# Patient Record
Sex: FEMALE | Race: WHITE | NOT HISPANIC OR LATINO | Employment: PART TIME | URBAN - METROPOLITAN AREA
[De-identification: names, ages, dates, MRNs, and addresses within clinical notes are randomized per-mention and may not be internally consistent; named-entity substitution may affect disease eponyms.]

---

## 2017-08-25 ENCOUNTER — CONVERSION ENCOUNTER (OUTPATIENT)
Dept: GENERAL RADIOLOGY | Facility: HOSPITAL | Age: 67
End: 2017-08-25

## 2018-01-05 ENCOUNTER — OFFICE VISIT CONVERTED (OUTPATIENT)
Dept: CARDIOLOGY | Facility: CLINIC | Age: 68
End: 2018-01-05
Attending: SPECIALIST

## 2018-01-05 ENCOUNTER — OFFICE VISIT CONVERTED (OUTPATIENT)
Dept: DIABETES SERVICES | Facility: HOSPITAL | Age: 68
End: 2018-01-05
Attending: NURSE PRACTITIONER

## 2018-01-05 ENCOUNTER — CONVERSION ENCOUNTER (OUTPATIENT)
Dept: CARDIOLOGY | Facility: CLINIC | Age: 68
End: 2018-01-05

## 2018-01-29 ENCOUNTER — OFFICE VISIT CONVERTED (OUTPATIENT)
Dept: FAMILY MEDICINE CLINIC | Age: 68
End: 2018-01-29
Attending: NURSE PRACTITIONER

## 2018-02-19 ENCOUNTER — OFFICE VISIT CONVERTED (OUTPATIENT)
Dept: DIABETES SERVICES | Facility: HOSPITAL | Age: 68
End: 2018-02-19
Attending: NURSE PRACTITIONER

## 2018-02-27 ENCOUNTER — OFFICE VISIT CONVERTED (OUTPATIENT)
Dept: CARDIOLOGY | Facility: CLINIC | Age: 68
End: 2018-02-27
Attending: SPECIALIST

## 2018-03-13 ENCOUNTER — OFFICE VISIT CONVERTED (OUTPATIENT)
Dept: FAMILY MEDICINE CLINIC | Age: 68
End: 2018-03-13
Attending: NURSE PRACTITIONER

## 2018-05-16 ENCOUNTER — OFFICE VISIT CONVERTED (OUTPATIENT)
Dept: DIABETES SERVICES | Facility: HOSPITAL | Age: 68
End: 2018-05-16
Attending: NURSE PRACTITIONER

## 2018-05-24 ENCOUNTER — OFFICE VISIT CONVERTED (OUTPATIENT)
Dept: FAMILY MEDICINE CLINIC | Age: 68
End: 2018-05-24
Attending: NURSE PRACTITIONER

## 2018-06-25 ENCOUNTER — OFFICE VISIT CONVERTED (OUTPATIENT)
Dept: CARDIOLOGY | Facility: CLINIC | Age: 68
End: 2018-06-25
Attending: SPECIALIST

## 2018-06-25 ENCOUNTER — CONVERSION ENCOUNTER (OUTPATIENT)
Dept: CARDIOLOGY | Facility: CLINIC | Age: 68
End: 2018-06-25

## 2018-08-09 ENCOUNTER — OFFICE VISIT CONVERTED (OUTPATIENT)
Dept: DIABETES SERVICES | Facility: HOSPITAL | Age: 68
End: 2018-08-09
Attending: NURSE PRACTITIONER

## 2018-09-04 ENCOUNTER — OFFICE VISIT CONVERTED (OUTPATIENT)
Dept: DIABETES SERVICES | Facility: HOSPITAL | Age: 68
End: 2018-09-04
Attending: NURSE PRACTITIONER

## 2018-09-19 ENCOUNTER — CONVERSION ENCOUNTER (OUTPATIENT)
Dept: GENERAL RADIOLOGY | Facility: HOSPITAL | Age: 68
End: 2018-09-19

## 2018-09-24 ENCOUNTER — OFFICE VISIT CONVERTED (OUTPATIENT)
Dept: FAMILY MEDICINE CLINIC | Age: 68
End: 2018-09-24
Attending: NURSE PRACTITIONER

## 2018-10-16 ENCOUNTER — OFFICE VISIT CONVERTED (OUTPATIENT)
Dept: DIABETES SERVICES | Facility: HOSPITAL | Age: 68
End: 2018-10-16
Attending: NURSE PRACTITIONER

## 2018-11-16 ENCOUNTER — OFFICE VISIT CONVERTED (OUTPATIENT)
Dept: FAMILY MEDICINE CLINIC | Age: 68
End: 2018-11-16
Attending: NURSE PRACTITIONER

## 2018-12-04 ENCOUNTER — OFFICE VISIT CONVERTED (OUTPATIENT)
Dept: SURGERY | Facility: CLINIC | Age: 68
End: 2018-12-04
Attending: SURGERY

## 2018-12-10 ENCOUNTER — OFFICE VISIT CONVERTED (OUTPATIENT)
Dept: CARDIOLOGY | Facility: CLINIC | Age: 68
End: 2018-12-10
Attending: SPECIALIST

## 2018-12-10 ENCOUNTER — CONVERSION ENCOUNTER (OUTPATIENT)
Dept: CARDIOLOGY | Facility: CLINIC | Age: 68
End: 2018-12-10

## 2019-01-02 LAB
ANION GAP SERPL CALC-SCNC: 16 MMOL/L (ref 8–19)
BUN SERPL-MCNC: 17 MG/DL (ref 5–25)
BUN/CREAT SERPL: 26 {RATIO} (ref 6–20)
CALCIUM SERPL-MCNC: 9.6 MG/DL (ref 8.7–10.4)
CHLORIDE SERPL-SCNC: 100 MMOL/L (ref 99–111)
CONV CO2: 26 MMOL/L (ref 22–32)
CREAT UR-MCNC: 0.65 MG/DL (ref 0.5–0.9)
GFR SERPLBLD BASED ON 1.73 SQ M-ARVRAT: >60 ML/MIN/{1.73_M2}
GLUCOSE SERPL-MCNC: 173 MG/DL (ref 65–99)
OSMOLALITY SERPL CALC.SUM OF ELEC: 292 MOSM/KG (ref 273–304)
POTASSIUM SERPL-SCNC: 4 MMOL/L (ref 3.5–5.3)
SODIUM SERPL-SCNC: 138 MMOL/L (ref 135–147)

## 2019-01-04 ENCOUNTER — HOSPITAL ENCOUNTER (OUTPATIENT)
Dept: PERIOP | Facility: HOSPITAL | Age: 69
Setting detail: HOSPITAL OUTPATIENT SURGERY
Discharge: HOME | End: 2019-01-04
Attending: SURGERY

## 2019-01-04 LAB
GLUCOSE BLD-MCNC: 120 MG/DL (ref 65–99)
GLUCOSE BLD-MCNC: 184 MG/DL (ref 65–99)

## 2019-01-15 ENCOUNTER — OFFICE VISIT CONVERTED (OUTPATIENT)
Dept: UROLOGY | Facility: CLINIC | Age: 69
End: 2019-01-15
Attending: UROLOGY

## 2019-01-15 ENCOUNTER — CONVERSION ENCOUNTER (OUTPATIENT)
Dept: SURGERY | Facility: CLINIC | Age: 69
End: 2019-01-15

## 2019-01-16 ENCOUNTER — OFFICE VISIT CONVERTED (OUTPATIENT)
Dept: DIABETES SERVICES | Facility: HOSPITAL | Age: 69
End: 2019-01-16
Attending: NURSE PRACTITIONER

## 2019-01-16 ENCOUNTER — HOSPITAL ENCOUNTER (OUTPATIENT)
Dept: DIABETES SERVICES | Facility: HOSPITAL | Age: 69
Discharge: HOME | End: 2019-01-16
Attending: NURSE PRACTITIONER

## 2019-01-17 ENCOUNTER — OFFICE VISIT CONVERTED (OUTPATIENT)
Dept: SURGERY | Facility: CLINIC | Age: 69
End: 2019-01-17
Attending: SURGERY

## 2019-01-17 ENCOUNTER — HOSPITAL ENCOUNTER (OUTPATIENT)
Dept: OTHER | Facility: HOSPITAL | Age: 69
Discharge: HOME | End: 2019-01-17
Attending: NURSE PRACTITIONER

## 2019-01-17 LAB
CONV CREATININE URINE, RANDOM: 123.5 MG/DL (ref 10–300)
CONV MICROALBUM.,U,RANDOM: <12 MG/L (ref 0–20)
EST. AVERAGE GLUCOSE BLD GHB EST-MCNC: 163 MG/DL
HBA1C MFR BLD: 7.3 % (ref 3.5–5.7)
MICROALBUMIN/CREAT UR: 9.7 MG/G{CRE} (ref 0–35)

## 2019-03-04 ENCOUNTER — OFFICE VISIT CONVERTED (OUTPATIENT)
Dept: FAMILY MEDICINE CLINIC | Age: 69
End: 2019-03-04
Attending: NURSE PRACTITIONER

## 2019-03-04 ENCOUNTER — HOSPITAL ENCOUNTER (OUTPATIENT)
Dept: OTHER | Facility: HOSPITAL | Age: 69
Discharge: HOME | End: 2019-03-04
Attending: NURSE PRACTITIONER

## 2019-03-06 LAB — BACTERIA UR CULT: NORMAL

## 2019-03-20 ENCOUNTER — OFFICE VISIT CONVERTED (OUTPATIENT)
Dept: FAMILY MEDICINE CLINIC | Age: 69
End: 2019-03-20
Attending: NURSE PRACTITIONER

## 2019-03-20 ENCOUNTER — HOSPITAL ENCOUNTER (OUTPATIENT)
Dept: OTHER | Facility: HOSPITAL | Age: 69
Discharge: HOME | End: 2019-03-20
Attending: NURSE PRACTITIONER

## 2019-03-20 LAB
APPEARANCE UR: CLEAR
BACTERIA UR QL AUTO: ABNORMAL
BILIRUB UR QL: NEGATIVE
CASTS URNS QL MICRO: ABNORMAL /[LPF]
COLOR UR: YELLOW
CONV LEUKOCYTE ESTERASE: NEGATIVE
CONV UROBILINOGEN IN URINE BY AUTOMATED TEST STRIP: 0.2 {EHRLICHU}/DL (ref 0.1–1)
EPI CELLS #/AREA URNS HPF: ABNORMAL /[HPF]
GLUCOSE 24H UR-MCNC: NEGATIVE MG/DL
HGB UR QL STRIP: ABNORMAL
KETONES UR QL STRIP: NEGATIVE MG/DL
MUCOUS THREADS URNS QL MICRO: ABNORMAL
NITRITE UR-MCNC: NEGATIVE MG/ML
PH UR STRIP.AUTO: 6 [PH] (ref 5–8)
PROT UR-MCNC: NEGATIVE MG/DL
RBC # BLD AUTO: ABNORMAL /[HPF]
SP GR UR STRIP: 1.02 (ref 1–1.03)
SPECIMEN SOURCE: ABNORMAL
UNIDENT CRYS URNS QL MICRO: ABNORMAL /[HPF]
WBC #/AREA URNS HPF: ABNORMAL /[HPF]

## 2019-03-22 ENCOUNTER — HOSPITAL ENCOUNTER (OUTPATIENT)
Dept: OTHER | Facility: HOSPITAL | Age: 69
Discharge: HOME | End: 2019-03-22
Attending: NURSE PRACTITIONER

## 2019-03-22 LAB
ALBUMIN SERPL-MCNC: 4.2 G/DL (ref 3.5–5)
ALBUMIN/GLOB SERPL: 1.4 {RATIO} (ref 1.4–2.6)
ALP SERPL-CCNC: 71 U/L (ref 43–160)
ALT SERPL-CCNC: 15 U/L (ref 10–40)
ANION GAP SERPL CALC-SCNC: 14 MMOL/L (ref 8–19)
AST SERPL-CCNC: 20 U/L (ref 15–50)
BACTERIA UR CULT: NORMAL
BILIRUB SERPL-MCNC: 0.46 MG/DL (ref 0.2–1.3)
BUN SERPL-MCNC: 15 MG/DL (ref 5–25)
BUN/CREAT SERPL: 19 {RATIO} (ref 6–20)
CALCIUM SERPL-MCNC: 9.3 MG/DL (ref 8.7–10.4)
CHLORIDE SERPL-SCNC: 102 MMOL/L (ref 99–111)
CHOLEST SERPL-MCNC: 185 MG/DL (ref 107–200)
CHOLEST/HDLC SERPL: 1.9 {RATIO} (ref 3–6)
CONV CO2: 27 MMOL/L (ref 22–32)
CONV TOTAL PROTEIN: 7.2 G/DL (ref 6.3–8.2)
CREAT UR-MCNC: 0.81 MG/DL (ref 0.5–0.9)
EST. AVERAGE GLUCOSE BLD GHB EST-MCNC: 151 MG/DL
GFR SERPLBLD BASED ON 1.73 SQ M-ARVRAT: >60 ML/MIN/{1.73_M2}
GLOBULIN UR ELPH-MCNC: 3 G/DL (ref 2–3.5)
GLUCOSE SERPL-MCNC: 121 MG/DL (ref 65–99)
HBA1C MFR BLD: 6.9 % (ref 3.5–5.7)
HDLC SERPL-MCNC: 98 MG/DL (ref 40–60)
LDLC SERPL CALC-MCNC: 78 MG/DL (ref 70–100)
OSMOLALITY SERPL CALC.SUM OF ELEC: 290 MOSM/KG (ref 273–304)
POTASSIUM SERPL-SCNC: 4.3 MMOL/L (ref 3.5–5.3)
SODIUM SERPL-SCNC: 139 MMOL/L (ref 135–147)
T4 FREE SERPL-MCNC: 1.1 NG/DL (ref 0.9–1.8)
TRIGL SERPL-MCNC: 47 MG/DL (ref 40–150)
TSH SERPL-ACNC: 5.24 M[IU]/L (ref 0.27–4.2)
VLDLC SERPL-MCNC: 9 MG/DL (ref 5–37)

## 2019-04-16 ENCOUNTER — OFFICE VISIT CONVERTED (OUTPATIENT)
Dept: DIABETES SERVICES | Facility: HOSPITAL | Age: 69
End: 2019-04-16
Attending: NURSE PRACTITIONER

## 2019-05-16 ENCOUNTER — HOSPITAL ENCOUNTER (OUTPATIENT)
Dept: OTHER | Facility: HOSPITAL | Age: 69
Discharge: HOME | End: 2019-05-16
Attending: NURSE PRACTITIONER

## 2019-05-16 LAB — TSH SERPL-ACNC: 0.66 M[IU]/L (ref 0.27–4.2)

## 2019-06-10 ENCOUNTER — CONVERSION ENCOUNTER (OUTPATIENT)
Dept: CARDIOLOGY | Facility: CLINIC | Age: 69
End: 2019-06-10

## 2019-06-10 ENCOUNTER — OFFICE VISIT CONVERTED (OUTPATIENT)
Dept: CARDIOLOGY | Facility: CLINIC | Age: 69
End: 2019-06-10
Attending: SPECIALIST

## 2019-06-14 ENCOUNTER — HOSPITAL ENCOUNTER (OUTPATIENT)
Dept: OTHER | Facility: HOSPITAL | Age: 69
Discharge: HOME | End: 2019-06-14
Attending: NURSE PRACTITIONER

## 2019-06-14 ENCOUNTER — OFFICE VISIT CONVERTED (OUTPATIENT)
Dept: FAMILY MEDICINE CLINIC | Age: 69
End: 2019-06-14
Attending: NURSE PRACTITIONER

## 2019-06-14 LAB
ALBUMIN SERPL-MCNC: 3.9 G/DL (ref 3.5–5)
ALBUMIN/GLOB SERPL: 1.6 {RATIO} (ref 1.4–2.6)
ALP SERPL-CCNC: 71 U/L (ref 43–160)
ALT SERPL-CCNC: 14 U/L (ref 10–40)
ANION GAP SERPL CALC-SCNC: 15 MMOL/L (ref 8–19)
AST SERPL-CCNC: 18 U/L (ref 15–50)
BILIRUB SERPL-MCNC: 0.25 MG/DL (ref 0.2–1.3)
BUN SERPL-MCNC: 16 MG/DL (ref 5–25)
BUN/CREAT SERPL: 20 {RATIO} (ref 6–20)
CALCIUM SERPL-MCNC: 8.8 MG/DL (ref 8.7–10.4)
CHLORIDE SERPL-SCNC: 102 MMOL/L (ref 99–111)
CONV CO2: 27 MMOL/L (ref 22–32)
CONV TOTAL PROTEIN: 6.4 G/DL (ref 6.3–8.2)
CREAT UR-MCNC: 0.79 MG/DL (ref 0.5–0.9)
CRP SERPL-MCNC: 3.2 MG/L (ref 0–5)
ERYTHROCYTE [DISTWIDTH] IN BLOOD BY AUTOMATED COUNT: 12.4 % (ref 11.5–14.5)
GFR SERPLBLD BASED ON 1.73 SQ M-ARVRAT: >60 ML/MIN/{1.73_M2}
GLOBULIN UR ELPH-MCNC: 2.5 G/DL (ref 2–3.5)
GLUCOSE SERPL-MCNC: 189 MG/DL (ref 65–99)
HBA1C MFR BLD: 11.2 G/DL (ref 12–16)
HCT VFR BLD AUTO: 32.2 % (ref 37–47)
MCH RBC QN AUTO: 31.6 PG (ref 27–31)
MCHC RBC AUTO-ENTMCNC: 34.8 G/DL (ref 33–37)
MCV RBC AUTO: 91 FL (ref 81–99)
OSMOLALITY SERPL CALC.SUM OF ELEC: 296 MOSM/KG (ref 273–304)
PLATELET # BLD AUTO: 266 10*3/UL (ref 130–400)
PMV BLD AUTO: 10.3 FL (ref 7.4–10.4)
POTASSIUM SERPL-SCNC: 3.7 MMOL/L (ref 3.5–5.3)
RBC # BLD AUTO: 3.54 10*6/UL (ref 4.2–5.4)
SODIUM SERPL-SCNC: 140 MMOL/L (ref 135–147)
WBC # BLD AUTO: 4.1 10*3/UL (ref 4.8–10.8)

## 2019-06-17 LAB — CONV ANTI NUCLEAR ANTIBODY WITH REFLEX: NEGATIVE

## 2019-07-29 ENCOUNTER — OFFICE VISIT CONVERTED (OUTPATIENT)
Dept: DIABETES SERVICES | Facility: HOSPITAL | Age: 69
End: 2019-07-29
Attending: NURSE PRACTITIONER

## 2019-07-30 ENCOUNTER — CONVERSION ENCOUNTER (OUTPATIENT)
Dept: SURGERY | Facility: CLINIC | Age: 69
End: 2019-07-30

## 2019-07-30 ENCOUNTER — OFFICE VISIT CONVERTED (OUTPATIENT)
Dept: UROLOGY | Facility: CLINIC | Age: 69
End: 2019-07-30
Attending: UROLOGY

## 2019-08-16 ENCOUNTER — HOSPITAL ENCOUNTER (OUTPATIENT)
Dept: OTHER | Facility: HOSPITAL | Age: 69
Discharge: HOME | End: 2019-08-16
Attending: NURSE PRACTITIONER

## 2019-08-16 LAB
T4 FREE SERPL-MCNC: 1.4 NG/DL (ref 0.9–1.8)
TSH SERPL-ACNC: 0.11 M[IU]/L (ref 0.27–4.2)

## 2019-09-19 ENCOUNTER — OFFICE VISIT CONVERTED (OUTPATIENT)
Dept: FAMILY MEDICINE CLINIC | Age: 69
End: 2019-09-19
Attending: NURSE PRACTITIONER

## 2019-09-23 ENCOUNTER — HOSPITAL ENCOUNTER (OUTPATIENT)
Dept: GENERAL RADIOLOGY | Facility: HOSPITAL | Age: 69
Discharge: HOME | End: 2019-09-23
Attending: NURSE PRACTITIONER

## 2019-10-23 ENCOUNTER — OFFICE VISIT CONVERTED (OUTPATIENT)
Dept: FAMILY MEDICINE CLINIC | Age: 69
End: 2019-10-23
Attending: NURSE PRACTITIONER

## 2019-10-30 ENCOUNTER — OFFICE VISIT CONVERTED (OUTPATIENT)
Dept: DIABETES SERVICES | Facility: HOSPITAL | Age: 69
End: 2019-10-30
Attending: NURSE PRACTITIONER

## 2019-10-31 ENCOUNTER — HOSPITAL ENCOUNTER (OUTPATIENT)
Dept: OTHER | Facility: HOSPITAL | Age: 69
Discharge: HOME | End: 2019-10-31
Attending: NURSE PRACTITIONER

## 2019-10-31 LAB
ALBUMIN SERPL-MCNC: 4.1 G/DL (ref 3.5–5)
ALBUMIN/GLOB SERPL: 1.5 {RATIO} (ref 1.4–2.6)
ALP SERPL-CCNC: 74 U/L (ref 43–160)
ALT SERPL-CCNC: 13 U/L (ref 10–40)
ANION GAP SERPL CALC-SCNC: 18 MMOL/L (ref 8–19)
AST SERPL-CCNC: 18 U/L (ref 15–50)
BILIRUB SERPL-MCNC: 0.67 MG/DL (ref 0.2–1.3)
BUN SERPL-MCNC: 17 MG/DL (ref 5–25)
BUN/CREAT SERPL: 24 {RATIO} (ref 6–20)
CALCIUM SERPL-MCNC: 9.3 MG/DL (ref 8.7–10.4)
CHLORIDE SERPL-SCNC: 100 MMOL/L (ref 99–111)
CHOLEST SERPL-MCNC: 174 MG/DL (ref 107–200)
CHOLEST/HDLC SERPL: 2 {RATIO} (ref 3–6)
CONV CO2: 24 MMOL/L (ref 22–32)
CONV CREATININE URINE, RANDOM: 111.7 MG/DL (ref 10–300)
CONV MICROALBUM.,U,RANDOM: <12 MG/L (ref 0–20)
CONV TOTAL PROTEIN: 6.9 G/DL (ref 6.3–8.2)
CREAT UR-MCNC: 0.71 MG/DL (ref 0.5–0.9)
EST. AVERAGE GLUCOSE BLD GHB EST-MCNC: 163 MG/DL
GFR SERPLBLD BASED ON 1.73 SQ M-ARVRAT: >60 ML/MIN/{1.73_M2}
GLOBULIN UR ELPH-MCNC: 2.8 G/DL (ref 2–3.5)
GLUCOSE SERPL-MCNC: 258 MG/DL (ref 65–99)
HBA1C MFR BLD: 7.3 % (ref 3.5–5.7)
HDLC SERPL-MCNC: 86 MG/DL (ref 40–60)
LDLC SERPL CALC-MCNC: 79 MG/DL (ref 70–100)
MICROALBUMIN/CREAT UR: 10.7 MG/G{CRE} (ref 0–35)
OSMOLALITY SERPL CALC.SUM OF ELEC: 296 MOSM/KG (ref 273–304)
POTASSIUM SERPL-SCNC: 4.3 MMOL/L (ref 3.5–5.3)
SODIUM SERPL-SCNC: 138 MMOL/L (ref 135–147)
T4 FREE SERPL-MCNC: 1.6 NG/DL (ref 0.9–1.8)
TRIGL SERPL-MCNC: 45 MG/DL (ref 40–150)
TSH SERPL-ACNC: 0.21 M[IU]/L (ref 0.27–4.2)
VLDLC SERPL-MCNC: 9 MG/DL (ref 5–37)

## 2019-12-10 ENCOUNTER — OFFICE VISIT CONVERTED (OUTPATIENT)
Dept: CARDIOLOGY | Facility: CLINIC | Age: 69
End: 2019-12-10
Attending: SPECIALIST

## 2019-12-10 ENCOUNTER — CONVERSION ENCOUNTER (OUTPATIENT)
Dept: CARDIOLOGY | Facility: CLINIC | Age: 69
End: 2019-12-10

## 2019-12-30 ENCOUNTER — HOSPITAL ENCOUNTER (OUTPATIENT)
Dept: OTHER | Facility: HOSPITAL | Age: 69
Discharge: HOME | End: 2019-12-30
Attending: NURSE PRACTITIONER

## 2019-12-30 LAB — TSH SERPL-ACNC: 2.25 M[IU]/L (ref 0.27–4.2)

## 2020-01-21 ENCOUNTER — OFFICE VISIT CONVERTED (OUTPATIENT)
Dept: PODIATRY | Facility: CLINIC | Age: 70
End: 2020-01-21
Attending: PODIATRIST

## 2020-01-27 ENCOUNTER — OFFICE VISIT CONVERTED (OUTPATIENT)
Dept: DIABETES SERVICES | Facility: HOSPITAL | Age: 70
End: 2020-01-27
Attending: NURSE PRACTITIONER

## 2020-04-29 ENCOUNTER — OFFICE VISIT CONVERTED (OUTPATIENT)
Dept: DIABETES SERVICES | Facility: HOSPITAL | Age: 70
End: 2020-04-29
Attending: NURSE PRACTITIONER

## 2020-05-05 ENCOUNTER — HOSPITAL ENCOUNTER (OUTPATIENT)
Dept: OTHER | Facility: HOSPITAL | Age: 70
Discharge: HOME | End: 2020-05-05
Attending: NURSE PRACTITIONER

## 2020-05-05 LAB
CONV CREATININE URINE, RANDOM: 180.4 MG/DL (ref 10–300)
CONV MICROALBUM.,U,RANDOM: <12 MG/L (ref 0–20)
EST. AVERAGE GLUCOSE BLD GHB EST-MCNC: 177 MG/DL
HBA1C MFR BLD: 7.8 % (ref 3.5–5.7)
MICROALBUMIN/CREAT UR: 6.7 MG/G{CRE} (ref 0–35)

## 2020-05-13 ENCOUNTER — OFFICE VISIT CONVERTED (OUTPATIENT)
Dept: FAMILY MEDICINE CLINIC | Age: 70
End: 2020-05-13
Attending: NURSE PRACTITIONER

## 2020-05-14 ENCOUNTER — HOSPITAL ENCOUNTER (OUTPATIENT)
Dept: OTHER | Facility: HOSPITAL | Age: 70
Discharge: HOME | End: 2020-05-14
Attending: NURSE PRACTITIONER

## 2020-05-14 LAB
ALBUMIN SERPL-MCNC: 4.2 G/DL (ref 3.5–5)
ALBUMIN/GLOB SERPL: 1.4 {RATIO} (ref 1.4–2.6)
ALP SERPL-CCNC: 70 U/L (ref 43–160)
ALT SERPL-CCNC: 13 U/L (ref 10–40)
ANION GAP SERPL CALC-SCNC: 13 MMOL/L (ref 8–19)
APPEARANCE UR: CLEAR
AST SERPL-CCNC: 20 U/L (ref 15–50)
BACTERIA UR QL AUTO: ABNORMAL
BILIRUB SERPL-MCNC: 0.55 MG/DL (ref 0.2–1.3)
BILIRUB UR QL: NEGATIVE
BUN SERPL-MCNC: 15 MG/DL (ref 5–25)
BUN/CREAT SERPL: 19 {RATIO} (ref 6–20)
CALCIUM SERPL-MCNC: 9.3 MG/DL (ref 8.7–10.4)
CASTS URNS QL MICRO: ABNORMAL /[LPF]
CHLORIDE SERPL-SCNC: 101 MMOL/L (ref 99–111)
CHOLEST SERPL-MCNC: 240 MG/DL (ref 107–200)
CHOLEST/HDLC SERPL: 2.9 {RATIO} (ref 3–6)
COLOR UR: YELLOW
CONV CO2: 29 MMOL/L (ref 22–32)
CONV LEUKOCYTE ESTERASE: NEGATIVE
CONV TOTAL PROTEIN: 7.3 G/DL (ref 6.3–8.2)
CONV UROBILINOGEN IN URINE BY AUTOMATED TEST STRIP: 0.2 {EHRLICHU}/DL (ref 0.1–1)
CREAT UR-MCNC: 0.81 MG/DL (ref 0.5–0.9)
EPI CELLS #/AREA URNS HPF: ABNORMAL /[HPF]
GFR SERPLBLD BASED ON 1.73 SQ M-ARVRAT: >60 ML/MIN/{1.73_M2}
GLOBULIN UR ELPH-MCNC: 3.1 G/DL (ref 2–3.5)
GLUCOSE 24H UR-MCNC: NEGATIVE MG/DL
GLUCOSE SERPL-MCNC: 154 MG/DL (ref 65–99)
HDLC SERPL-MCNC: 82 MG/DL (ref 40–60)
HGB UR QL STRIP: NEGATIVE
KETONES UR QL STRIP: NEGATIVE MG/DL
LDLC SERPL CALC-MCNC: 145 MG/DL (ref 70–100)
MUCOUS THREADS URNS QL MICRO: ABNORMAL
NITRITE UR-MCNC: NEGATIVE MG/ML
OSMOLALITY SERPL CALC.SUM OF ELEC: 292 MOSM/KG (ref 273–304)
PH UR STRIP.AUTO: 7.5 [PH] (ref 5–8)
POTASSIUM SERPL-SCNC: 4.4 MMOL/L (ref 3.5–5.3)
PROT UR-MCNC: NEGATIVE MG/DL
RBC # BLD AUTO: ABNORMAL /[HPF]
SODIUM SERPL-SCNC: 139 MMOL/L (ref 135–147)
SP GR UR STRIP: 1.02 (ref 1–1.03)
SPECIMEN SOURCE: ABNORMAL
T4 FREE SERPL-MCNC: 1.3 NG/DL (ref 0.9–1.8)
TRIGL SERPL-MCNC: 64 MG/DL (ref 40–150)
TSH SERPL-ACNC: 4.57 M[IU]/L (ref 0.27–4.2)
UNIDENT CRYS URNS QL MICRO: ABNORMAL /[HPF]
VLDLC SERPL-MCNC: 13 MG/DL (ref 5–37)
WBC #/AREA URNS HPF: ABNORMAL /[HPF]

## 2020-05-16 LAB — BACTERIA UR CULT: NORMAL

## 2020-05-21 ENCOUNTER — OFFICE VISIT CONVERTED (OUTPATIENT)
Dept: FAMILY MEDICINE CLINIC | Age: 70
End: 2020-05-21
Attending: NURSE PRACTITIONER

## 2020-07-06 ENCOUNTER — HOSPITAL ENCOUNTER (OUTPATIENT)
Dept: OTHER | Facility: HOSPITAL | Age: 70
Discharge: HOME | End: 2020-07-06
Attending: NURSE PRACTITIONER

## 2020-07-06 LAB
ALBUMIN SERPL-MCNC: 4.3 G/DL (ref 3.5–5)
ALBUMIN/GLOB SERPL: 1.5 {RATIO} (ref 1.4–2.6)
ALP SERPL-CCNC: 69 U/L (ref 43–160)
ALT SERPL-CCNC: 14 U/L (ref 10–40)
ANION GAP SERPL CALC-SCNC: 17 MMOL/L (ref 8–19)
AST SERPL-CCNC: 19 U/L (ref 15–50)
BILIRUB SERPL-MCNC: 0.44 MG/DL (ref 0.2–1.3)
BUN SERPL-MCNC: 18 MG/DL (ref 5–25)
BUN/CREAT SERPL: 20 {RATIO} (ref 6–20)
CALCIUM SERPL-MCNC: 9.7 MG/DL (ref 8.7–10.4)
CHLORIDE SERPL-SCNC: 101 MMOL/L (ref 99–111)
CHOLEST SERPL-MCNC: 179 MG/DL (ref 107–200)
CHOLEST/HDLC SERPL: 2.2 {RATIO} (ref 3–6)
CONV CO2: 25 MMOL/L (ref 22–32)
CONV TOTAL PROTEIN: 7.2 G/DL (ref 6.3–8.2)
CREAT UR-MCNC: 0.89 MG/DL (ref 0.5–0.9)
GFR SERPLBLD BASED ON 1.73 SQ M-ARVRAT: >60 ML/MIN/{1.73_M2}
GLOBULIN UR ELPH-MCNC: 2.9 G/DL (ref 2–3.5)
GLUCOSE SERPL-MCNC: 144 MG/DL (ref 65–99)
HDLC SERPL-MCNC: 82 MG/DL (ref 40–60)
LDLC SERPL CALC-MCNC: 85 MG/DL (ref 70–100)
OSMOLALITY SERPL CALC.SUM OF ELEC: 292 MOSM/KG (ref 273–304)
POTASSIUM SERPL-SCNC: 4.2 MMOL/L (ref 3.5–5.3)
SODIUM SERPL-SCNC: 139 MMOL/L (ref 135–147)
TRIGL SERPL-MCNC: 60 MG/DL (ref 40–150)
TSH SERPL-ACNC: 2.19 M[IU]/L (ref 0.27–4.2)
VLDLC SERPL-MCNC: 12 MG/DL (ref 5–37)

## 2020-07-31 ENCOUNTER — HOSPITAL ENCOUNTER (OUTPATIENT)
Dept: DIABETES SERVICES | Facility: HOSPITAL | Age: 70
Discharge: HOME | End: 2020-07-31
Attending: NURSE PRACTITIONER

## 2020-07-31 ENCOUNTER — OFFICE VISIT CONVERTED (OUTPATIENT)
Dept: DIABETES SERVICES | Facility: HOSPITAL | Age: 70
End: 2020-07-31
Attending: NURSE PRACTITIONER

## 2020-09-03 ENCOUNTER — TELEPHONE CONVERTED (OUTPATIENT)
Dept: UROLOGY | Facility: CLINIC | Age: 70
End: 2020-09-03
Attending: UROLOGY

## 2020-10-20 ENCOUNTER — OFFICE VISIT CONVERTED (OUTPATIENT)
Dept: FAMILY MEDICINE CLINIC | Age: 70
End: 2020-10-20

## 2020-10-20 ENCOUNTER — HOSPITAL ENCOUNTER (OUTPATIENT)
Dept: OTHER | Facility: HOSPITAL | Age: 70
Discharge: HOME | End: 2020-10-20
Attending: NURSE PRACTITIONER

## 2020-10-23 LAB — SARS-COV-2 RNA SPEC QL NAA+PROBE: DETECTED

## 2020-11-11 ENCOUNTER — OFFICE VISIT CONVERTED (OUTPATIENT)
Dept: DIABETES SERVICES | Facility: HOSPITAL | Age: 70
End: 2020-11-11
Attending: NURSE PRACTITIONER

## 2020-11-19 ENCOUNTER — HOSPITAL ENCOUNTER (OUTPATIENT)
Dept: OTHER | Facility: HOSPITAL | Age: 70
Discharge: HOME | End: 2020-11-19
Attending: NURSE PRACTITIONER

## 2020-11-19 LAB
EST. AVERAGE GLUCOSE BLD GHB EST-MCNC: 189 MG/DL
HBA1C MFR BLD: 8.2 % (ref 3.5–5.7)

## 2020-11-24 ENCOUNTER — OFFICE VISIT CONVERTED (OUTPATIENT)
Dept: CARDIOLOGY | Facility: CLINIC | Age: 70
End: 2020-11-24
Attending: SPECIALIST

## 2020-12-14 ENCOUNTER — OFFICE VISIT CONVERTED (OUTPATIENT)
Dept: FAMILY MEDICINE CLINIC | Age: 70
End: 2020-12-14
Attending: NURSE PRACTITIONER

## 2020-12-22 ENCOUNTER — HOSPITAL ENCOUNTER (OUTPATIENT)
Dept: OTHER | Facility: HOSPITAL | Age: 70
Discharge: HOME | End: 2020-12-22
Attending: NURSE PRACTITIONER

## 2020-12-22 LAB
ALBUMIN SERPL-MCNC: 4 G/DL (ref 3.5–5)
ALBUMIN/GLOB SERPL: 1.4 {RATIO} (ref 1.4–2.6)
ALP SERPL-CCNC: 74 U/L (ref 43–160)
ALT SERPL-CCNC: 16 U/L (ref 10–40)
ANION GAP SERPL CALC-SCNC: 14 MMOL/L (ref 8–19)
AST SERPL-CCNC: 21 U/L (ref 15–50)
BILIRUB SERPL-MCNC: 0.51 MG/DL (ref 0.2–1.3)
BUN SERPL-MCNC: 14 MG/DL (ref 5–25)
BUN/CREAT SERPL: 18 {RATIO} (ref 6–20)
CALCIUM SERPL-MCNC: 9.1 MG/DL (ref 8.7–10.4)
CHLORIDE SERPL-SCNC: 102 MMOL/L (ref 99–111)
CHOLEST SERPL-MCNC: 180 MG/DL (ref 107–200)
CHOLEST/HDLC SERPL: 2 {RATIO} (ref 3–6)
CONV CO2: 27 MMOL/L (ref 22–32)
CONV TOTAL PROTEIN: 6.8 G/DL (ref 6.3–8.2)
CREAT UR-MCNC: 0.77 MG/DL (ref 0.5–0.9)
GFR SERPLBLD BASED ON 1.73 SQ M-ARVRAT: >60 ML/MIN/{1.73_M2}
GLOBULIN UR ELPH-MCNC: 2.8 G/DL (ref 2–3.5)
GLUCOSE SERPL-MCNC: 168 MG/DL (ref 65–99)
HDLC SERPL-MCNC: 90 MG/DL (ref 40–60)
LDLC SERPL CALC-MCNC: 80 MG/DL (ref 70–100)
OSMOLALITY SERPL CALC.SUM OF ELEC: 292 MOSM/KG (ref 273–304)
POTASSIUM SERPL-SCNC: 4.2 MMOL/L (ref 3.5–5.3)
SODIUM SERPL-SCNC: 139 MMOL/L (ref 135–147)
TRIGL SERPL-MCNC: 49 MG/DL (ref 40–150)
TSH SERPL-ACNC: 2.67 M[IU]/L (ref 0.27–4.2)
VLDLC SERPL-MCNC: 10 MG/DL (ref 5–37)

## 2021-01-26 ENCOUNTER — CONVERSION ENCOUNTER (OUTPATIENT)
Dept: PODIATRY | Facility: CLINIC | Age: 71
End: 2021-01-26

## 2021-01-26 ENCOUNTER — OFFICE VISIT CONVERTED (OUTPATIENT)
Dept: PODIATRY | Facility: CLINIC | Age: 71
End: 2021-01-26
Attending: PODIATRIST

## 2021-02-10 ENCOUNTER — HOSPITAL ENCOUNTER (OUTPATIENT)
Dept: OTHER | Facility: HOSPITAL | Age: 71
Discharge: HOME | End: 2021-02-10
Attending: NURSE PRACTITIONER

## 2021-02-26 ENCOUNTER — HOSPITAL ENCOUNTER (OUTPATIENT)
Dept: DIABETES SERVICES | Facility: HOSPITAL | Age: 71
Discharge: HOME | End: 2021-02-26
Attending: NURSE PRACTITIONER

## 2021-02-26 ENCOUNTER — OFFICE VISIT CONVERTED (OUTPATIENT)
Dept: DIABETES SERVICES | Facility: HOSPITAL | Age: 71
End: 2021-02-26
Attending: NURSE PRACTITIONER

## 2021-02-26 ENCOUNTER — HOSPITAL ENCOUNTER (OUTPATIENT)
Dept: OTHER | Facility: HOSPITAL | Age: 71
Discharge: HOME | End: 2021-02-26
Attending: NURSE PRACTITIONER

## 2021-02-26 LAB
EST. AVERAGE GLUCOSE BLD GHB EST-MCNC: 166 MG/DL
HBA1C MFR BLD: 7.4 % (ref 3.5–5.7)

## 2021-05-13 NOTE — PROGRESS NOTES
Progress Note      Patient Name: Yohana Casillas   Patient ID: [id number]   Sex: Female   YOB: 1950    Primary Care Provider: Shanthi MEJIA    Visit Date: September 3, 2020    Provider: Leticia Hawkins MD   [facility]: [facility]   Location Address: [address] [place] [address]  [id number]   Location Phone: [phone]          Chief Complaint  · Pt is here for urological concerns     Telephone Visit- presents for evaluation via telephone.   Verbal consent obtained before beginning visit.   The following staff were present during this visit: Radha Otero LPN  Total time for encounter: 11 minutes       History Of Present Illness     The patient is seen in follow-up for problems with cystitis. She is also a diabetic and has been having complaints of frequency, urgency, and dysuria. She was having problems with chronic infections until she started on Premarin vaginal cream and now has been doing well. She did have an episode where she was burning and going to the bathroom frequently. She started on antibiotic therapy and then it stopped. She is not having any symptoms today.    Update 1/15/19: Patient presents for annual follow-up of urinary symptoms and atrophic vaginitis.  Patient states that she has been doing well for quite some time but is recently status post cholecystectomy.  She reports 2 UTIs prior to cholecystectomy and once since.  Treated with empiric antibiotics.  She is unsure if cultures were obtained; not available for review.  Patient in addition to antibiotics took pyridium with relief of UTI symptoms.  Patient was also out of Premarin cream prior to UTIs.  Otherwise patient denies new complaints or changes to history since last visit.    Update 7/30/2019: Presents for routine follow-up.  Patient notes only one UTI since last visit which was treated without issue via antibiotics.  Has improved urinary urgency.  Denies urinary complaints today.  Patient  continues to apply vaginal estrogen cream twice weekly.  Requests refills.    Update 9/3/20:  Presents for annual follow up; 2 uti over the last year. Urinary urgency is currently tolerable. Takes AZO on occasion; bladder irritated with tomatoes.  Continues to use estrogen cream weekly.       Past Medical History  Atrophic Vaginitis; Diabetes; Foot pain, right; High blood pressure; Hyperlipemia, mixed; Hyperthyroidism         Past Surgical History  carpal tunnel; Cataract surgery; Colonoscopy; EGD; Gallbladder (Laparoscopic); Hysterectomy; nose; Thyroid surgery; Tubal ligation         Medication List  amlodipine 10 mg oral tablet; amlodipine 5 mg oral tablet; Aspirin Low Dose 81 mg oral tablet,delayed release (DR/EC); hydrochlorothiazide 25 mg oral tablet; levothyroxine 75 mcg oral tablet; Lexapro 10 mg oral tablet; lisinopril 40 mg oral tablet; Novolog U-100 Insulin aspart 100 unit/mL subcutaneous solution; Premarin 0.625 mg/gram vaginal cream; Protonix 40 mg oral tablet,delayed release (DR/EC); simvastatin 20 mg oral tablet         Allergy List  SULFA (SULFONAMIDES)       Allergies Reconciled  Family Medical History  Heart Disease; - No Family History of Colorectal Cancer; Lung cancer; Diabetes         Reproductive History   0 Para 0 0 0 0       Social History  Alcohol (Never); Tobacco (Never)         Review of Systems  · Constitutional  o Denies  o : chills, fever  · Gastrointestinal  o Denies  o : nausea, vomiting, diarrhea              Assessment  · Atrophic Vaginitis     627.3/N95.2  · Urgency of Urine     788.63  · Urinary Frequency     788.41/R35.0  · Urinary Tract Infection     599.0/N39.0    Problems Reconciled  Plan  · Orders  o Physician Telephone Evaluation, 11-20 minutes (31042) - 627.3/N95.2, 788.41/R35.0, 788.63, 599.0/N39.0 - 2020  · Medications  o Medications have been Reconciled  o Transition of Care or Provider Policy  · Instructions  o Electronically Identified Patient Education  Materials Provided Electronically     Atrophic vaginitis- premarin refilled    Urinary tract infection- Encouraged again behavioral modifications including fluid management, hydration, not delaying urgency when she needs to void.     Discussed with patient the importance of obtaining urine culture prior to treatment with antibiotics for urinary tract infection. - she will call if symptoms so that culture may be obtained prior to starting treatment    Follow-up in 1 year or earlier as needed.  All questions addressed.               Electronically Signed by: Leticia Hawkins MD -Author on September 3, 2020 03:23:59 PM

## 2021-05-13 NOTE — PROGRESS NOTES
"   Progress Note      Patient Name: Yohana Casillas   Patient ID: [id number]   Sex: Female   YOB: 1950    Primary Care Provider: Shanthi MEJIA    Visit Date: November 24, 2020    Provider: Dameon Leigh MD   Location: [facility]   Location Address: [address] [place] [address], [facility]   NAIMA Beaver  [id number]   Location Phone: [phone]          Chief Complaint     Hypertension.       History Of Present Illness  Yohana Casillas is a 70 year old /White female with hypertension and palpitations. No further palpitations. No syncopal or presyncopal episodes.   CURRENT MEDICATIONS: Novolog insulin pump; levothyroxine 75 mcg daily; amlodipine daily; lisinopril 40 mg daily; simvastatin 20 mg daily; hydrochlorothiazide daily; aspirin 81 mg daily; Lexapro 25 mg daily.   PAST MEDICAL HISTORY: Diabetes mellitus; Hyperlipidemia; Hypertension; Hypothyroidism.   PSYCHOSOCIAL HISTORY: Denies alcohol use.      ALLERGIES:  Levaquin; Septra; SULFA (SULFONAMIDES).       Review of Systems  · Cardiovascular  o Admits  o : swelling (feet, ankles, hands)  o Denies  o : palpitations (fast, fluttering, or skipping beats), shortness of breath while walking or lying flat, chest pain or angina pectoris   · Respiratory  o Denies  o : chronic or frequent cough      Vitals  Date Time BP Position Site L\R Cuff Size HR RR TEMP (F) WT  HT  BMI kg/m2 BSA m2 O2 Sat FR L/min FiO2 HC       11/24/2020 12:14 /68 Sitting    56 - R   135lbs 0oz 5'  2\" 24.69 1.64             Physical Examination  · Constitutional  o Appearance  o : Awake, alert, cooperative, pleasant.  · Respiratory  o Inspection of Chest  o : No chest wall deformities, moving equal.  o Auscultation of Lungs  o : Good air entry with vesicular breath sounds.  · Cardiovascular  o Heart  o :   § Auscultation of Heart  § : S1 and S2 regular. No S3. No S4.   o Peripheral Vascular System  o :   § Extremities  § : Peripheral pulses were well felt. No " edema. No cyanosis.  · Gastrointestinal  o Abdominal Examination  o : No masses or organomegaly noted.          Assessment     ASSESSMENT & PLAN:    1.  Essential hypertension, controlled.  Continue amlodipine and hydralazine.  2.  Hyperlipidemia.  Continue current dose of simvastatin, managed by her PMD.  3.  See me back in 6 months.             Electronically Signed by: Ame Otero-, Other -Author on December 1, 2020 09:29:09 AM  Electronically Co-signed by: Dameon Leigh MD -Reviewer on December 16, 2020 10:51:32 AM

## 2021-05-14 VITALS
HEART RATE: 65 BPM | WEIGHT: 135.25 LBS | DIASTOLIC BLOOD PRESSURE: 68 MMHG | HEIGHT: 62 IN | BODY MASS INDEX: 24.89 KG/M2 | OXYGEN SATURATION: 99 % | TEMPERATURE: 97.1 F | SYSTOLIC BLOOD PRESSURE: 124 MMHG

## 2021-05-14 VITALS
WEIGHT: 135 LBS | BODY MASS INDEX: 24.84 KG/M2 | SYSTOLIC BLOOD PRESSURE: 136 MMHG | DIASTOLIC BLOOD PRESSURE: 68 MMHG | HEIGHT: 62 IN | HEART RATE: 56 BPM

## 2021-05-14 NOTE — PROGRESS NOTES
Progress Note      Patient Name: Yohana Casillas   Patient ID: [id number]   Sex: Female   YOB: 1950    Primary Care Provider: Shanthi MEJIA   Referring Provider: Tanner Green DPM    Visit Date: January 26, 2021    Provider: Tanner Green DPM   [facility]   Location Address: [address] [place] [address]  [id number]   Location Phone: [phone]          Chief Complaint  · Diabetic Foot Evaluation      History Of Present Illness  Yohana Casillas presents to the office today as a new patient for a diabetic foot evaluation.   Patient reports that she is a diabetic currently controlling diabetes with insulin      New, Established, New Problem:  est  Location:  bilateral feet  Duration:  10 years  Onset:  gradual  Nature:  IDDM  Stable, worsening, improving:  stable  Aggravating factors:  Previous Treatment:  insulin  Pt states their most recent blood glucose reading was 110.      Patient denies any fevers, chills, nausea, vomiting, shortness of breathe, nor any other constitutional signs nor symptoms.      Patient reports the following medical changes since their last visit:    - gallbladder surgery  -  COVID-19       Past Medical History  Atrophic Vaginitis; Diabetes; Foot pain, right; High blood pressure; Hyperlipemia, mixed; Hyperthyroidism         Past Surgical History  carpal tunnel; Cataract surgery; Colonoscopy; EGD; Gallbladder (Laparoscopic); Hysterectomy; nose; Thyroid surgery; Tubal ligation         Medication List  amlodipine 10 mg oral tablet; amlodipine 5 mg oral tablet; Aspirin Low Dose 81 mg oral tablet,delayed release (DR/EC); hydrochlorothiazide 25 mg oral tablet; levothyroxine 75 mcg oral tablet; Lexapro 10 mg oral tablet; lisinopril 40 mg oral tablet; Novolog U-100 Insulin aspart 100 unit/mL subcutaneous solution; Premarin 0.625 mg/gram vaginal cream; Protonix 40 mg oral tablet,delayed release (DR/EC); simvastatin 20 mg oral tablet  "        Allergy List  SULFA (SULFONAMIDES)       Allergies Reconciled  Family Medical History  Heart Disease; - No Family History of Colorectal Cancer; Lung cancer; Diabetes         Reproductive History   0 Para 0 0 0 0       Social History  Alcohol (Never); Tobacco (Never)         Review of Systems  · Constitutional  o Denies  o : fatigue, night sweats  · Eyes  o Denies  o : double vision, blurred vision  · HENT  o Denies  o : vertigo, recent head injury  · Cardiovascular  o Denies  o : chest pain, irregular heart beats  · Respiratory  o Denies  o : shortness of breath, productive cough  · Gastrointestinal  o Denies  o : nausea, vomiting  · Genitourinary  o Denies  o : dysuria, urinary retention  · Integument  o Denies  o : hair growth change, new skin lesions  · Neurologic  o Denies  o : altered mental status, seizures  · Musculoskeletal  o * See HPI  · Endocrine  o Denies  o : cold intolerance, heat intolerance  · Heme-Lymph  o Denies  o : petechiae, lymph node enlargement or tenderness  · Allergic-Immunologic  o Denies  o : frequent illnesses      Vitals  Date Time BP Position Site L\R Cuff Size HR RR TEMP (F) WT  HT  BMI kg/m2 BSA m2 O2 Sat FR L/min FiO2 HC       2021 10:00 /68 Sitting    65 - R  97.1 135lbs 4oz 5'  2\" 24.74 1.64 99 %            Physical Examination  · Constitutional  o Appearance  o : awake, alert, well-developed, and well nourished   · Cardiovascular  o Peripheral Vascular System  o :   § Extremities  § : no edema noted  · Musculoskeletal  o Extremeties/Joint  o : Lower extremity muscle strength and range of motion is equal and symmetrical bilaterally. The knees are noted to be in normal alignment. Right medial deviation of the first metatarsal with associated lateral deviation of the hallux at the metatarsal phalangeal joint.   · Left DM Foot Exam  o Sensation  o : Sharp/dull sensation is within normal limits. [place] 5.07 monofilament intact to all assessed " areas.   o Visual Inspection  o : Skin is noted to have normal texture and turgor, with no excrescences noted. The toenails are noted to be without disease  o Vascular  o : palpable dorsalis pedis and posterir tibialis pulses present, normal capillary refill  · Right DM Foot Exam  o Sensation  o : Sharp/dull sensation is within normal limits. [place] 5.07 monofilament intact to all assessed areas.   o Visual Inspection  o : Skin is noted to have normal texture and turgor, with no excrescences noted. The toenails are noted to be without disease  o Vascular  o : palpable dorsalis pedis and posterir tibialis pulses present, normal capillary refill                Assessment  · IDDM (insulin dependent diabetes mellitus)       Type 2 diabetes mellitus without complications     250.00/E11.9  Long term (current) use of insulin     250.00/Z79.4      Plan  · Orders  o Diabetic Foot (Motor and Sensory) Exam Completed [facility] (, , 2028F) - - 01/26/2021  · Medications  o Medications have been Reconciled  o Transition of Care or Provider Policy  · Instructions  o Patient is to return in one year for their Podiatric Diabetic Evaluation.   o I have discussed the findings of this evaluation with the patient. The discussion included a complete verbal explanation of any changes in the examination results, diagnosis, and the current treatment plan. A schedule for future care needs was explained. If any questions should arise after returning home, I have encouraged the patient to feel free to contact Dr. Green. The patient states understanding and agreement with this plan.   o Pt to monitor for problems and to contact Dr. Green for follow-up should such signs occur. Patient states understanding and agreement with this plan.   o Diabetic foot exam performed and documented this date, compliant with M required standards. Detail of findings as noted in physical exam.Lower extremity Neuro exam for diabetic patient  performed and documented this date, compliant with PQRS required standards. Detail of findings as noted in physical exam.Advised patient importance of good routine lower extremity hygiene. Advised patient importance of evaluating for intact skin and pain free nail borders. Advised patient to use mirror to evaluate plantar/ soles of feet for better visualization. Advised patient monitor and phone office to be seen if any cracking to skin, open lesions, painful nail borders or if nails become elongated prior to next visit. Advised patient importance of daily cleansing of lower extremities, followed by good skin cream to maintain normal hydration of skin. Also advised patient importance of close daily monitoring of blood sugar. Advised to regulate diet and medications to maintain control of blood sugar in optimal range. Contact primary care provider if difficulties maintaining blood sugar levels.Advised Patient of presence of Diabetes Mellitus condition. Advised Patient risk of progression and worsening or improvement, then return of condition. Will monitor condition for any change in future. Treat with most appropriate treatment pending status of condition.Counseled and advised patient extensively on nature and ramifications of diabetes. Standard instructions given to patient for good diabetic foot care and maintenance. Advised importance of careful monitoring to avoid break down and complications secondary to diabetes. Advised patient importance of strict maintenance of blood sugar control. Advised patient of possible ominous results from neglect of condition,i.e.: amputation/ loss of digits, feet and legs, or even death.Patient states understands counseling, will monitor closely, continue good hygiene and routine diabetic foot care. Patient will contact office is questions or problems.   o Electronically Identified Patient Education Materials Provided Electronically  · Disposition  o Call or Return if symptoms  worsen or persist.            Electronically Signed by: Tanner Green DPM -Author on January 26, 2021 10:19:07 AM

## 2021-05-15 VITALS
OXYGEN SATURATION: 98 % | HEART RATE: 58 BPM | SYSTOLIC BLOOD PRESSURE: 121 MMHG | BODY MASS INDEX: 25.4 KG/M2 | WEIGHT: 138 LBS | DIASTOLIC BLOOD PRESSURE: 63 MMHG | HEIGHT: 62 IN

## 2021-05-15 VITALS
HEIGHT: 62 IN | BODY MASS INDEX: 24.29 KG/M2 | SYSTOLIC BLOOD PRESSURE: 124 MMHG | HEART RATE: 54 BPM | WEIGHT: 132 LBS | DIASTOLIC BLOOD PRESSURE: 54 MMHG

## 2021-05-15 VITALS — HEIGHT: 62 IN | BODY MASS INDEX: 24.29 KG/M2 | WEIGHT: 132 LBS

## 2021-05-15 VITALS — HEIGHT: 62 IN | RESPIRATION RATE: 14 BRPM | BODY MASS INDEX: 24.11 KG/M2 | WEIGHT: 131 LBS

## 2021-05-16 VITALS
HEART RATE: 58 BPM | SYSTOLIC BLOOD PRESSURE: 158 MMHG | BODY MASS INDEX: 24.48 KG/M2 | DIASTOLIC BLOOD PRESSURE: 68 MMHG | HEIGHT: 62 IN | WEIGHT: 133 LBS

## 2021-05-16 VITALS
BODY MASS INDEX: 24.11 KG/M2 | SYSTOLIC BLOOD PRESSURE: 134 MMHG | HEART RATE: 64 BPM | WEIGHT: 131 LBS | DIASTOLIC BLOOD PRESSURE: 70 MMHG | HEIGHT: 62 IN

## 2021-05-16 VITALS
HEART RATE: 56 BPM | DIASTOLIC BLOOD PRESSURE: 76 MMHG | BODY MASS INDEX: 24.48 KG/M2 | SYSTOLIC BLOOD PRESSURE: 150 MMHG | WEIGHT: 133 LBS | HEIGHT: 62 IN

## 2021-05-16 VITALS
DIASTOLIC BLOOD PRESSURE: 68 MMHG | WEIGHT: 134 LBS | SYSTOLIC BLOOD PRESSURE: 144 MMHG | BODY MASS INDEX: 24.66 KG/M2 | HEIGHT: 62 IN | HEART RATE: 54 BPM

## 2021-05-16 VITALS — HEIGHT: 62 IN | BODY MASS INDEX: 24.11 KG/M2 | WEIGHT: 131 LBS | RESPIRATION RATE: 16 BRPM

## 2021-05-16 VITALS — RESPIRATION RATE: 14 BRPM | HEIGHT: 62 IN | WEIGHT: 131 LBS | BODY MASS INDEX: 24.11 KG/M2

## 2021-05-16 VITALS — BODY MASS INDEX: 24.29 KG/M2 | WEIGHT: 132 LBS | HEIGHT: 62 IN | RESPIRATION RATE: 14 BRPM

## 2021-05-18 NOTE — PROGRESS NOTES
Yohana Casillas 1950     Office/Outpatient Visit    Visit Date: Thu, Sep 19, 2019 09:11 am    Provider: Nadia Whittington N.P. (Assistant: Jade Farrell MA)    Location: [facility]        Electronically signed by Nadia Whittington N.P. on  09/19/2019 12:22:39 PM                             SUBJECTIVE:        CC:     Ms. Casillas is a 68 year old White female.  presents today due to complaints of urinary frequency, dysuria X 2 days, also wants to discuss sinus issues (taking Synthroid 0.088)         HPI:         Ms. Casillas presents with dysuria.  This began within the last 3 days.  Associated symptoms include hesitancy and urinary frequency.  She denies associated fever, flank pain or urinary incontinence.  Ms. Casillas states that she had one prior episode of similar discomfort, which was diagnosed as a simple UTI.  Medical history is pertinent for diabetes.          Concerning seasonal allergies, Ms. Casillas complains of allergy symptoms, which started 1 to 2 weeks ago.  The allergy pattern seems to be seasonal.  Her symptom complex includes sneezing, runny nose, post-nasal drip and nasal congestion.  Known allergy triggers include pollens.  Medications previously used include loratadine-  wants to make sure ok to restart loratadine.      ROS:     CONSTITUTIONAL:  Positive for fatigue.   Negative for fever.      E/N/T:  Positive for nasal congestion and frequent rhinorrhea.      CARDIOVASCULAR:  Negative for chest pain, palpitations, tachycardia, orthopnea, and edema.      RESPIRATORY:  Negative for cough, dyspnea, and hemoptysis.      GASTROINTESTINAL:  Negative for abdominal pain, heartburn, constipation, diarrhea, and stool changes.      GENITOURINARY:  Positive for dysuria and frequent urination.   Negative for hematuria, urinary incontinence, vaginal discharge or vaginal itching.      MUSCULOSKELETAL:  Negative for arthralgias, back pain, and myalgias.      NEUROLOGICAL:  Negative for dizziness,  headaches, paresthesias, and weakness.      ALLERGIC/IMMUNOLOGIC:  Positive for seasonal allergies.          PMH/FMH/SH:     Last Reviewed on 3/20/2019 03:26 PM by Shanthi Caceres    Past Medical History:                 PAST MEDICAL HISTORY         Hyperlipidemia: Hypercholesterolemia;     Hypertension     Type 1 Diabetes: controlled;     retinopathy, now being monitored, per Dr. Turner Hospitalizations: uti and drug reaction , at [facility]         CURRENT MEDICAL PROVIDERS:    Cardiologist: Reese    Urologist: dr sara Key NP/CDE         PREVENTIVE HEALTH MAINTENANCE             BONE DENSITY: was last done  osteopenia     COLORECTAL CANCER SCREENING: colonoscopy with normal results     Hepatitis C Medicare Screening: was last done      MAMMOGRAM: was last done 2016 with the following abnormalaties noted-- required more images on left breast     PAP SMEAR: hysterectomy         Surgical History:         Hysterectomy: Partial;      thyroidplasty 11      Thyroidectomy: small portion remains;      Bilateral Tubal Ligation    Bilateral Carpal Tunnel;    right index finger, trigger release and right wrist cyst removed 11; Procedures: colonoscopy      Cataract Removal: bilateral; 9&10- 2016;     Cholecystectomy: laparoscopic; 19;     Procedures:    Colonoscopy ( 14 )    EGD ( 14 )         Family History:         Positive for Hypertension ( brother ).      Positive for Lung Cancer ( father; mother ).      Positive for Seizure Disorder ( brother ).  Father:  at age 63; Cause of death was lung cancer     Mother:  at age 69; Cause of death was adrenal cancer;  Lung Cancer         Social History:     Occupation: [employer] 3-4 days a week     Marital Status:      Children: 2 children         Tobacco/Alcohol/Supplements:     Last Reviewed on 2019 01:33 PM by Jade Farrell    Tobacco: She has never smoked.  Non-drinker          Substance Abuse History:     Last Reviewed on 9/01/2016 02:59 PM by Shantell Garrison    NEGATIVE         Mental Health History:     Last Reviewed on 9/01/2016 02:59 PM by Shantell Garrison        Communicable Diseases (eg STDs):     Last Reviewed on 9/01/2016 02:59 PM by Shantell Garrison            Current Problems:     Last Reviewed on 9/01/2016 02:59 PM by Shantell Garrison    GERD     Anxiety     Fatigue     Postmenopausal atrophic vaginitis     Atrophic vaginitis-Recurrent UTI's     Hip pain     Leukocytopenia, unspecified     Acquired hypothyroidism     IDDM     Essential hypertension, benign     Mixed hyperlipidemia     Unspecified PVD     Urinary tract infection     UTI     Dysuria         Immunizations:     Fluzone (3 + years dose) 10/17/2011     Fluzone Quadrivalent (3+ years) 11/10/2016     Influenza A (H1N1) pf, IM (3+ years) Monovalent 11/18/2009     Fluzone High-Dose pf (>=65 yr) 11/14/2017     Pneumococcal, 23-valent IM/SC (adult and pt >=2yr) 5/26/2009     Adacel (Tdap) 7/11/2012     Zostavax (Zoster live) 9/4/2013         Allergies:     Last Reviewed on 6/14/2019 01:32 PM by Jade Farrell    Aspirin: stomach pain (Adverse Reaction)    Sulfas:    Levaquin:        Current Medications:     Last Reviewed on 9/19/2019 09:13 AM by Jade Farrell    Synthroid 0.088mg Tablet Take 1 tablet(s) by mouth daily     Simvastatin 20mg Tablet Take 1 tablet(s) by mouth daily     Fluticasone Propionate 50mcg/1actuation Nasal Spray 1 or 2 sprays in each nostril qday     Aspirin (ASA) 81mg Chewable Tablet Chew 1 tablet(s) by mouth qam     Lisinopril 40mg Tablet Take 1 tablet(s) by mouth daily     Synthroid 0.075mg Tablet one tablet daily     Hydrochlorothiazide (HCTZ) 25mg Tablet 1 tab daily     Premarin 0.625mg/1gm Vaginal Cream     Novalog insulin to carb ratio     Amlodipine  10mg Tablet 1 tab daily         OBJECTIVE:        Vitals:         Historical:     06/14/2019  BP:   124/54 mm Hg ( (left arm, ,  sitting, );)     06/14/2019  Wt:   135.8lbs        Current: 9/19/2019 9:15:54 AM    Ht:  5 ft, 2.75 in;  Wt: 132.8 lbs;  BMI: 23.7    T: 97.9 F (oral);  BP: 139/61 mm Hg (left arm, sitting);  P: 47 bpm (left arm (BP Cuff), sitting);  sCr: 0.79 mg/dL;  GFR: 65.30        Exams:     PHYSICAL EXAM:     GENERAL:  well developed and nourished; appropriately groomed; in no apparent distress;     E/N/T: EARS: bilateral TMs are normal;  NOSE: nasal mucosa is erythematous;  OROPHARYNX: posterior pharynx, including tonsils, tongue, and uvula are normal;     RESPIRATORY: normal respiratory rate and pattern with no distress; normal breath sounds with no rales, rhonchi, wheezes or rubs;     CARDIOVASCULAR: normal rate; rhythm is regular;     LYMPHATIC: no enlargement of cervical or facial nodes;     MUSCULOSKELETAL:  Normal range of motion, strength and tone;     NEUROLOGIC: mental status: alert and oriented x 3; GROSSLY INTACT     PSYCHIATRIC:  appropriate affect and demeanor; normal speech pattern; grossly normal memory;         Lab/Test Results:             Glucose, Urine:  500 mg/dL (05/24/2018),  Neg (09/19/2019),     Bilirubin, urine:  Negative (09/19/2019),     Ketones, Urine Strip:  Negative (09/19/2019),     Specific Gravity, urine:  1.020 (09/19/2019),     Blood in Urine:  negative (09/19/2019),     pH, urine:  7.0 (09/19/2019),     Protein Urine QL:  negative (09/19/2019),     Urobilinogen, urine:  0.2 E.U./dL (09/19/2019),     Nitrite, Urine:  Negative (09/19/2019),     Leukoctyes, urine:  Negative (09/19/2019),     Appearance:  Clear (09/19/2019),     collection source:  Clean-catch (09/19/2019),     Color:  Yellow (09/19/2019),     Performed by::  AS (09/19/2019),             ASSESSMENT           788.1   R30.0  Dysuria              DDx:     477.0   J30.9  Seasonal allergies              DDx:         ORDERS:         Lab Orders:       [id number]  Urinalysis, automated, without microscopy  (In-House)                   PLAN:  "         Dysuria         RECOMMENDATIONS given include: increase oral fluid intake, wipe front-to-back after urinating, avoid \"holding\" urine, urinate after intercourse, and UA normal except for glucose present (known diabetic).  advise azo or pyridium prn for the next 2-3 days.  decrease intake of caffeine and carbonated beverages.  ok to repeat UA with micro in one week if symptoms are persisting.            Orders:       [id number]  Urinalysis, automated, without microscopy  (In-House)            Seasonal allergies        ok for plain loratadine 10 mg daily.  will purchase over the counter.  follow up for persisting concerns.              Patient Recommendations:        For  Dysuria:     Increase your intake of oral fluids. Wipe front-to-back with toilet paper after urinating to help prevent future infections. Avoid \"holding\" your urine; empty your bladder when you first feel the urge to urinate. Urinate immediately after intercourse to help avoid future bladder infections.              CHARGE CAPTURE           **Please note: ICD descriptions below are intended for billing purposes only and may not represent clinical diagnoses**        Primary Diagnosis:         788.1 Dysuria            R30.0    Dysuria              Orders:          [id number]   Office/outpatient visit; established patient, level 3  (In-House)             15086   Urinalysis, automated, without microscopy  (In-House)           477.0 Seasonal allergies            J30.9    Allergic rhinitis, unspecified           "

## 2021-05-18 NOTE — PROGRESS NOTES
Yohana CasillasValery 1950     Office/Outpatient Visit    Visit Date: Tue, Mar 13, 2018 11:33 am    Provider: Shanthi Caceres N.P. (Assistant: Kelly Caceres MA)    Location: [facility]        Electronically signed by Shanthi Caceres N.P. on  03/13/2018 12:43:09 PM                             SUBJECTIVE:        CC:     Mrs. Casillas is a 67 year old White female.  Patient is here for diabetic check up and would like to talk about Prevnar vaccination.;         HPI:         Patient presents with iDDM.  She reports home blood glucose readings have averaged fasting readings in the 110-140 mg/dL range.  Most recent lab results include Hemoglobin A1c:  7.0 (%) (11/25/2017), LDL:  70 (mg/dL) (11/25/2017), HDL:  99 (mg/dL) (11/25/2017), Triglycerides:  60 (mg/dL) (11/25/2017), Microalbuminuria:  34.5 (mg/g creat) (11/25/2017).  Sees Ame Key, appt in 5-2018.     ROS:     CONSTITUTIONAL:  Negative for chills, fatigue, fever, and weight change.      CARDIOVASCULAR:  Positive for pedal edema ( improved, now on HCTZ and Norvasc dose decreased ).   Negative for chest pain.      RESPIRATORY:  Negative for cough, dyspnea, and hemoptysis.      NEUROLOGICAL:  Negative for dizziness, headaches, paresthesias, and weakness.          PMH/FMH/SH:     Last Reviewed on 3/13/2018 12:03 PM by Shanthi Caceres    Past Medical History:                 PAST MEDICAL HISTORY         Hyperlipidemia: Hypercholesterolemia;     Hypertension     Type 1 Diabetes: controlled;     retinopathy, now being monitored, per Dr. Turner Hospitalizations: uti and drug reaction , at [facility]         CURRENT MEDICAL PROVIDERS:    Cardiologist: Reese    Urologist: dr sara Key NP/CDE         PREVENTIVE HEALTH MAINTENANCE             BONE DENSITY: was last done 2010 osteopenia     COLORECTAL CANCER SCREENING: colonoscopy with normal results     Hepatitis C Medicare Screening: was last done      MAMMOGRAM: was last  done 2016 with the following abnormalaties noted-- required more images on left breast     PAP SMEAR: hysterectomy         Surgical History:         Hysterectomy: Partial;      thyroidplasty 11      Thyroidectomy: small portion remains;      Bilateral Tubal Ligation    Bilateral Carpal Tunnel;    right index finger, trigger release and right wrist cyst removed 11; Procedures: colonoscopy      Cataract Removal: bilateral; 9&10- 2016;     Procedures:    Colonoscopy ( 14 )    EGD ( 14 )         Family History:         Positive for Hypertension ( brother ).      Positive for Lung Cancer ( father; mother ).      Positive for Seizure Disorder ( brother ).  Father:  at age 63; Cause of death was lung cancer     Mother:  at age 69; Cause of death was adrenal cancer;  Lung Cancer         Social History:     Occupation: [employer]     Marital Status:      Children: 2 children         Tobacco/Alcohol/Supplements:     Last Reviewed on 3/13/2018 11:37 AM by Kelly Caceres    Tobacco: She has never smoked.  Non-drinker             Immunizations:     Fluzone (3 + years dose) 10/17/2011     Fluzone Quadrivalent (3+ years) 11/10/2016     Influenza A (H1N1) pf, IM (3+ years) Monovalent 2009     Fluzone High-Dose pf (>=65 yr) 2017     Pneumococcal, 23-valent IM/SC (adult and pt >=2yr) 2009     Adacel (Tdap) 2012     Zostavax (Zoster) 2013         Allergies:     Last Reviewed on 3/13/2018 11:35 AM by Kelly Caceres    Aspirin: stomach pain (Adverse Reaction)    Sulfas:    Levaquin:        Current Medications:     Last Reviewed on 3/13/2018 11:33 AM by Kelly Caceres    Lexapro 10mg Tablet 1 tab daily     Simvastatin 20mg Tablet Take 1 tablet(s) by mouth daily     Synthroid 0.075mg Tablet Take 1 tablet(s) by mouth daily--DO NOT SUBSTITUTE!!!!!     Fluticasone Propionate 50mcg/1actuation Nasal Spray 1 or 2 sprays in each nostril qday     Lisinopril  40mg Tablet Take 1 tablet(s) by mouth daily     Aspirin (ASA) 81mg Chewable Tablet Chew 1 tablet(s) by mouth qam     Premarin 0.625mg/1gm Vaginal Cream     Protonix 40mg Tablets, Delayed Release 1 tab daily     Novalog insulin to carb ratio     Amlodipine  10mg Tablet 1 tab daily     Hydrochlorothiazide (HCTZ) 25mg Tablet 1 tab daily         OBJECTIVE:        Vitals:         Current: 3/13/2018 11:35:35 AM    Ht:  5 ft, 2.75 in;  Wt: 131.2 lbs;  BMI: 23.4    T: 97.6 F (oral);  BP: 118/72 mm Hg (left arm, sitting);  P: 57 bpm (left arm (BP Cuff), sitting);  sCr: 0.81 mg/dL;  GFR: 64.19        Exams:     PHYSICAL EXAM:     GENERAL: vital signs recorded - well developed, well nourished;  no apparent distress;     NECK: carotid exam reveals no bruits;     RESPIRATORY: normal respiratory rate and pattern with no distress; normal breath sounds with no rales, rhonchi, wheezes or rubs;     CARDIOVASCULAR: normal rate; rhythm is regular;  no systolic murmur; no edema;     PSYCHIATRIC:  appropriate affect and demeanor; normal speech pattern; grossly normal memory;         ASSESSMENT           250.01   E10.9  IDDM              DDx:     244.8   E01.8   E03.8  Acquired hypothyroidism              DDx:         ORDERS:         Lab Orders:       FUTURE  Future order to be done at patients convenience  (Send-Out)         [id number]  [address] LIPID,CMP, A1C: [id number], [id number], [id number]  (Send-Out)         FUTURE  Future order to be done at patients convenience  (Send-Out)         [id number]  [facility] TSH  (Send-Out)                   PLAN:          IDDM needs her prevnar, will come in to get when she will be off work         FOLLOW-UP TESTING #1: FOLLOW-UP LABORATORY:  Labs to be scheduled in the future include Diabetes Panel 1; CMP, Lipid, A1C.      FOLLOW-UP: fasting for lab, will fwd a copy to Ame Key           Orders:       FUTURE  Future order to be done at patients convenience  (Send-Out)         [id number]  [address] [place] [address] LIPID,CMP, A1C:  [id number], [id number], [id number]  (Send-Out)            Acquired hypothyroidism         FOLLOW-UP TESTING #1: FOLLOW-UP LABORATORY:  Labs to be scheduled in the future include TSH.            Orders:       FUTURE  Future order to be done at patients convenience  (Send-Out)         [id number]  TSH - [facility] TSH  (Send-Out)               Patient Recommendations:        For  IDDM:             The following laboratory testing has been ordered:         For  Acquired hypothyroidism:             The following laboratory testing has been ordered: TSH             CHARGE CAPTURE           **Please note: ICD descriptions below are intended for billing purposes only and may not represent clinical diagnoses**        Primary Diagnosis:         250.01 IDDM            E10.9    Type 1 diabetes mellitus without complications              Orders:          [id number]   Office/outpatient visit; established patient, level 3  (In-House)           244.8 Acquired hypothyroidism            E01.8    Other iodine-deficiency related thyroid disorders and allied conditions           E03.8    Other specified hypothyroidism

## 2021-05-18 NOTE — PROGRESS NOTES
Yohana Casillas 1950     Office/Outpatient Visit    Visit Date: Wed, Oct 23, 2019 11:38 am    Provider: Shanthi Caceres N.P. (Assistant: Janet Wang MA)    Location: [facility]        Electronically signed by Shanthi Caceres N.P. on  10/23/2019 12:40:38 PM                             SUBJECTIVE:        CC:     Ms. Casillas is a 69 year old White female.  This is a follow-up visit.          HPI:         Ms. Casillas presents with mixed hyperlipidemia.  Current treatment includes Zocor.  Compliance with treatment has been good; she takes her medication as directed.  She denies experiencing any hypercholesterolemia related symptoms.          In regard to the acquired hypothyroidism, she is currently taking Levothyroid, 88 mcg daily.  TSH was last checked >2 months ago.  The result was reported as low.  Currently, she is experiencing hair loss.  (prefers synthroid, thought  she was suppose to be on 75, but [facility] told her no, she was on 88 )     ROS:     CONSTITUTIONAL:  Negative for chills, fatigue, fever, and weight change.      E/N/T:  Positive for had inj on her vocal cords, better, follow up in .      CARDIOVASCULAR:  Positive for she sees cardiology for her BP rx.   Negative for chest pain or palpitations.      RESPIRATORY:  Negative for cough, dyspnea, and hemoptysis.      NEUROLOGICAL:  Negative for dizziness, headaches, paresthesias, and weakness.          PMH/FMH/SH:     Last Reviewed on 10/23/2019 12:02 PM by Shanthi Caceres    Past Medical History:                 PAST MEDICAL HISTORY         Hyperlipidemia: Hypercholesterolemia;     Hypertension     Type 1 Diabetes: controlled;     retinopathy, now being monitored, per Dr. Turner Hospitalizations: uti and drug reaction , at [facility]         CURRENT MEDICAL PROVIDERS:    Cardiologist: Reese    Urologist: dr sara Key NP/CDE         PREVENTIVE HEALTH MAINTENANCE             BONE DENSITY: was last done 2010  osteopenia     COLORECTAL CANCER SCREENING: colonoscopy with normal results     Hepatitis C Medicare Screening: was last done      MAMMOGRAM: was last done 2016 with the following abnormalaties noted-- required more images on left breast     PAP SMEAR: hysterectomy         Surgical History:         Hysterectomy: Partial;      thyroidplasty 11      Thyroidectomy: small portion remains;      Bilateral Tubal Ligation    Bilateral Carpal Tunnel;    right index finger, trigger release and right wrist cyst removed 11; Procedures: colonoscopy      Cataract Removal: bilateral; 9&10- 2016;     Cholecystectomy: laparoscopic; 19;     Procedures:    Colonoscopy ( 14 )    EGD ( 14 ) left vocal cord surgery 19         Family History:         Positive for Hypertension ( brother ).      Positive for Lung Cancer ( father; mother ).      Positive for Seizure Disorder ( brother ).  Father:  at age 63; Cause of death was lung cancer     Mother:  at age 69; Cause of death was adrenal cancer;  Lung Cancer         Social History:     Occupation: [employer] 3-4 days a week     Marital Status:      Children: 2 children         Tobacco/Alcohol/Supplements:     Last Reviewed on 10/23/2019 11:38 AM by Janet Wang    Tobacco: She has never smoked.  Non-drinker             Immunizations:     Fluzone (3 + years dose) 10/17/2011     Fluzone Quadrivalent (3+ years) 11/10/2016     Influenza A (H1N1) pf, IM (3+ years) Monovalent 2009     Fluzone High-Dose pf (>=65 yr) 2017     Pneumococcal, 23-valent IM/SC (adult and pt >=2yr) 2009     Adacel (Tdap) 2012     Zostavax (Zoster live) 2013         Allergies:     Last Reviewed on 10/23/2019 11:38 AM by Janet Wang    Aspirin: stomach pain (Adverse Reaction)    Sulfas:    Levaquin:        Current Medications:     Last Reviewed on 10/23/2019 11:38 AM by Janet Wang    Synthroid 0.088mg Tablet Take 1  tablet(s) by mouth daily was suppose to be on 75 mcg /ab     Simvastatin 20mg Tablet Take 1 tablet(s) by mouth daily     Fluticasone Propionate 50mcg/1actuation Nasal Spray 1 or 2 sprays in each nostril qday     Aspirin (ASA) 81mg Chewable Tablet Chew 1 tablet(s) by mouth qam     Lisinopril 40mg Tablet Take 1 tablet(s) by mouth daily     Hydrochlorothiazide (HCTZ) 25mg Tablet 1 tab daily     Premarin 0.625mg/1gm Vaginal Cream     Novalog insulin to carb ratio     Amlodipine  10mg Tablet 1 tab daily         OBJECTIVE:        Vitals:         Current: 10/23/2019 11:44:41 AM    Ht:  5 ft, 2.75 in;  Wt: 132.6 lbs;  BMI: 23.7    T: 97.4 F (oral);  BP: 129/54 mm Hg (left arm, sitting);  P: 60 bpm (left arm (BP Cuff), sitting);  sCr: 0.79 mg/dL;  GFR: 64.40        Exams:     PHYSICAL EXAM:     GENERAL: vital signs recorded - well developed, well nourished;  no apparent distress;     NECK: carotid exam reveals no bruits;     RESPIRATORY: normal respiratory rate and pattern with no distress; normal breath sounds with no rales, rhonchi, wheezes or rubs;     CARDIOVASCULAR: normal rate; rhythm is regular;  no systolic murmur; no edema;     PSYCHIATRIC:  appropriate affect and demeanor; normal speech pattern; grossly normal memory;         Procedures:     Vaccination against other viral diseases, Influenza     1. Influenza high dose 0.5 ml unit dose, ABN signed given IM in the right upper arm; administered by  Regarding contraindications to an Influenza vaccine: Denies moderate/severe illness with/without fever; serious reaction to eggs, egg proteins, gentamicin, gelatin, arginine, neomycin or polymixin; serious reaction after recieving previous influenza vaccines; and history of Guillain-Irwin Syndrome.              ASSESSMENT           V04.81   Z23  Vaccination against other viral diseases, Influenza              DDx:     272.2   E78.2  Mixed hyperlipidemia              DDx:     244.8   E01.8   E03.8  Acquired hypothyroidism               DDx:     250.01   E10.9  IDDM              DDx:         ORDERS:         Meds Prescribed:       Refill of: Simvastatin 20mg Tablet Take 1 tablet(s) by mouth daily  #90 (Ninety) tablet(s) Refills: 1         Lab Orders:       FUTURE  Future order to be done at patients convenience  (Send-Out)         [address]  [facility] [place] [address] CMP A1C LIPID AND MICRO ALBUM CR RATIO: [id number]  (Send-Out)         FUTURE  Future order to be done at patients convenience  (Send-Out)         92918  [facility] TSH  (Send-Out)           Procedures Ordered:       03180  Fluzone High Dose  (In-House)           Other Orders:         Administration of influenza virus vaccine (x1)                 PLAN:          Vaccination against other viral diseases, Influenza           Orders:       93376  Fluzone High Dose  (In-House)                     Administration of influenza virus vaccine (x1)          Mixed hyperlipidemia         RECOMMENDATIONS given include: low cholesterol/low fat diet.      FOLLOW-UP: fasting for labs, will come in Saturday           Prescriptions:       Refill of: Simvastatin 20mg Tablet Take 1 tablet(s) by mouth daily  #90 (Ninety) tablet(s) Refills: 1          Acquired hypothyroidism may need to be on  75, and make sure to send the rx DNS, will send after her labs, getting Saturday         FOLLOW-UP TESTING #1: FOLLOW-UP LABORATORY:  Labs to be scheduled in the future include TSH.            Orders:       FUTURE  Future order to be done at patients convenience  (Send-Out)         34957  [facility] TSH  (Send-Out)            IDDM will fwd to cade Perez with her is 10-30-19  avg FBS 80's /has an insulin pump         FOLLOW-UP TESTING #1: FOLLOW-UP LABORATORY:  Labs to be scheduled in the future include Diabetes Panel 2;CMP, A1C, Lipid, Microalbumin:Creatinine Ratio.            Orders:       FUTURE  Future order to be done at patients convenience  (Send-Out)         [address]176  [facility] [place] [address] CMP  A1C LIPID AND MICRO ALBUM CR RATIO: [id number]  (Send-Out)               Patient Recommendations:        For  Mixed hyperlipidemia:     Reduce the amount of cholesterol and saturated fat in your diet.          For  Acquired hypothyroidism:             The following laboratory testing has been ordered: TSH         For  IDDM:             The following laboratory testing has been ordered:             CHARGE CAPTURE           **Please note: ICD descriptions below are intended for billing purposes only and may not represent clinical diagnoses**        Primary Diagnosis:         V04.81 Vaccination against other viral diseases, Influenza            Z23    Encounter for immunization              Orders:          76611   Office/outpatient visit; established patient, level 4  (In-House)             26776   Fluzone High Dose  (In-House)                                           Administration of influenza virus vaccine (x1)         272.2 Mixed hyperlipidemia            E78.2    Mixed hyperlipidemia    244.8 Acquired hypothyroidism            E01.8    Other iodine-deficiency related thyroid disorders and allied conditions           E03.8    Other specified hypothyroidism    250.01 IDDM            E10.9    Type 1 diabetes mellitus without complications

## 2021-05-18 NOTE — PROGRESS NOTES
Yohana Casillas 1950     Office/Outpatient Visit    Visit Date: Wed, Mar 20, 2019 03:08 pm    Provider: Shanthi Caceres N.P. (Assistant: Marii Mason MA)    Location: [facility]        Electronically signed by Shanthi Caceres N.P. on  03/20/2019 04:18:18 PM                             SUBJECTIVE:        CC:     Mrs. Casillas is a 68 year old White female.  This is a follow-up visit.  med refills; wants urine checked to make sure UTI has cleared up;         HPI:         Patient to be evaluated for dysuria.  This began within the last 3 days.  She had symptoms three days ago of burning, none since then, wants her urine rechecked.          Dx with mixed hyperlipidemia; current treatment includes Zocor.  Most recent lab tests include TSH:  0.711 (mIU/L) (10/11/2018), Microalbumin, Urine, rand:  <12.0 (mg/L) (10/11/2018), Glucose, Serum:  46 (mg/dL) (11/16/2018), Hemoglobin A1c:  7.4 (%) (10/11/2018), Total Cholesterol:  197 (mg/dL) (10/11/2018), HDL:  86 (mg/dL) (10/11/2018), Triglycerides:  56 (mg/dL) (10/11/2018), LDL:  100 (mg/dL) (10/11/2018).          Concerning acquired hypothyroidism, she is currently taking Synthroid, 75 mcg daily.  TSH was last checked >4 months ago.  The result was reported as normal.      ROS:     CONSTITUTIONAL:  Negative for fever.      GASTROINTESTINAL:  Positive for acid reflux symptoms ( would  like a refill of protonix ).      GENITOURINARY:  Negative for hematuria and vaginal discharge.      MUSCULOSKELETAL:  Negative for back pain.      ENDOCRINE:  Positive for sees Ame Key next appt, sugars run around 120 in am.          PMH/FMH/:     Last Reviewed on 3/20/2019 03:26 PM by Shanthi Caceres    Past Medical History:                 PAST MEDICAL HISTORY         Hyperlipidemia: Hypercholesterolemia;     Hypertension     Type 1 Diabetes: controlled;     retinopathy, now being monitored, per Dr. Turner Hospitalizations: uti and drug reaction , at [facility]         CURRENT MEDICAL PROVIDERS:    Cardiologist: Reese    Urologist: dr sara Key NP/CDE         PREVENTIVE HEALTH MAINTENANCE             BONE DENSITY: was last done  osteopenia     COLORECTAL CANCER SCREENING: colonoscopy with normal results     Hepatitis C Medicare Screening: was last done      MAMMOGRAM: was last done 2016 with the following abnormalaties noted-- required more images on left breast     PAP SMEAR: hysterectomy         Surgical History:         Hysterectomy: Partial;      thyroidplasty 11      Thyroidectomy: small portion remains;      Bilateral Tubal Ligation    Bilateral Carpal Tunnel;    right index finger, trigger release and right wrist cyst removed 11; Procedures: colonoscopy      Cataract Removal: bilateral; 9&10- 2016;     Cholecystectomy: laparoscopic; 19;     Procedures:    Colonoscopy ( 14 )    EGD ( 14 )         Family History:         Positive for Hypertension ( brother ).      Positive for Lung Cancer ( father; mother ).      Positive for Seizure Disorder ( brother ).  Father:  at age 63; Cause of death was lung cancer     Mother:  at age 69; Cause of death was adrenal cancer;  Lung Cancer         Social History:     Occupation: [employer] de[employer] 3-4 days a week     Marital Status:      Children: 2 children         Tobacco/Alcohol/Supplements:     Last Reviewed on 3/20/2019 03:10 PM by Marii Mason    Tobacco: She has never smoked.  Non-drinker             Immunizations:     Fluzone (3 + years dose) 10/17/2011     Fluzone Quadrivalent (3+ years) 11/10/2016     Influenza A (H1N1) pf, IM (3+ years) Monovalent 2009     Fluzone High-Dose pf (>=65 yr) 2017     Pneumococcal, 23-valent IM/SC (adult and pt >=2yr) 2009     Adacel (Tdap) 2012     Zostavax (Zoster live) 2013         Allergies:     Last Reviewed on 3/20/2019 03:10 PM by Marii Mason    Aspirin:  stomach pain (Adverse Reaction)    Sulfas:    Levaquin:        Current Medications:     Last Reviewed on 3/20/2019 03:11 PM by Marii Mason    Synthroid 0.075mg Tablet Take 1 tablet(s) by mouth daily--DO NOT SUBSTITUTE!!!!!     Simvastatin 20mg Tablet Take 1 tablet(s) by mouth daily     Fluticasone Propionate 50mcg/1actuation Nasal Spray 1 or 2 sprays in each nostril qday     Lisinopril 40mg Tablet Take 1 tablet(s) by mouth daily     Aspirin (ASA) 81mg Chewable Tablet Chew 1 tablet(s) by mouth qam     Hydrochlorothiazide (HCTZ) 25mg Tablet 1 tab daily     Premarin 0.625mg/1gm Vaginal Cream     Novalog insulin to carb ratio     Amlodipine  10mg Tablet 1 tab daily         OBJECTIVE:        Vitals:         Current: 3/20/2019 3:13:30 PM    Ht:  5 ft, 2.75 in;  Wt: 130.2 lbs;  BMI: 23.2    T: 97.7 F (oral);  BP: 133/65 mm Hg (left arm, sitting);  P: 61 bpm (left arm (BP Cuff), sitting);  sCr: 0.77 mg/dL;  GFR: 66.43        Exams:     PHYSICAL EXAM:     GENERAL: vital signs recorded - well developed, well nourished;  no apparent distress;     RESPIRATORY: normal respiratory rate and pattern with no distress; normal breath sounds with no rales, rhonchi, wheezes or rubs;     CARDIOVASCULAR: normal rate; rhythm is regular;  no systolic murmur; no edema;     MUSCULOSKELETAL: no CVA tenderness;     PSYCHIATRIC:  appropriate affect and demeanor; normal speech pattern; grossly normal memory;         ASSESSMENT           788.1   R30.0  Dysuria              DDx:     272.2   E78.2  Mixed hyperlipidemia              DDx:     244.8   E01.8   E03.8  Acquired hypothyroidism              DDx:     530.81   K21.9  GERD              DDx:     250.01   E10.9  IDDM              DDx:         ORDERS:         Meds Prescribed:       Refill of: Synthroid (Levothyroxine Sodium) 0.075mg Tablet Take 1 tablet(s) by mouth daily--DO NOT SUBSTITUTE!!!!!  #90 (Ninety) tablet(s) Refills: 0       Refill of: Simvastatin 20mg Tablet Take 1 tablet(s)  by mouth daily  #90 (Ninety) tablet(s) Refills: 0       Refill of: Protonix (Pantoprazole) 40mg Tablets, Delayed Release Take 1 tablet(s) by mouth daily  #90 (Ninety) tablet(s) Refills: 1         Lab Orders:       [id number]  [facility] - [facility] Urinalysis, automated, with micro  (Send-Out)         FUTURE  Future order to be done at patients convenience  (Send-Out)         89698  [facility] - [facility] LIPID,CMP, A1C: 85048, 75866, 70639  (Send-Out)         FUTURE  Future order to be done at patients convenience  (Send-Out)         20164  TSH [facility] TSH  (Send-Out)         53210  [facility] Urine Culture  (Send-Out)                   PLAN:          Dysuria reviewed last urine dip and culture,  no urinalysis done and the culture was negative, but was treated with Macrobid for 10 days     LABORATORY:  Labs ordered to be performed today include Urine culture.      RECOMMENDATIONS given include: increase oral fluid intake     FOLLOW-UP: Advised to call if there is no improvement in 2 day(s).            Orders:       39793  [facility] - [facility] Urinalysis, automated, with micro  (Send-Out)         40260  [facility] Urine Culture  (Send-Out)             Patient Education Handouts:       Bladder Infection (Women)           Mixed hyperlipidemia           Prescriptions:       Refill of: Simvastatin 20mg Tablet Take 1 tablet(s) by mouth daily  #90 (Ninety) tablet(s) Refills: 0          Acquired hypothyroidism         FOLLOW-UP TESTING #1: FOLLOW-UP LABORATORY:  Labs to be scheduled in the future include TSH.            Prescriptions:       Refill of: Synthroid (Levothyroxine Sodium) 0.075mg Tablet Take 1 tablet(s) by mouth daily--DO NOT SUBSTITUTE!!!!!  #90 (Ninety) tablet(s) Refills: 0           Orders:       FUTURE  Future order to be done at patients convenience  (Send-Out)         92896  TSH - [facility] TSH  (Send-Out)            GERD           Prescriptions:       Refill of: Protonix (Pantoprazole) 40mg Tablets, Delayed Release Take 1 tablet(s) by mouth daily   #90 (Ninety) tablet(s) Refills: 1          IDDM will fwd a copy of labs to Ame Key         FOLLOW-UP TESTING #1: FOLLOW-UP LABORATORY:  Labs to be scheduled in the future include Diabetes Panel 1; CMP, Lipid, A1C.            Orders:       FUTURE  Future order to be done at patients convenience  (Send-Out)         [address]  [address] LIPID,CMP, A1C: 83211, 16079, [address]122  (Send-Out)               Patient Recommendations:        For  Dysuria:     Increase your intake of oral fluids.          For  Acquired hypothyroidism:             The following laboratory testing has been ordered: TSH         For  IDDM:             The following laboratory testing has been ordered:             CHARGE CAPTURE           **Please note: ICD descriptions below are intended for billing purposes only and may not represent clinical diagnoses**        Primary Diagnosis:         788.1 Dysuria            R30.0    Dysuria              Orders:          11747   Office/outpatient visit; established patient, level 4  (In-House)           272.2 Mixed hyperlipidemia            E78.2    Mixed hyperlipidemia    244.8 Acquired hypothyroidism            E01.8    Other iodine-deficiency related thyroid disorders and allied conditions           E03.8    Other specified hypothyroidism    530.81 GERD            K21.9    Gastro-esophageal reflux disease without esophagitis    250.01 IDDM            E10.9    Type 1 diabetes mellitus without complications

## 2021-05-18 NOTE — PROGRESS NOTES
Yohana Casillas Roxy  1950     Office/Outpatient Visit    Visit Date: Wed, May 13, 2020 10:34 am    Provider: Shanthi Caceres N.P. (Assistant: Nacho Ha, )    Location: [facility]        Electronically signed by Shanthi Caceres N.P. on  05/13/2020 11:04:59 AM                             Subjective:        CC: Ms. Casillas is a 69 year old White female.  This is a follow-up visit.  (AMLODIPINE IS 5 MG)DOXGigantt VIDEO [id number]        HPI:  Yohana consented to this telemedicine visit.    - Persons present during the telemedicine consultation include: Yohana - patient, CA Caceres APRN    - This visit is being conducted with audio and video                  Patient presents with other specified hypothyroidism.  She is currently taking Levothyroid, 75 mcg daily.  TSH was last checked 5 months ago.  The result was reported as normal.            With regard to the mixed hyperlipidemia, current treatment includes Zocor and a low cholesterol/low fat diet.  Most recent lab tests include TSH:  0.211 (mIU/L) (10/31/2019),  2.250 (mIU/L) (12/30/2019), Hemoglobin A1c:  7.3 (%) (10/31/2019), LDL:  79 (mg/dL) (10/31/2019), HDL:  86 (mg/dL) (10/31/2019), Triglycerides:  45 (mg/dL) (10/31/2019), Microalbuminuria:  10.7 (mg/g creat) (10/31/2019), Dilated Eye Exam by date:  04/09/2019 (03/22/2019), Foot Exam (Annual):  09/24/2018 (09/24/2018).  stopped zocor due to her aches and pains, and they have resolved since she stopped taking zocor           Essential (primary) hypertension details; her current cardiac medication regimen includes a diuretic ( Hydrochlorothiazide (HCTZ) ), an ACE inhibitor ( Zestril ), and a calcium channel blocker ( Norvasc ).  She did not bring her blood pressure diary, but says that typical readings show systolics in the 120s and diastolics in the 60's.  She is tolerating the medication well without side effects.  Compliance with treatment has been good; she takes her medication as  directed.      ROS:     CONSTITUTIONAL:  Negative for fever.      EYES:  Negative for blurred vision.      CARDIOVASCULAR:  Positive for pedal edema ( mild ).   Negative for chest pain.      RESPIRATORY:  Negative for recent cough and dyspnea.      GENITOURINARY:  Positive for frequent urination.   Negative for dysuria.      MUSCULOSKELETAL:  Negative for problem with her feet.      NEUROLOGICAL:  Negative for dizziness, headaches, paresthesias, and weakness.      ENDOCRINE:  Positive for 7.8 A1C/ tele health visit, pump settings were reset , sugars run fasting 130's.          Past Medical History / Family History / Social History:         Last Reviewed on 5/13/2020 10:44 AM by Shanthi Caceres    Past Medical History:                 PAST MEDICAL HISTORY         Hyperlipidemia: Hypercholesterolemia;     Hypertension     Type 1 Diabetes: controlled;     retinopathy, now being monitored, per Dr. Turner Hospitalizations: uti and drug reaction , at [facility]         CURRENT MEDICAL PROVIDERS:    Cardiologist: Reese    Urologist: dr sara Key NP/CDE         PREVENTIVE HEALTH MAINTENANCE             BONE DENSITY: was last done 2010 osteopenia     COLORECTAL CANCER SCREENING: colonoscopy with normal results     Hepatitis C Medicare Screening: was last done      MAMMOGRAM: was last done 9/2016 with the following abnormalaties noted-- required more images on left breast     PAP SMEAR: hysterectomy         Surgical History:         Hysterectomy: Partial;     thyroidplasty 12-22-11     Thyroidectomy: small portion remains;     Bilateral Tubal Ligation    Bilateral Carpal Tunnel;    right index finger, trigger release and right wrist cyst removed 12-21-11; Procedures: colonoscopy 2004     Cataract Removal: bilateral; 9&10- 2016;     Cholecystectomy: laparoscopic; 1-4-19;     Procedures:    Colonoscopy ( 12-18-14 )    EGD ( 12-18-14 ) left vocal cord surgery 9-26-19         Family History:          Positive for Hypertension ( brother ).      Positive for Lung Cancer ( father; mother ).      Positive for Seizure Disorder ( brother ).  Father:  at age 63; Cause of death was lung cancer     Mother:  at age 69; Cause of death was adrenal cancer;  Lung Cancer         Social History:     Occupation: [employer] 3-4 days a week     Marital Status:      Children: 2 children         Tobacco/Alcohol/Supplements:     Last Reviewed on 2020 10:35 AM by Nacho Ha    Tobacco: She has never smoked.  Non-drinker         Immunizations:     Fluzone (3 + years dose) 10/17/2011    Fluzone Quadrivalent (3+ years) 11/10/2016    Influenza A (H1N1) pf, IM (3+ years) Monovalent 2009    Fluzone High-Dose pf (>=65 yr) 2017    Fluzone High-Dose pf (>=65 yr) 10/23/2019    Pneumococcal, 23-valent IM/SC (adult and pt >=2yr) 2009    Adacel (Tdap) 2012    Zostavax (Zoster live) 2013        Allergies:     Last Reviewed on 10/23/2019 11:38 AM by Janet Wang    Aspirin: Stomach pain  (Adverse Reaction)    Sulfas:      Levaquin:          Current Medications:     Last Reviewed on 2020 10:36 AM by Nacho Ha    Lisinopril 40mg Tablet [Take 1 tablet(s) by mouth daily]    Simvastatin 20 mg oral tablet [Take 1 tablet(s) by mouth daily]    levothyroxine 75 mcg oral tablet [TAKE ONE TABLET BY MOUTH DAILY]    aspirin 81 mg oral tablet,chewable [Chew 1 tablet(s) by mouth qam]    amLODIPine 10 mg oral tablet [1 / 2 tab daily]    Novalog insulin to carb ratio     Fluticasone Propionate 50mcg/1actuation Nasal Spray [1 or 2 sprays in each nostril qday ]    Premarin 0.625 mg/gram Vaginal Cream    hydroCHLOROthiazide 25 mg oral tablet [1 tab daily]    Lexapro 10 mg oral tablet [take 1 tablet (10 mg) by oral route once daily]        Objective:        Exams:     PHYSICAL EXAM:     GENERAL: vital signs recorded - well developed, well nourished;  no apparent distress;     RESPIRATORY:  normal appearance and symmetric expansion of chest wall;     NEUROLOGIC: GROSSLY INTACT     PSYCHIATRIC:  appropriate affect and demeanor; normal speech pattern; grossly normal memory;         Assessment:         E03.8   Other specified hypothyroidism       E78.2   Mixed hyperlipidemia       I10   Essential (primary) hypertension       E10.9   Type 1 diabetes mellitus without complications       R35.0   Frequency of micturition       788.41   Frequent urination   (Mild)         ORDERS:         Lab Orders:       FUTURE  Future order to be done at patients convenience  (Send-Out)            92408  TSH - [facility] TSH  (Send-Out)            FUTURE  Future order to be done at patients convenience  (Send-Out)            14947  HTN - [facility] CMP AND LIPID: [id number]  (Send-Out)            FUTURE  Future order to be done at patients convenience  (Send-Out)            31446  [facility] access hospital - [facility] Urinalysis, automated, with micro  (Send-Out)                      Plan:         Other specified hypothyroidismreviewed last labs, TSH was up and then normal in     Telehealth: Verbal consent obtained for visit to occur via televideo conferencing     FOLLOW-UP TESTING #1: FOLLOW-UP LABORATORY:  Labs to be scheduled in the future include TSH.            Orders:       FUTURE  Future order to be done at patients convenience  (Send-Out)            50668  TSH - [facility] TSH  (Send-Out)              Mixed hyperlipidemiashe is off zocor, feels better, will recheck some labs         FOLLOW-UP TESTING #1: FOLLOW-UP LABORATORY:  Labs to be scheduled in the future include HTN/Lipid Panel: CMP, Lipid.      RECOMMENDATIONS given include: exercise and low cholesterol/low fat diet.      FOLLOW-UP: fasting for labs, order to , coming in the am fasting           Orders:       FUTURE  Future order to be done at patients convenience  (Send-Out)            23701  HTN - [facility] CMP AND LIPID: [id number]  (Send-Out)              Essential (primary)  jorge fwd labs to her nephrologist that manages her BP         RECOMMENDATIONS given include: perform routine monitoring of blood pressure with home blood pressure cuff.          Type 1 diabetes mellitus without complicationsget recent labs /she had a tele health visit with Ame Key 4-29-20, sees her again in a couple of months, will schedule with [facility] as soon as they are scheduling pt for eye exams again, continue to monitor her feet        Frequency of micturition        RECOMMENDATIONS given include: increase fluid intake.      FOLLOW-UP:.  :for pending her urinalysis results     FOLLOW-UP TESTING #1: FOLLOW-UP LABORATORY:  Labs to be scheduled in the future include urinalysis.            Orders:       FUTURE  Future order to be done at patients convenience  (Send-Out)            42653  [facility] Urinalysis, automated, with micro  (Send-Out)                  Patient Recommendations:        For  Other specified hypothyroidism:            The following laboratory testing has been ordered: TSH         For  Mixed hyperlipidemia:            The following laboratory testing has been ordered: Maintain a regular exercise program. Reduce the amount of cholesterol and saturated fat in your diet.          For  Essential (primary) hypertension:    Begin monitoring your blood pressure by brief nurse visits at our office, a home blood pressure monitor, or by checking on the machines in pharmacies or stores.  Keep a log of the readings.          For  Frequency of micturition:    Drink plenty of fluids.  Fever increases the loss of fluids and can lead to dehydration.                  APPOINTMENT INFORMATION:        Monday Tuesday Wednesday Thursday Friday Saturday Sunday            Time:___________________AM  PM   Date:_____________________         The following laboratory testing has been ordered: urinalysis             Charge Capture:         Primary Diagnosis:     E03.8  Other specified  hypothyroidism           Orders:      67812  Office/outpatient visit; established patient, level 4  (In-House)              E78.2  Mixed hyperlipidemia     I10  Essential (primary) hypertension     E10.9  Type 1 diabetes mellitus without complications     R35.0  Frequency of micturition     788.41  Frequent urination

## 2021-05-18 NOTE — PROGRESS NOTES
Yohana Casillas 1950     Office/Outpatient Visit    Visit Date: Fri, Jun 14, 2019 01:30 pm    Provider: Harper Hua N.P. (Assistant: Jade Farrell MA)    Location: [facility]        Electronically signed by Harper Hua N.P. on  06/14/2019 06:08:37 PM                             SUBJECTIVE:        CC:     Rashawn Casillas is a 68 year old White female.  presents today due to complaints of rash on bilateral legs X 2 months         HPI:         In regard to the rash, this has been a problem for the past 2 months.  It is located primarily over the left leg and the right leg.  She describes the rash as red and papular.  The rash is pruritic and burning.  Associated symptoms include peripheral edema at times, none today.  Past medical history is significant for Type 1 diabetes.  Rash comes and goes. Worse on left leg.      ROS:     CONSTITUTIONAL:  Negative for chills and fever.      CARDIOVASCULAR:  Negative for chest pain and palpitations.      RESPIRATORY:  Negative for dyspnea and frequent wheezing.      GASTROINTESTINAL:  Negative for abdominal pain and vomiting.      INTEGUMENTARY/BREAST:  Positive for rash.      NEUROLOGICAL:  Negative for dizziness and headaches.          [facility]/SH:     Last Reviewed on 3/20/2019 03:26 PM by Shanthi Caceres    Past Medical History:                 PAST MEDICAL HISTORY         Hyperlipidemia: Hypercholesterolemia;     Hypertension     Type 1 Diabetes: controlled;     retinopathy, now being monitored, per Dr. Turner Hospitalizations: uti and drug reaction , at [facility]         CURRENT MEDICAL PROVIDERS:    Cardiologist: Reese    Urologist: dr sara Key NP/CDE         PREVENTIVE HEALTH MAINTENANCE             BONE DENSITY: was last done 2010 osteopenia     COLORECTAL CANCER SCREENING: colonoscopy with normal results     Hepatitis C Medicare Screening: was last done      MAMMOGRAM: was last done 9/2016 with the following  abnormalaties noted-- required more images on left breast     PAP SMEAR: hysterectomy         Surgical History:         Hysterectomy: Partial;      thyroidplasty 11      Thyroidectomy: small portion remains;      Bilateral Tubal Ligation    Bilateral Carpal Tunnel;    right index finger, trigger release and right wrist cyst removed 11; Procedures: colonoscopy      Cataract Removal: bilateral; 9&10- 2016;     Cholecystectomy: laparoscopic; 19;     Procedures:    Colonoscopy ( 14 )    EGD ( 14 )         Family History:         Positive for Hypertension ( brother ).      Positive for Lung Cancer ( father; mother ).      Positive for Seizure Disorder ( brother ).  Father:  at age 63; Cause of death was lung cancer     Mother:  at age 69; Cause of death was adrenal cancer;  Lung Cancer         Social History:     Occupation: [employer] 3-4 days a week     Marital Status:      Children: 2 children         Tobacco/Alcohol/Supplements:     Last Reviewed on 2019 01:33 PM by Jade Farrell    Tobacco: She has never smoked.  Non-drinker         Substance Abuse History:     Last Reviewed on 2016 02:59 PM by Shantell Garrison    NEGATIVE         Mental Health History:     Last Reviewed on 2016 02:59 PM by Shanetll Garrison        Communicable Diseases (eg STDs):     Last Reviewed on 2016 02:59 PM by Shantell Garrison            Current Problems:     Last Reviewed on 2016 02:59 PM by Shantell Garrison    GERD     Anxiety     Fatigue     Postmenopausal atrophic vaginitis     Atrophic vaginitis-Recurrent UTI's     Hip pain     Leukocytopenia, unspecified     Acquired hypothyroidism     IDDM     Essential hypertension, benign     Mixed hyperlipidemia     Unspecified PVD     Rash     Screening for depression     Urinary tract infection     UTI     Dysuria         Immunizations:     Fluzone (3 + years dose) 10/17/2011     Fluzone Quadrivalent (3+  years) 11/10/2016     Influenza A (H1N1) pf, IM (3+ years) Monovalent 11/18/2009     Fluzone High-Dose pf (>=65 yr) 11/14/2017     Pneumococcal, 23-valent IM/SC (adult and pt >=2yr) 5/26/2009     Adacel (Tdap) 7/11/2012     Zostavax (Zoster live) 9/4/2013         Allergies:     Last Reviewed on 6/14/2019 01:32 PM by aJde Frarell    Aspirin: stomach pain (Adverse Reaction)    Sulfas:    Levaquin:        Current Medications:     Last Reviewed on 6/14/2019 01:32 PM by Jade Farrell    Simvastatin 20mg Tablet Take 1 tablet(s) by mouth daily     Synthroid 0.088mg Tablet Take 1 tablet(s) by mouth daily     Fluticasone Propionate 50mcg/1actuation Nasal Spray 1 or 2 sprays in each nostril qday     Aspirin (ASA) 81mg Chewable Tablet Chew 1 tablet(s) by mouth qam     Lisinopril 40mg Tablet Take 1 tablet(s) by mouth daily     Hydrochlorothiazide (HCTZ) 25mg Tablet 1 tab daily     Premarin 0.625mg/1gm Vaginal Cream     Novalog insulin to carb ratio     Amlodipine  10mg Tablet 1 tab daily         OBJECTIVE:        Vitals:         Current: 6/14/2019 1:34:04 PM    Ht:  5 ft, 2.75 in;  Wt: 135.8 lbs;  BMI: 24.2    T: 98 F (oral);  BP: 124/54 mm Hg (left arm, sitting);  P: 65 bpm (left arm (BP Cuff), sitting);  sCr: 0.81 mg/dL;  GFR: 64.29        Exams:     PHYSICAL EXAM:     GENERAL: well developed, well nourished;  no apparent distress;     RESPIRATORY: normal respiratory rate and pattern with no distress; normal breath sounds with no rales, rhonchi, wheezes or rubs;     CARDIOVASCULAR: normal rate; rhythm is regular;     BREAST/INTEGUMENT: a rash is noted on the legs;  the color is mainly red;  it is best characterized as macular;     MUSCULOSKELETAL: normal gait;     NEUROLOGIC: mental status: alert and oriented x 3; GROSSLY INTACT     PSYCHIATRIC: appropriate affect and demeanor;         ASSESSMENT           782.1   R21  Rash              DDx:     V79.0   Z13.89  Screening for depression              DDx:         ORDERS:          Lab Orders:       99832  BDCB2 - [facility] CBC w/o diff  (Send-Out)         09139  COMP - [facility] Comp. Metabolic Panel  (Send-Out)         12082  BELLO - [facility] CM w/Reflex  (Send-Out)         22188  NUSCR - [facility] C-reactive protein CRP  (Send-Out)         88049  PTPTT - [facility] PT AND PTT  (Send-Out)           Other Orders:         Depression screen negative  (In-House)           Negative EtOH screen  (In-House)                   PLAN:          Rash Vasculitis appearing rash. Ordering labs. Consider dermatology referral. Has seen Samantha Shaikh in the past.     LABORATORY:  Labs ordered to be performed today include CM with Reflex, CBC W/O DIFF, Comprehensive metabolic panel, CRP, Quantitative, and PT and PTT.      FOLLOW-UP: Chronic visit follow up           Orders:       86693  BDCB2 - [facility] CBC w/o diff  (Send-Out)         90525  COMP - [facility] Comp. Metabolic Panel  (Send-Out)         70701  BELLO - [facility] CM w/Reflex  (Send-Out)         33871  NUSCR - [facility] C-reactive protein CRP  (Send-Out)         10225  PTPTT - [facility] PT AND PTT  (Send-Out)            Screening for depression     MIPS PHQ-9 Depression Screening: Completed form scanned and in chart; Total Score 0; Negative Depression Screen Negative alcohol screen           Orders:         Depression screen negative  (In-House)           Negative EtOH screen  (In-House)               CHARGE CAPTURE           **Please note: ICD descriptions below are intended for billing purposes only and may not represent clinical diagnoses**        Primary Diagnosis:         782.1 Rash            R21    Rash and other nonspecific skin eruption              Orders:          05434   Office/outpatient visit; established patient, level 3  (In-House)           V79.0 Screening for depression            Z13.89    Encounter for screening for other disorder              Orders:             Depression screen negative  (In-House)                Negative EtOH screen   (In-House)

## 2021-05-18 NOTE — PROGRESS NOTES
Yohana Casillas 1950     Office/Outpatient Visit    Visit Date: Mon, Sep 24, 2018 01:47 pm    Provider: Shanthi Caceres N.P. (Assistant: Annalee Schreiber MA)    Location: [facility]        Electronically signed by Shanthi Caceres N.P. on  09/24/2018 02:39:11 PM                             SUBJECTIVE:        CC:     Mrs. Casillas is a 67 year old White female.  This is a follow-up visit.  ( PATIENT IS NO LONGER ON PROTONIX ) said she got a letter from her insurance that she needed to follow up         HPI:         Mrs. Casillas presents with iDDM.  Current meds include insulin/injectable ( NovoLog ).  Most recent lab results include Hemoglobin A1c:  7.1 (%) (06/13/2018), LDL:  95 (mg/dL) (06/13/2018), HDL:  85 (mg/dL) (06/13/2018), Triglycerides:  58 (mg/dL) (06/13/2018), Microalbuminuria:  34.5 (mg/g creat) (11/25/2017), Dilated Eye Exam by date:  04/05/2018 (03/13/2018).  She sees JACKIE Perez.  She has an insulin pump.          Concerning acquired hypothyroidism, tSH was last checked 3 months ago.      ROS:     CONSTITUTIONAL:  Negative for chills, fatigue, fever, and weight change.      E/N/T:  Positive for tinnitus BILATERAL.  pulsation sensation in ears/has seen cardiologist and told him /intermittent issue     CARDIOVASCULAR:  Negative for chest pain, palpitations, tachycardia, orthopnea, and edema.      RESPIRATORY:  Negative for cough, dyspnea, and hemoptysis.      NEUROLOGICAL:  Negative for dizziness, headaches, paresthesias, and weakness.          PMH/FMH/SH:     Last Reviewed on 9/24/2018 02:36 PM by Shanthi Caceres    Past Medical History:                 PAST MEDICAL HISTORY         Hyperlipidemia: Hypercholesterolemia;     Hypertension     Type 1 Diabetes: controlled;     retinopathy, now being monitored, per Dr. Turner Hospitalizations: uti and drug reaction , at [facility]         CURRENT MEDICAL PROVIDERS:    Cardiologist: Reese    Urologist: dr sara Mccloud  Shahid NP/CDE         PREVENTIVE HEALTH MAINTENANCE             BONE DENSITY: was last done  osteopenia     COLORECTAL CANCER SCREENING: colonoscopy with normal results     Hepatitis C Medicare Screening: was last done      MAMMOGRAM: was last done 2016 with the following abnormalaties noted-- required more images on left breast     PAP SMEAR: hysterectomy         Surgical History:         Hysterectomy: Partial;      thyroidplasty 11      Thyroidectomy: small portion remains;      Bilateral Tubal Ligation    Bilateral Carpal Tunnel;    right index finger, trigger release and right wrist cyst removed 11; Procedures: colonoscopy      Cataract Removal: bilateral; 9&10- 2016;     Procedures:    Colonoscopy ( 14 )    EGD ( 14 )         Family History:         Positive for Hypertension ( brother ).      Positive for Lung Cancer ( father; mother ).      Positive for Seizure Disorder ( brother ).  Father:  at age 63; Cause of death was lung cancer     Mother:  at age 69; Cause of death was adrenal cancer;  Lung Cancer         Social History:     Occupation: [employer]     Marital Status:      Children: 2 children         Tobacco/Alcohol/Supplements:     Last Reviewed on 2018 01:51 PM by Annalee Schreiber    Tobacco: She has never smoked.  Non-drinker             Immunizations:     Fluzone (3 + years dose) 10/17/2011     Fluzone Quadrivalent (3+ years) 11/10/2016     Influenza A (H1N1) pf, IM (3+ years) Monovalent 2009     Fluzone High-Dose pf (>=65 yr) 2017     Pneumococcal, 23-valent IM/SC (adult and pt >=2yr) 2009     Adacel (Tdap) 2012     Zostavax (Zoster) 2013         Allergies:     Last Reviewed on 2018 01:50 PM by Annalee Schreiber    Aspirin: stomach pain (Adverse Reaction)    Sulfas:    Levaquin:        Current Medications:     Last Reviewed on 2018 01:50 PM by Annalee Schreiber    Lexapro 10mg Tablet 1 tab daily      Synthroid 0.075mg Tablet Take 1 tablet(s) by mouth daily--DO NOT SUBSTITUTE!!!!!     Simvastatin 20mg Tablet Take 1 tablet(s) by mouth daily     Fluticasone Propionate 50mcg/1actuation Nasal Spray 1 or 2 sprays in each nostril qday     Lisinopril 40mg Tablet Take 1 tablet(s) by mouth daily     Aspirin (ASA) 81mg Chewable Tablet Chew 1 tablet(s) by mouth qam     Hydrochlorothiazide (HCTZ) 25mg Tablet 1 tab daily     Premarin 0.625mg/1gm Vaginal Cream     Novalog insulin to carb ratio     Amlodipine  5mg Tablet         OBJECTIVE:        Vitals:         Current: 9/24/2018 1:52:58 PM    Ht:  5 ft, 2.75 in;  Wt: 130.5 lbs;  BMI: 23.3    T: 97.6 F (oral);  BP: 130/55 mm Hg (left arm, sitting);  P: 59 bpm (left arm (BP Cuff), sitting);  sCr: 0.7 mg/dL;  GFR: 74.11        Exams:     PHYSICAL EXAM:     GENERAL: vital signs recorded - well developed, well nourished;  no apparent distress;     E/N/T: EARS:  normal external auditory canals and tympanic membranes;  grossly normal hearing;     NECK: carotid exam reveals no bruits;     RESPIRATORY: normal respiratory rate and pattern with no distress; normal breath sounds with no rales, rhonchi, wheezes or rubs;     CARDIOVASCULAR: normal rate; rhythm is regular;  no systolic murmur;    Peripheral Pulses: posterior tibial: equal bilaterally;  no edema;     BREAST/INTEGUMENT: skin of feet and toenails intact;     NEUROLOGIC: sensation intact to monofilament bilaterally;     PSYCHIATRIC:  appropriate affect and demeanor; normal speech pattern; grossly normal memory;         ASSESSMENT           250.01   E10.9  IDDM              DDx:     244.8   E01.8   E03.8  Acquired hypothyroidism              DDx:     388.31   H93.13  Tinnitus              DDx:         ORDERS:         Lab Orders:       FUTURE  Future order to be done at patients convenience  (Send-Out)         79473  DIAB - [facility] CMP A1C LIPID AND MICRO ALBUM CR RATIO: [id number]  (Send-Out)         FUTURE   Future order to be done at patients convenience  (Send-Out)         00898  TSH - [facility] TSH  (Send-Out)                   PLAN:          IDDM reviewed her last labs, to get fasting labs next month before her next appt with Ame; updated her diabetes flow sheet /will get her flu vaccine next month         FOLLOW-UP TESTING #1: FOLLOW-UP LABORATORY:  Labs to be scheduled in the future include Diabetes Panel 2;CMP, A1C, Lipid, Microalbumin:Creatinine Ratio.            Orders:       FUTURE  Future order to be done at patients convenience  (Send-Out)         97359  DIAB2 - [facility] CMP A1C LIPID AND MICRO ALBUM CR RATIO: [id number]  (Send-Out)             Patient Education Handouts:       Hepatitis A           Acquired hypothyroidism         FOLLOW-UP TESTING #1: FOLLOW-UP LABORATORY:  Labs to be scheduled in the future include TSH.      FOLLOW-UP: for labs next month           Orders:       FUTURE  Future order to be done at patients convenience  (Send-Out)         79793  TSH - [facility] TSH  (Send-Out)            Tinnitus if symptoms persist, will let me know and will send to ENT             Patient Recommendations:        For  IDDM:             The following laboratory testing has been ordered:         For  Acquired hypothyroidism:             The following laboratory testing has been ordered: TSH             CHARGE CAPTURE           **Please note: ICD descriptions below are intended for billing purposes only and may not represent clinical diagnoses**        Primary Diagnosis:         250.01 IDDM            E10.9    Type 1 diabetes mellitus without complications              Orders:          93586   Office/outpatient visit; established patient, level 4  (In-House)           244.8 Acquired hypothyroidism            E01.8    Other iodine-deficiency related thyroid disorders and allied conditions           E03.8    Other specified hypothyroidism    388.31 Tinnitus            H93.13    Tinnitus, bilateral

## 2021-05-18 NOTE — PROGRESS NOTES
Yohana Casillas Roxy  1950     Office/Outpatient Visit    Visit Date: Thu, May 21, 2020 03:41 pm    Provider: Shanthi Caceres N.P. (Assistant: Lissette Hidalgo MA)    Location: [facility]        Electronically signed by Shanthi Caceres N.P. on  05/21/2020 04:09:44 PM                             Subjective:        CC: [phone] - dox video Ms. Casillas is a 69 year old White female.  This is a follow-up visit.  labs;         HPI:           Today's encounter is being done with a telehealth visit. She has consented verbally with two witnesses for todays treatment. Todays visit is being conducted by audio and video. Individuals present during the telemedicine consultation include Yohana, patient, JACKIE Reid Dx with other iodine-deficiency related thyroid disorders and allied conditions; she is currently taking Levothyroid, 75 mcg daily.  TSH was last checked <1 months ago.  The result was reported as high.  did miss one dose of rx           Mixed hyperlipidemia details; current treatment includes a low cholesterol/low fat diet.  Most recent lab tests include Glucose, Serum:  154 (mg/dL) (05/14/2020), ALT (SGPT):  13 (U/L) (05/14/2020), AST (SGOT):  20 (U/L) (05/14/2020), Hemoglobin A1c:  7.8 (%) (05/05/2020), Total Cholesterol:  240 (mg/dL) (05/14/2020), HDL:  82 (mg/dL) (05/14/2020), Triglycerides:  64 (mg/dL) (05/14/2020), LDL:  145 (mg/dL) (05/14/2020).  Stopped zocor due to some pains, but that did not seem to make a difference.  Concurrent health problems include diabetes.      ROS:     CONSTITUTIONAL:  Negative for fever.      CARDIOVASCULAR:  Negative for chest pain, palpitations, tachycardia, orthopnea, and edema.      RESPIRATORY:  Negative for recent cough and dyspnea.      NEUROLOGICAL:  Negative for dizziness, headaches, paresthesias, and weakness.          Past Medical History / Family History / Social History:         Last Reviewed on 5/21/2020 03:59 PM by Shanthi Caceres  D    Past Medical History:                 PAST MEDICAL HISTORY         Hyperlipidemia: Hypercholesterolemia;     Hypertension     Type 1 Diabetes: controlled;     retinopathy, now being monitored, per Dr. Turner Hospitalizations: uti and drug reaction , at [facility]         CURRENT MEDICAL PROVIDERS:    Cardiologist: Reese    Urologist: dr sara Key NP/CDE         PREVENTIVE HEALTH MAINTENANCE             BONE DENSITY: was last done  osteopenia     COLORECTAL CANCER SCREENING: colonoscopy with normal results     Hepatitis C Medicare Screening: was last done      MAMMOGRAM: was last done 2016 with the following abnormalaties noted-- required more images on left breast     PAP SMEAR: hysterectomy         Surgical History:         Hysterectomy: Partial;     thyroidplasty 11     Thyroidectomy: small portion remains;     Bilateral Tubal Ligation    Bilateral Carpal Tunnel;    right index finger, trigger release and right wrist cyst removed 11; Procedures: colonoscopy      Cataract Removal: bilateral; 9&10- 2016;     Cholecystectomy: laparoscopic; 19;     Procedures:    Colonoscopy ( 14 )    EGD ( 14 ) left vocal cord surgery 19         Family History:         Positive for Hypertension ( brother ).      Positive for Lung Cancer ( father; mother ).      Positive for Seizure Disorder ( brother ).  Father:  at age 63; Cause of death was lung cancer     Mother:  at age 69; Cause of death was adrenal cancer;  Lung Cancer         Social History:     Occupation: [employer] 3-4 days a week     Marital Status:      Children: 2 children         Tobacco/Alcohol/Supplements:     Last Reviewed on 2020 10:35 AM by Nacho Ha    Tobacco: She has never smoked.  Non-drinker         Immunizations:     Fluzone (3 + years dose) 10/17/2011    Fluzone Quadrivalent (3+ years) 11/10/2016    Influenza A (H1N1) pf, IM (3+ years)  Monovalent 11/18/2009    Fluzone High-Dose pf (>=65 yr) 11/14/2017    Fluzone High-Dose pf (>=65 yr) 10/23/2019    Pneumococcal, 23-valent IM/SC (adult and pt >=2yr) 5/26/2009    Adacel (Tdap) 7/11/2012    Zostavax (Zoster live) 9/4/2013        Allergies:     Last Reviewed on 5/21/2020 03:42 PM by Lissette Hidalgo    Aspirin: Stomach pain  (Adverse Reaction)    Sulfas:      Levaquin:          Current Medications:     Last Reviewed on 5/21/2020 03:41 PM by Lissette Hidalgo    Simvastatin 20 mg oral tablet [Take 1 tablet(s) by mouth daily]    Lisinopril 40mg Tablet [Take 1 tablet(s) by mouth daily]    levothyroxine 75 mcg oral tablet [TAKE ONE TABLET BY MOUTH DAILY]    aspirin 81 mg oral tablet,chewable [Chew 1 tablet(s) by mouth qam]    amLODIPine 10 mg oral tablet [1 / 2 tab daily]    Novalog insulin to carb ratio     Fluticasone Propionate 50mcg/1actuation Nasal Spray [1 or 2 sprays in each nostril qday ]    Premarin 0.625 mg/gram Vaginal Cream    hydroCHLOROthiazide 25 mg oral tablet [1 tab daily]    Lexapro 10 mg oral tablet [take 1 tablet (10 mg) by oral route once daily]        Objective:        Vitals:         Current: 5/21/2020 4:09:02 PM    Ht:  5 ft, 2.75 inT: 96.6 F (oral);  BP: 120/60 mm Hg (left arm, sitting);  sCr: 0.81 mg/dL;  GFR: 67.11        Exams:     PHYSICAL EXAM:     GENERAL: vital signs recorded - well developed, well nourished;  no apparent distress;     RESPIRATORY: normal appearance and symmetric expansion of chest wall;     NEUROLOGIC: GROSSLY INTACT     PSYCHIATRIC:  appropriate affect and demeanor; normal speech pattern; grossly normal memory;         Assessment:         E01.8   Other iodine-deficiency related thyroid disorders and allied conditions       E78.2   Mixed hyperlipidemia           ORDERS:         Meds Prescribed:       [New Rx] Crestor 5 mg oral tablet [take 1 tablet (5 mg) by oral route once daily], #30 (thirty) tablets, Refills: 1 (one)         Lab Orders:       FUTURE   Future order to be done at patients convenience  (Send-Out)            30606  [facility] TSH  (Send-Out)            FUTURE  Future order to be done at patients convenience  (Send-Out)            13559  [facility] Comp. Metabolic Panel  (Send-Out)            00534  [facility] Lipid Panel  (Send-Out)                      Plan:         Other iodine-deficiency related thyroid disorders and allied conditionstake rx daily on empty stomach, reviewed labs, recheck lab in 5 weeks     Telehealth: Verbal consent obtained for visit to occur via televideo conferencing     FOLLOW-UP TESTING #1: FOLLOW-UP LABORATORY:  Labs to be scheduled in the future include TSH.      RECOMMENDATIONS given include: take rx daily on empty stomach.      FOLLOW-UP: Schedule a follow-up appointment in 5 weeks.            Orders:       FUTURE  Future order to be done at patients convenience  (Send-Out)            34666  [facility] TSH  (Send-Out)              Mixed hyperlipidemiadiscussed labs, risk vs benefit to rx, will switch to crestor daily, low dose, continue her efforts with  diet, and has been walking 2-3 miles daily, sees her kamryn NP next month / glucose today 136        FOLLOW-UP TESTING #1: FOLLOW-UP LABORATORY:  Labs to be scheduled in the future include CMP and lipid panel.      RECOMMENDATIONS given include: low cholesterol/low fat diet.      FOLLOW-UP: fasting for labs in 5 weeks, mailed lab order to pt           Prescriptions:       [New Rx] Crestor 5 mg oral tablet [take 1 tablet (5 mg) by oral route once daily], #30 (thirty) tablets, Refills: 1 (one)           Orders:       FUTURE  Future order to be done at patients convenience  (Send-Out)            01425  [facility] Comp. Metabolic Panel  (Send-Out)            11768  [facility] Lipid Panel  (Send-Out)                  Patient Recommendations:        For  Other iodine-deficiency related thyroid disorders and allied conditions:            The following laboratory testing has been  ordered: TSH Schedule a follow-up visit in 5 weeks.          For  Mixed hyperlipidemia:            The following laboratory testing has been ordered: metabolic panel, comprehensive lipid panel Reduce the amount of cholesterol and saturated fat in your diet.              Charge Capture:         Primary Diagnosis:     E01.8  Other iodine-deficiency related thyroid disorders and allied conditions           Orders:      02051  Office/outpatient visit; established patient, level 3  (In-House)              E78.2  Mixed hyperlipidemia

## 2021-05-18 NOTE — PROGRESS NOTES
Yohana Casillas 1950     Office/Outpatient Visit    Visit Date: Fri, Nov 16, 2018 02:36 pm    Provider: Harper Hua N.P. (Assistant: Jade Farrell MA)    Location: [facility]        Electronically signed by Harper Hua N.P. on  11/16/2018 03:13:20 PM                             SUBJECTIVE:        CC:     Mrs. Casillas is a 68 year old White female.  presents today due to complaints of pain under right breast X 3 days         HPI:         Patient to be evaluated for abdominal pain, other specified site.  This is located primarily in the right upper quadrant.  It does not radiate.  It began 3 days ago.  She characterizes it as aching and dull.  She estimates that the frequency of pain is constant.  There are no obvious aggravating factors.  The pain is relieved with belching.  Associated symptoms include constipation.  She denies diarrhea, nausea or vomiting.  Was previously on Protonix for GERD but stopped several months ago. History of H. pylori. Had upper GI after and told that was all clear. This was about 3 years ago. History of IDDM. HgbA1C 7.4 last month.     ROS:     CONSTITUTIONAL:  Negative for chills and fever.      CARDIOVASCULAR:  Negative for chest pain and palpitations.      RESPIRATORY:  Negative for dyspnea and frequent wheezing.      GASTROINTESTINAL:  Positive for abdominal pain ( RUQ ) and constipation.   Negative for diarrhea, nausea or vomiting.      GENITOURINARY:  Negative for dysuria and frequent urination.          PM/[facility]/SH:     Last Reviewed on 9/24/2018 02:36 PM by Shanthi Caceres    Past Medical History:                 PAST MEDICAL HISTORY         Hyperlipidemia: Hypercholesterolemia;     Hypertension     Type 1 Diabetes: controlled;     retinopathy, now being monitored, per Dr. Turner Hospitalizations: uti and drug reaction , at [facility]         CURRENT MEDICAL PROVIDERS:    Cardiologist: Reese    Urologist: dr sara Key NP/CDE          PREVENTIVE HEALTH MAINTENANCE             BONE DENSITY: was last done  osteopenia     COLORECTAL CANCER SCREENING: colonoscopy with normal results     Hepatitis C Medicare Screening: was last done      MAMMOGRAM: was last done 2016 with the following abnormalaties noted-- required more images on left breast     PAP SMEAR: hysterectomy         Surgical History:         Hysterectomy: Partial;      thyroidplasty 11      Thyroidectomy: small portion remains;      Bilateral Tubal Ligation    Bilateral Carpal Tunnel;    right index finger, trigger release and right wrist cyst removed 11; Procedures: colonoscopy      Cataract Removal: bilateral; 9&10- 2016;     Procedures:    Colonoscopy ( 14 )    EGD ( 14 )         Family History:         Positive for Hypertension ( brother ).      Positive for Lung Cancer ( father; mother ).      Positive for Seizure Disorder ( brother ).  Father:  at age 63; Cause of death was lung cancer     Mother:  at age 69; Cause of death was adrenal cancer;  Lung Cancer         Social History:     Occupation: [employer]     Marital Status:      Children: 2 children         Tobacco/Alcohol/Supplements:     Last Reviewed on 2018 01:51 PM by Annalee Schreiber    Tobacco: She has never smoked.  Non-drinker         Substance Abuse History:     Last Reviewed on 2016 02:59 PM by Shantell Garrison    NEGATIVE         Mental Health History:     Last Reviewed on 2016 02:59 PM by Shantell Garrison        Communicable Diseases (eg STDs):     Last Reviewed on 2016 02:59 PM by Shantell Garrison            Current Problems:     Last Reviewed on 2016 02:59 PM by Shantell Garrison    Anxiety     Fatigue     Postmenopausal atrophic vaginitis     Atrophic vaginitis-Recurrent UTI's     Hip pain     Leukocytopenia, unspecified     IDDM     Acquired hypothyroidism     Essential hypertension, benign     Mixed hyperlipidemia      Unspecified PVD     GERD     Tinnitus     Urinary tract infection     UTI     Dysuria         Immunizations:     Fluzone (3 + years dose) 10/17/2011     Fluzone Quadrivalent (3+ years) 11/10/2016     Influenza A (H1N1) pf, IM (3+ years) Monovalent 11/18/2009     Fluzone High-Dose pf (>=65 yr) 11/14/2017     Pneumococcal, 23-valent IM/SC (adult and pt >=2yr) 5/26/2009     Adacel (Tdap) 7/11/2012     Zostavax (Zoster live) 9/4/2013         Allergies:     Last Reviewed on 9/24/2018 01:50 PM by Annalee Schreiber    Aspirin: stomach pain (Adverse Reaction)    Sulfas:    Levaquin:        Current Medications:     Last Reviewed on 9/24/2018 01:50 PM by Annalee Schreiber    Lexapro 10mg Tablet 1 tab daily     Synthroid 0.075mg Tablet Take 1 tablet(s) by mouth daily--DO NOT SUBSTITUTE!!!!!     Simvastatin 20mg Tablet Take 1 tablet(s) by mouth daily     Fluticasone Propionate 50mcg/1actuation Nasal Spray 1 or 2 sprays in each nostril qday     Lisinopril 40mg Tablet Take 1 tablet(s) by mouth daily     Aspirin (ASA) 81mg Chewable Tablet Chew 1 tablet(s) by mouth qam     Hydrochlorothiazide (HCTZ) 25mg Tablet 1 tab daily     Premarin 0.625mg/1gm Vaginal Cream     Novalog insulin to carb ratio     Amlodipine  5mg Tablet         OBJECTIVE:        Vitals:         Current: 11/16/2018 2:41:13 PM    Ht:  5 ft, 2.75 in;  Wt: 132.8 lbs;  BMI: 23.7    T: 97.9 F (oral);  BP: 131/73 mm Hg (left arm, sitting);  P: 63 bpm (left arm (BP Cuff), sitting);  sCr: 0.77 mg/dL;  GFR: 67.00        Exams:     PHYSICAL EXAM:     GENERAL: well developed, well nourished;  no apparent distress;     RESPIRATORY: normal respiratory rate and pattern with no distress; normal breath sounds with no rales, rhonchi, wheezes or rubs;     CARDIOVASCULAR: normal rate; rhythm is regular;     GASTROINTESTINAL: mild RUQ tenderness;  normal bowel sounds; no organomegaly;     MUSCULOSKELETAL: normal gait;     NEUROLOGIC: mental status: alert and oriented x 3; GROSSLY INTACT      PSYCHIATRIC: appropriate affect and demeanor;         ASSESSMENT           789.01   R10.11  RUQ abdominal pain              DDx:         ORDERS:         Lab Orders:       [id number]  AMYS - [facility] Amylase, Serum  (Send-Out)         33161  BDCB2 - [facility] CBC w/o diff  (Send-Out)         80289  COMP - [facility] Comp. Metabolic Panel  (Send-Out)         69789  HPUBT - [facility] H.pylori Breath test  (Send-Out)         93447  LIP - [facility] Lipase, Serum  (Send-Out)                   PLAN:          RUQ abdominal pain Discussed differential including GERD, gallbladder disease, pancreatitis. Await labs. Consider RUQ US. Advised to seek ER care immediately for worsening symptoms such as severe abdominal pain, coffee ground emesis, black tarry stools. May take Protonix once daily as previously prescribed for the next 14 days.     LABORATORY:  Labs ordered to be performed today include amylase, CBC W/O DIFF, Comprehensive metabolic panel, H. pylori breath test, and Lipase.      RECOMMENDATIONS given include: Further recommendation to be given after test results are complete.      FOLLOW-UP: Schedule follow-up appointments on a p.r.n. basis. for after testing Chronic visit follow up           Orders:       [id number]  AMYS - [facility] Amylase, Serum  (Send-Out)         85359  BDCB2 - [facility] CBC w/o diff  (Send-Out)         77678  COMP - [facility] Comp. Metabolic Panel  (Send-Out)         99266  HPUBT - [facility] H.pylori Breath test  (Send-Out)         87037  LIP - [facility] Lipase, Serum  (Send-Out)               Patient Recommendations:        For  RUQ abdominal pain:     Schedule follow-up appointments as needed.              CHARGE CAPTURE           **Please note: ICD descriptions below are intended for billing purposes only and may not represent clinical diagnoses**        Primary Diagnosis:         789.01 RUQ abdominal pain            R10.11    Right upper quadrant pain              Orders:          [id number]   Office/outpatient visit; established patient, level 3  (In-House)                ADDENDUMS:      ____________________________________    Addendum: 11/29/2018 02:54 PM - Merary Carrizales         Visit Note Faxed to:        Rishi Cruz  (General Surgery); Number [phone]

## 2021-05-18 NOTE — PROGRESS NOTES
Yohnaa CasillasValery 1950     Office/Outpatient Visit    Visit Date: Mon, Jan 29, 2018 01:23 pm    Provider: Shanthi Caceres N.P. (Assistant: Gracie Jurado RN)    Location: [facility]        Electronically signed by Shanthi Caceres N.P. on  01/29/2018 03:46:22 PM                             SUBJECTIVE:        CC:     Mrs. Casillas is a 67 year old White female.  Bilateral ankle edema;         HPI:         Patient complains of pedal edema.  This has been present for >2 weeks.  The swelling has primarily involved the lower extremities.  The degree of edema has been getting worse recently.  She denies associated calf pain and extremity pain.  started back to work part time and is on her feet (recently had her norvasc dose increased)     ROS:     CONSTITUTIONAL:  Negative for chills, fatigue, fever, and weight change.      CARDIOVASCULAR:  Negative for chest pain.      RESPIRATORY:  Positive for dyspnea ( with moderate exertion ).      NEUROLOGICAL:  Negative for dizziness, headaches, paresthesias, and weakness.          [facility]/:     Last Reviewed on 1/29/2018 01:36 PM by Shanthi Caceres    Past Medical History:                 PAST MEDICAL HISTORY         Hyperlipidemia: Hypercholesterolemia;     Hypertension     Type 1 Diabetes: controlled;     retinopathy, now being monitored, per Dr. Turner Hospitalizations: uti and drug reaction , at [facility]         CURRENT MEDICAL PROVIDERS:    Cardiologist: Reese    Urologist: dr sara Key NP/CDE    Dr Humphrey endocrinologist         PREVENTIVE HEALTH MAINTENANCE             BONE DENSITY: was last done 2010 osteopenia     COLORECTAL CANCER SCREENING: colonoscopy with normal results     Hepatitis C Medicare Screening: was last done      MAMMOGRAM: was last done 9/2016 with the following abnormalaties noted-- required more images on left breast     PAP SMEAR: hysterectomy         Surgical History:         Hysterectomy: Partial;       thyroidplasty 11      Thyroidectomy: small portion remains;      Bilateral Tubal Ligation    Bilateral Carpal Tunnel;    right index finger, trigger release and right wrist cyst removed 11; Procedures: colonoscopy      Cataract Removal: bilateral; 9&10- 2016;     Procedures:    Colonoscopy ( 14 )    EGD ( 14 )         Family History:         Positive for Hypertension ( brother ).      Positive for Lung Cancer ( father; mother ).      Positive for Seizure Disorder ( brother ).  Father:  at age 63; Cause of death was lung cancer     Mother:  at age 69; Cause of death was adrenal cancer;  Lung Cancer         Social History:     Occupation: [employer]     Marital Status:      Children: 2 children         Tobacco/Alcohol/Supplements:     Last Reviewed on 2018 01:25 PM by Gracie Jurado    Tobacco: She has never smoked.  Non-drinker             Immunizations:     Fluzone (3 + years dose) 10/17/2011     Fluzone Quadrivalent (3+ years) 11/10/2016     Influenza A (H1N1) pf, IM (3+ years) Monovalent 2009     Fluzone High-Dose pf (>=65 yr) 2017     Pneumococcal, 23-valent IM/SC (adult and pt >=2yr) 2009     Adacel (Tdap) 2012     Zostavax (Zoster) 2013         Allergies:     Last Reviewed on 2018 01:25 PM by Gracie Jurado    Aspirin: stomach pain (Adverse Reaction)    Sulfas:    Levaquin:        Current Medications:     Last Reviewed on 2018 01:26 PM by Gracie Jurado    Lexapro 10mg Tablet 1 tab daily     Synthroid 0.075mg Tablet Take 1 tablet(s) by mouth daily--DO NOT SUBSTITUTE!!!!!     Fluticasone Propionate 50mcg/1actuation Nasal Spray 1 or 2 sprays in each nostril qday     Simvastatin 20mg Tablet Take 1 tablet(s) by mouth daily     Lisinopril 40mg Tablet Take 1 tablet(s) by mouth daily     Aspirin (ASA) 81mg Chewable Tablet Chew 1 tablet(s) by mouth qam     Premarin 0.625mg/1gm Vaginal Cream     Protonix 40mg Tablets,  Delayed Release 1 tab daily     Novalog insulin to carb ratio     Amlodipine  10mg Tablet 1 tab daily         OBJECTIVE:        Vitals:         Current: 1/29/2018 1:25:12 PM    Ht:  5 ft, 2.75 in;  Wt: 131.6 lbs;  BMI: 23.5    T: 97 F (oral);  BP: 132/62 mm Hg (left arm, sitting);  P: 61 bpm (left arm (BP Cuff), sitting);  sCr: 0.81 mg/dL;  GFR: 64.27        Exams:     PHYSICAL EXAM:     GENERAL: vital signs recorded - well developed, well nourished;  no apparent distress;     NECK: carotid exam reveals no bruits;     RESPIRATORY: normal respiratory rate and pattern with no distress; normal breath sounds with no rales, rhonchi, wheezes or rubs;     CARDIOVASCULAR: normal rate; rhythm is regular;  no systolic murmur; no edema;     PSYCHIATRIC:  appropriate affect and demeanor; normal speech pattern; grossly normal memory;         ASSESSMENT           782.3   R60.9  Pedal edema              DDx:         PLAN:          Pedal edema due to recent increase in her norvasc, will have her contact her cardiologist, wear her compression hose and continue to limit sodium intake         RECOMMENDATIONS given include: limitation of excessive use of the swollen extremity, elevation of the legs as much as possible, restriction of sodium intake, and use of support hose.      FOLLOW-UP: Advised to call if there is no improvement in 2 week(s).            Orders: compression hose order given to patient           Patient Education Handouts:       DASH Diet              Patient Recommendations:        For  Pedal edema:     Limit excessive use of the swollen extremity. Elevate the swollen extremity as much as possible to reduce swelling. Restrict the use of salt in your diet. Begin wearing support hose/stockings to improve the circulation in your legs and decrease swelling.              CHARGE CAPTURE           **Please note: ICD descriptions below are intended for billing purposes only and may not represent clinical diagnoses**         Primary Diagnosis:         782.3 Pedal edema            R60.9    Edema, unspecified              Orders:          [id number]   Office/outpatient visit; established patient, level 3  (In-House)

## 2021-05-18 NOTE — PROGRESS NOTES
oYhana CasillasValery 1950     Office/Outpatient Visit    Visit Date: Thu, May 24, 2018 04:17 pm    Provider: Nadia Whittington N.P. (Assistant: Yajaira Jovel MA)    Location: [facility]        Electronically signed by Nadia Wihttington N.P. on  05/24/2018 04:57:38 PM                             SUBJECTIVE:        CC:     Mrs. Casillas is a 67 year old White female.  patient is here for blood in urine;         HPI:         Patient complains of burning with urination.  This began within the last 2 days.  Associated symptoms include hematuria and urinary frequency.  She denies associated abdominal pain, fever, flank pain, nausea or vomiting.  Mrs. Casillas states that she had multiple prior episodes of similar discomfort, which were diagnosed as simple UTI.  Treatments tried thus far include azo.      ROS:     CONSTITUTIONAL:  Positive for fatigue.   Negative for fever.      E/N/T:  Negative for hearing problems, E/N/T pain, congestion, rhinorrhea, epistaxis, hoarseness, and dental problems.      CARDIOVASCULAR:  Negative for chest pain, palpitations, tachycardia, orthopnea, and edema.      RESPIRATORY:  Negative for cough, dyspnea, and hemoptysis.      GASTROINTESTINAL:  Negative for abdominal pain, heartburn, constipation, diarrhea, and stool changes.      GENITOURINARY:  Positive for dysuria and hematuria.   Negative for vaginal discharge.      MUSCULOSKELETAL:  Negative for arthralgias, back pain, and myalgias.      ENDOCRINE:  Negative for hair loss, heat/cold intolerance, polydipsia, and polyphagia.          PMH/FM/SH:     Last Reviewed on 3/13/2018 12:03 PM by Shanthi Caceres    Past Medical History:                 PAST MEDICAL HISTORY         Hyperlipidemia: Hypercholesterolemia;     Hypertension     Type 1 Diabetes: controlled;     retinopathy, now being monitored, per Dr. Turner Hospitalizations: uti and drug reaction , at [facility]         CURRENT MEDICAL PROVIDERS:    Cardiologist:  Reese    Urologist: dr sara Key NP/CDE         PREVENTIVE HEALTH MAINTENANCE             BONE DENSITY: was last done  osteopenia     COLORECTAL CANCER SCREENING: colonoscopy with normal results     Hepatitis C Medicare Screening: was last done      MAMMOGRAM: was last done 2016 with the following abnormalaties noted-- required more images on left breast     PAP SMEAR: hysterectomy         Surgical History:         Hysterectomy: Partial;      thyroidplasty 11      Thyroidectomy: small portion remains;      Bilateral Tubal Ligation    Bilateral Carpal Tunnel;    right index finger, trigger release and right wrist cyst removed 11; Procedures: colonoscopy      Cataract Removal: bilateral; 9&10- 2016;     Procedures:    Colonoscopy ( 14 )    EGD ( 14 )         Family History:         Positive for Hypertension ( brother ).      Positive for Lung Cancer ( father; mother ).      Positive for Seizure Disorder ( brother ).  Father:  at age 63; Cause of death was lung cancer     Mother:  at age 69; Cause of death was adrenal cancer;  Lung Cancer         Social History:     Occupation: [employer]     Marital Status:      Children: 2 children         Tobacco/Alcohol/Supplements:     Last Reviewed on 3/13/2018 11:37 AM by Kelly Caceres    Tobacco: She has never smoked.  Non-drinker         Substance Abuse History:     Last Reviewed on 2016 02:59 PM by Shantell Garrison    NEGATIVE         Mental Health History:     Last Reviewed on 2016 02:59 PM by Shantell Garrison        Communicable Diseases (eg STDs):     Last Reviewed on 2016 02:59 PM by Shantell Garrison            Current Problems:     Last Reviewed on 2016 02:59 PM by Shantell Garrison    Anxiety     Fatigue     Postmenopausal atrophic vaginitis     Atrophic vaginitis-Recurrent UTI's     Hip pain     Leukocytopenia, unspecified     IDDM     Acquired  hypothyroidism     Essential hypertension, benign     Mixed hyperlipidemia     Unspecified PVD     GERD     Urinary tract infection     UTI     Dysuria         Immunizations:     Fluzone (3 + years dose) 10/17/2011     Fluzone Quadrivalent (3+ years) 11/10/2016     Influenza A (H1N1) pf, IM (3+ years) Monovalent 11/18/2009     Fluzone High-Dose pf (>=65 yr) 11/14/2017     Pneumococcal, 23-valent IM/SC (adult and pt >=2yr) 5/26/2009     Adacel (Tdap) 7/11/2012     Zostavax (Zoster) 9/4/2013         Allergies:     Last Reviewed on 3/13/2018 11:35 AM by Kelly Caceres    Aspirin: stomach pain (Adverse Reaction)    Sulfas:    Levaquin:        Current Medications:     Last Reviewed on 3/13/2018 11:33 AM by Kelyl Caceres    Fluticasone Propionate 50mcg/1actuation Nasal Spray 1 or 2 sprays in each nostril qday     Lexapro 10mg Tablet 1 tab daily     Simvastatin 20mg Tablet Take 1 tablet(s) by mouth daily     Synthroid 0.075mg Tablet Take 1 tablet(s) by mouth daily--DO NOT SUBSTITUTE!!!!!     Lisinopril 40mg Tablet Take 1 tablet(s) by mouth daily     Aspirin (ASA) 81mg Chewable Tablet Chew 1 tablet(s) by mouth qam     Hydrochlorothiazide (HCTZ) 25mg Tablet 1 tab daily     Premarin 0.625mg/1gm Vaginal Cream     Protonix 40mg Tablets, Delayed Release 1 tab daily     Novalog insulin to carb ratio     Amlodipine  10mg Tablet 1 tab daily         OBJECTIVE:        Vitals:         Historical:     03/13/2018  BP:   118/72 mm Hg ( (left arm, , sitting, );)     03/13/2018  Wt:   131.2lbs        Current: 5/24/2018 4:19:14 PM    Ht:  5 ft, 2.75 in;  Wt: 132 lbs;  BMI: 23.6    T: 96.5 F (oral);  BP: 131/69 mm Hg (left arm, sitting);  P: 59 bpm (left arm (BP Cuff), sitting);  sCr: 0.81 mg/dL;  GFR: 64.35        Exams:     PHYSICAL EXAM:     GENERAL:  well developed and nourished; appropriately groomed; in no apparent distress;     RESPIRATORY: normal respiratory rate and pattern with no distress; normal breath sounds with no rales,  "rhonchi, wheezes or rubs;     CARDIOVASCULAR: normal rate; rhythm is regular;     MUSCULOSKELETAL:  Normal range of motion, strength and tone;     NEUROLOGIC: mental status: alert and oriented x 3; GROSSLY INTACT     PSYCHIATRIC:  appropriate affect and demeanor; normal speech pattern; grossly normal memory;         Lab/Test Results:             Glucose, Urine:  500 mg/dL (05/24/2018),     Bilirubin, urine:  Negative (05/24/2018),     Ketones, Urine Strip:  Negative (05/24/2018),     Specific Gravity, urine:  1.025 (05/24/2018),     Blood in Urine:  small (05/24/2018),     pH, urine:  5.5 (05/24/2018),     Protein Urine QL:  negative (05/24/2018),     Urobilinogen, urine:  0.2 E.U./dL (05/24/2018),     Nitrite, Urine:  Negative (05/24/2018),     Leukoctyes, urine:  Negative (05/24/2018),     Appearance:  Slightly Cloudy (05/24/2018),     collection source:  Clean-catch (05/24/2018),     Color:  Yellow (05/24/2018),     Performed by::  genet (05/24/2018),             ASSESSMENT           788.1   R30.0  Burning with urination              DDx:         ORDERS:         Meds Prescribed:       Macrobid (Nitrofurantoin) 100mg Capsules one BID x 5 days  #10 (Ten) capsule(s) Refills: 0         Lab Orders:       [id number]  Urinalysis, automated, without microscopy  (In-House)         [id number]  [facility] - [facility] Urine Culture  (Send-Out)                   PLAN:          Burning with urination         RECOMMENDATIONS given include: increase oral fluid intake, wipe front-to-back after urinating, avoid \"holding\" urine, urinate after intercourse, and feels like her typical UTI.  last episode aug 2016.            Prescriptions:       Macrobid (Nitrofurantoin) 100mg Capsules one BID x 5 days  #10 (Ten) capsule(s) Refills: 0           Orders:       [id number]  Urinalysis, automated, without microscopy  (In-House)         [id number]  [facility] - [facility] Urine Culture  (Send-Out)               Patient Recommendations:        For  Burning with urination:     Increase " "your intake of oral fluids. Wipe front-to-back with toilet paper after urinating to help prevent future infections. Avoid \"holding\" your urine; empty your bladder when you first feel the urge to urinate. Urinate immediately after intercourse to help avoid future bladder infections.              CHARGE CAPTURE           **Please note: ICD descriptions below are intended for billing purposes only and may not represent clinical diagnoses**        Primary Diagnosis:         788.1 Burning with urination            R30.0    Dysuria              Orders:          [id number]   Office/outpatient visit; established patient, level 3  (In-House)             [id number]   Urinalysis, automated, without microscopy  (In-House)                  "

## 2021-05-18 NOTE — PROGRESS NOTES
Yohana Casillas 1950     Office/Outpatient Visit    Visit Date: Mon, Mar 4, 2019 01:50 pm    Provider: Iris Walker N.P. (Assistant: Sarah Spurling, MA)    Location: [facility]        Electronically signed by Iris Walker N.P. on  03/07/2019 12:03:43 AM                             SUBJECTIVE:        CC:     Mrs. Casillas is a 68 year old White female.  UTI symptoms.;         HPI:         Patient to be evaluated for urinary pain.  This began yesterday.  Associated symptoms include hesitancy, urgency and urinary frequency.  She denies associated chills, fever, flank pain or hematuria.  Mrs. Casillas states that she had multiple prior episodes of similar discomfort.  Treatments tried thus far include pyridium.  Treatment effectivenss has been generally ineffective.      ROS:     CONSTITUTIONAL:  Negative for chills, fatigue, fever, and weight change.      CARDIOVASCULAR:  Negative for chest pain, orthopnea, paroxysmal nocturnal dyspnea and pedal edema.      RESPIRATORY:  Negative for dyspnea.      GASTROINTESTINAL:  Negative for abdominal pain, constipation, diarrhea, nausea and vomiting.      GENITOURINARY:  Positive for dysuria and frequent urination.      MUSCULOSKELETAL:  Negative for back pain.          PMH/FMH/SH:     Last Reviewed on 9/24/2018 02:36 PM by Shanthi aCceres    Past Medical History:                 PAST MEDICAL HISTORY         Hyperlipidemia: Hypercholesterolemia;     Hypertension     Type 1 Diabetes: controlled;     retinopathy, now being monitored, per Dr. Turner Hospitalizations: uti and drug reaction , at [facility]         CURRENT MEDICAL PROVIDERS:    Cardiologist: Reese    Urologist: dr sara Key NP/CDE         PREVENTIVE HEALTH MAINTENANCE             BONE DENSITY: was last done 2010 osteopenia     COLORECTAL CANCER SCREENING: colonoscopy with normal results     Hepatitis C Medicare Screening: was last done      MAMMOGRAM: was last done  2016 with the following abnormalaties noted-- required more images on left breast     PAP SMEAR: hysterectomy         Surgical History:         Hysterectomy: Partial;      thyroidplasty 11      Thyroidectomy: small portion remains;      Bilateral Tubal Ligation    Bilateral Carpal Tunnel;    right index finger, trigger release and right wrist cyst removed 11; Procedures: colonoscopy      Cataract Removal: bilateral; 9&10- 2016;     Cholecystectomy: laparoscopic; 19;     Procedures:    Colonoscopy ( 14 )    EGD ( 14 )         Family History:         Positive for Hypertension ( brother ).      Positive for Lung Cancer ( father; mother ).      Positive for Seizure Disorder ( brother ).  Father:  at age 63; Cause of death was lung cancer     Mother:  at age 69; Cause of death was adrenal cancer;  Lung Cancer         Social History:     Occupation: [employer]     Marital Status:      Children: 2 children         Tobacco/Alcohol/Supplements:     Last Reviewed on 3/04/2019 01:50 PM by Spurling, Sarah C    Tobacco: She has never smoked.  Non-drinker         Substance Abuse History:     Last Reviewed on 2016 02:59 PM by Shantell Garrison    NEGATIVE         Mental Health History:     Last Reviewed on 2016 02:59 PM by Shantell Garrison        Communicable Diseases (eg STDs):     Last Reviewed on 2016 02:59 PM by Shantell Garrison            Current Problems:     Last Reviewed on 2016 02:59 PM by Shantell Garrison    Anxiety     Fatigue     Postmenopausal atrophic vaginitis     Atrophic vaginitis-Recurrent UTI's     Hip pain     Leukocytopenia, unspecified     IDDM     Acquired hypothyroidism     Essential hypertension, benign     Mixed hyperlipidemia     Unspecified PVD     GERD     Urinary Tract Infection     UTI     Dysuria         Immunizations:     Fluzone (3 + years dose) 10/17/2011     Fluzone Quadrivalent (3+ years) 11/10/2016      Influenza A (H1N1) pf, IM (3+ years) Monovalent 11/18/2009     Fluzone High-Dose pf (>=65 yr) 11/14/2017     Pneumococcal, 23-valent IM/SC (adult and pt >=2yr) 5/26/2009     Adacel (Tdap) 7/11/2012     Zostavax (Zoster live) 9/4/2013         Allergies:     Last Reviewed on 3/04/2019 01:50 PM by Spurling, Sarah C    Aspirin: stomach pain (Adverse Reaction)    Sulfas:    Levaquin:        Current Medications:     Last Reviewed on 3/04/2019 01:59 PM by Spurling, Sarah C    Synthroid 0.075mg Tablet Take 1 tablet(s) by mouth daily--DO NOT SUBSTITUTE!!!!!     Simvastatin 20mg Tablet Take 1 tablet(s) by mouth daily     Fluticasone Propionate 50mcg/1actuation Nasal Spray 1 or 2 sprays in each nostril qday     Lisinopril 40mg Tablet Take 1 tablet(s) by mouth daily     Aspirin (ASA) 81mg Chewable Tablet Chew 1 tablet(s) by mouth qam     Hydrochlorothiazide (HCTZ) 25mg Tablet 1 tab daily     Premarin 0.625mg/1gm Vaginal Cream     Novalog insulin to carb ratio     Amlodipine  5mg Tablet         OBJECTIVE:        Vitals:         Current: 3/4/2019 2:00:48 PM    Ht:  5 ft, 2.75 in;  Wt: 132.8 lbs;  BMI: 23.7    T: 98.2 F (oral);  BP: 122/59 mm Hg (left arm, sitting);  P: 70 bpm (left arm (BP Cuff), sitting);  sCr: 0.77 mg/dL;  GFR: 67.00        Exams:     PHYSICAL EXAM:     GENERAL: vital signs recorded - well developed, well nourished;  no apparent distress;     NECK: range of motion is normal; thyroid is non-palpable;     RESPIRATORY: normal respiratory rate and pattern with no distress; normal breath sounds with no rales, rhonchi, wheezes or rubs;     CARDIOVASCULAR: normal rate; rhythm is regular;  no systolic murmur; no edema;     GASTROINTESTINAL: nontender; normal bowel sounds; no organomegaly;     MUSCULOSKELETAL: normal gait; normal range of motion of all major muscle groups; no limb or joint pain with range of motion; No CVA tenderness     NEUROLOGICAL:  cranial nerves, motor and sensory function, reflexes, gait and  coordination are all intact;     PSYCHIATRIC:  appropriate affect and demeanor; normal speech pattern; grossly normal memory;         Lab/Test Results:             Glucose, Urine:  500 mg/dL (03/04/2019),     Bilirubin, urine:  Negative (03/04/2019),     Ketones, Urine Strip:  Negative (03/04/2019),     Specific Gravity, urine:  1.025 (03/04/2019),     Blood in Urine:  negative (03/04/2019),     pH, urine:  5.5 (03/04/2019),     Protein Urine QL:  negative (03/04/2019),     Urobilinogen, urine:  0.2 E.U./dL (03/04/2019),     Nitrite, Urine:  Positive (03/04/2019),     Leukoctyes, urine:  Negative (03/04/2019),     Appearance:  Clear (03/04/2019),     collection source:  Clean-catch (03/04/2019),     Color:  Dark Yellow (03/04/2019),     Performed by::  VALERIE (03/04/2019),             ASSESSMENT:           599.0   N39.0  Urinary Tract Infection              DDx:         ORDERS:         Meds Prescribed:       Macrobid (Nitrofurantoin) 100mg Capsules 1 tab BID x 10 days  #20 (Twenty) capsule(s) Refills: 0         Lab Orders:       [id number]  Urinalysis, automated, without microscopy  (In-House)         [id number]  [facility] Urine Culture  (Send-Out)                   PLAN:          Urinary Tract Infection     LABORATORY:  Labs ordered to be performed today include Urine culture.            Prescriptions:       Macrobid (Nitrofurantoin) 100mg Capsules 1 tab BID x 10 days  #20 (Twenty) capsule(s) Refills: 0           Orders:       [id number]  Urinalysis, automated, without microscopy  (In-House)         [id number]  [facility]  ([facility]             Patient Education Handouts:       [facility] Medication Compliance              CHARGE CAPTURE:           Primary Diagnosis:     599.0 Urinary Tract Infection            N39.0    Urinary tract infection, site not specified              Orders:          [id number]   Office/outpatient visit; established patient, level 3  (In-House)             [id number]   Urinalysis, automated, without microscopy   (In-House)

## 2021-05-18 NOTE — PROGRESS NOTES
Yohana Casillas  1950     Office/Outpatient Visit    Visit Date: Tue, Oct 20, 2020 04:30 pm    Provider: Rebeca Armando N.P. (Assistant: Lexis Portillo, )    Location: [facility]        Electronically signed by Rebeca Armando N.P. on  10/20/2020 05:09:27 PM                             Subjective:        CC: Ms. Casillas is a 69 year old White female.  fatigue, nausea, body aches, cough;         HPI:           Acute upper respiratory infection, unspecified noted.  These have been present for the past 5 days.  The symptoms include body aches, cough, nasal discharge, frontal sinus pain and pressure, scant, purulent sputum production and nausea.  She denies fever.  She reports recent exposure to illness from family members.  She has already tried to relieve the symptoms with acetaminophen.  Medical history is unremarkable.      ROS:     CONSTITUTIONAL:  Positive for fatigue and body aches.   Negative for chills or fever.      EYES:  Negative for blurred vision.      E/N/T:  Positive for nasal congestion, frequent rhinorrhea, sinus pressure and sore throat.   Negative for ear pain or hoarseness.      CARDIOVASCULAR:  Negative for chest pain and pedal edema.      RESPIRATORY:  Positive for recent cough.   Negative for dyspnea.      GASTROINTESTINAL:  Negative for abdominal pain, constipation, diarrhea, nausea and vomiting.      GENITOURINARY:  Negative for dysuria and frequent urination.      MUSCULOSKELETAL:  Negative for myalgias.      NEUROLOGICAL:  Negative for headaches.      PSYCHIATRIC:  Negative for anxiety, depression, and sleep disturbances.          Past Medical History / Family History / Social History:         Last Reviewed on 10/20/2020 04:49 PM by Rebeca Armando    Past Medical History:                 PAST MEDICAL HISTORY         Hyperlipidemia: Hypercholesterolemia;     Hypertension     Type 1 Diabetes: controlled;     retinopathy, now being monitored, per Dr. Turner Hospitalizations:  uti and drug reaction , at [facility]         CURRENT MEDICAL PROVIDERS:    Cardiologist: Reese    Urologist: dr sara Key NP/CDE         PREVENTIVE HEALTH MAINTENANCE             BONE DENSITY: was last done  osteopenia     COLORECTAL CANCER SCREENING: colonoscopy with normal results     Hepatitis C Medicare Screening: was last done      MAMMOGRAM: was last done 2016 with the following abnormalaties noted-- required more images on left breast     PAP SMEAR: hysterectomy         Surgical History:         Hysterectomy: Partial;     thyroidplasty 11     Thyroidectomy: small portion remains;     Bilateral Tubal Ligation    Bilateral Carpal Tunnel;    right index finger, trigger release and right wrist cyst removed 11; Procedures: colonoscopy      Cataract Removal: bilateral; 9&10- 2016;     Cholecystectomy: laparoscopic; 19;     Procedures:    Colonoscopy ( 14 )    EGD ( 14 ) left vocal cord surgery 19         Family History:         Positive for Hypertension ( brother ).      Positive for Lung Cancer ( father; mother ).      Positive for Seizure Disorder ( brother ).  Father:  at age 63; Cause of death was lung cancer     Mother:  at age 69; Cause of death was adrenal cancer;  Lung Cancer         Social History:     Occupation: [employer] 3-4 days a week     Marital Status:      Children: 2 children         Tobacco/Alcohol/Supplements:     Last Reviewed on 10/20/2020 04:49 PM by Rebeca Armando    Tobacco: She has never smoked.  Non-drinker         Substance Abuse History:     Last Reviewed on 10/20/2020 04:49 PM by Rebeca Armando    NEGATIVE         Mental Health History:     Last Reviewed on 2016 02:59 PM by Shantell Garrison        Communicable Diseases (eg STDs):     Last Reviewed on 2016 02:59 PM by Shantell Garrison        Immunizations:     Fluzone (3 + years dose) 10/17/2011    Fluzone Quadrivalent  (3+ years) 11/10/2016    Influenza A (H1N1) pf, IM (3+ years) Monovalent 11/18/2009    Fluzone High-Dose pf (>=65 yr) 11/14/2017    Fluzone High-Dose pf (>=65 yr) 10/23/2019    Pneumococcal, 23-valent IM/SC (adult and pt >=2yr) 5/26/2009    Adacel (Tdap) 7/11/2012    Zostavax (Zoster live) 9/4/2013        Allergies:     Last Reviewed on 10/20/2020 04:49 PM by Rebeca Armando    Aspirin: Stomach pain  (Adverse Reaction)    Sulfas:      Levaquin:          Current Medications:     Last Reviewed on 10/20/2020 04:49 PM by Rebeca Armando    Lisinopril 40mg Tablet [Take 1 tablet(s) by mouth daily]    Simvastatin 20 mg oral tablet [Take 1 tablet(s) by mouth daily]    levothyroxine 75 mcg oral tablet [TAKE ONE TABLET BY MOUTH DAILY]    aspirin 81 mg oral tablet,chewable [Chew 1 tablet(s) by mouth qam]    amLODIPine 10 mg oral tablet [1 / 2 tab daily]    Novalog insulin to carb ratio     Fluticasone Propionate 50mcg/1actuation Nasal Spray [1 or 2 sprays in each nostril qday ]    Premarin 0.625 mg/gram Vaginal Cream    hydroCHLOROthiazide 25 mg oral tablet [1 tab daily]    escitalopram oxalate 10 mg oral tablet [TAKE ONE TABLET BY MOUTH DAILY]    Crestor 5 mg oral tablet [TAKE ONE TABLET BY MOUTH DAILY]        Objective:        Vitals:         Current: 10/20/2020 4:36:45 PM    Ht:  5 ft, 2.75 in;  Wt: 133.7 lbs;  BMI: 23.9T: 97.2 F (oral);  BP: 135/57 mm Hg (left arm, sitting);  P: 53 bpm (left arm (BP Cuff), sitting);  sCr: 0.89 mg/dL;  GFR: 57.37        Exams:     PHYSICAL EXAM:     GENERAL: vital signs recorded - well developed, well nourished;  appears minimally ill;     EYES: PERRL, EOMI     E/N/T: EARS: both TMs are have fluid behind them;  NOSE: nasal mucosa is erythematous;  OROPHARYNX: posterior pharynx shows erythema;     NECK:  supple, full ROM; no thyromegaly; no carotid bruits;     RESPIRATORY: normal respiratory rate and pattern with no distress; normal breath sounds with no rales, rhonchi, wheezes or rubs;      CARDIOVASCULAR: normal rate; rhythm is regular;  no systolic murmur; no edema;     GASTROINTESTINAL: nontender; normal bowel sounds; no organomegaly;     LYMPHATIC: no enlargement of cervical or facial nodes; no supraclavicular nodes;     BREAST/INTEGUMENT: no rashs or lesions noted;     MUSCULOSKELETAL:  gait normal;     NEUROLOGIC: mental status: alert and oriented x 3; GROSSLY INTACT     PSYCHIATRIC:  appropriate affect and demeanor; normal speech pattern; grossly normal memory;         Lab/Test Results:         Influenza A and B: Negative (10/20/2020),     Performed by:: louann (10/20/2020),             Assessment:         J06.9   Acute upper respiratory infection, unspecified       R53.83   Other fatigue       R11.0   Nausea           ORDERS:         Meds Prescribed:       [New Rx] ZyrTEC 10 mg oral tablet [take 1 tablet (10 mg) by oral route once daily], #30 (thirty) tablets, Refills: 0 (zero)       [New Rx] guaiFENesin 600 mg oral Tablet,Extended Release 12 hr [take 1 tablet (600 mg) by oral route every 12 hours], #20 (twenty) tablets, Refills: 0 (zero)       [New Rx] promethazine 12.5 mg oral tablet [take 1 tablet (12.5 mg) by oral route 3 times per day], #12 (twelve) tablets, Refills: 0 (zero)         Radiology/Test Orders:       [id number]  COVID 19 Testing [facility]  (Send-Out)              Lab Orders:       [id number]  Infectious agent antigen detection by immunoassay; Influenza  (In-House)            [id number]  Infectious agent antigen detection by immunoassay; Influenza  (In-House)                      Plan:         Acute upper respiratory infection, unspecifiedIf continued symptoms over the next 5-7 days would consider abx         RECOMMENDATIONS given include: Push Fluids, Rest, Follow up if no improvement or worsening symptoms like high fevers, vomiting, weakness, or increasing shortness of air.     and patient diagnosed with viral upper respiratory infection at this time. Patient educated on supportive care including  rest, fluids, tylenol and motrin use as needed. Patient to follow up with any new or worsening symptoms. Educated the patient on the course of viral infection and that there is currently no need for antibiotics. Discussed OTC medications to include antihistamines, decongestants and cough medication..            Prescriptions:       [New Rx] ZyrTEC 10 mg oral tablet [take 1 tablet (10 mg) by oral route once daily], #30 (thirty) tablets, Refills: 0 (zero)       [New Rx] guaiFENesin 600 mg oral Tablet,Extended Release 12 hr [take 1 tablet (600 mg) by oral route every 12 hours], #20 (twenty) tablets, Refills: 0 (zero)         Other fatigue          Orders:       [id number]  COVID 19 Testing [facility]  (Send-Out)            [id number]  Infectious agent antigen detection by immunoassay; Influenza  (In-House)            [id number]  Infectious agent antigen detection by immunoassay; Influenza  (In-House)              Nausea          Prescriptions:       [New Rx] promethazine 12.5 mg oral tablet [take 1 tablet (12.5 mg) by oral route 3 times per day], #12 (twelve) tablets, Refills: 0 (zero)             Charge Capture:         Primary Diagnosis:     J06.9  Acute upper respiratory infection, unspecified           Orders:      [id number]  Office/outpatient visit; established patient, level 3  (In-House)              R53.83  Other fatigue           Orders:      [id number]  Infectious agent antigen detection by immunoassay; Influenza  (In-House)            [id number]  Infectious agent antigen detection by immunoassay; Influenza  (In-House)              R11.0  Nausea         ADDENDUMS:      ____________________________________    Addendum: 10/23/2020 10:53 AM - Rebeca Armando        ROS + nausea

## 2021-05-18 NOTE — PROGRESS NOTES
Yohana Casillas  1950     Office/Outpatient Visit    Visit Date: Mon, Dec 14, 2020 10:07 am    Provider: Shanthi Caceres N.P. (Assistant: Lissette Hidalgo MA)    Location: [facility]        Electronically signed by Shanthi Caceres N.P. on  12/14/2020 10:46:31 AM                             Subjective:        CC: Ms. Casillas is a 70 year old White female.  This is a follow-up visit.  CHECK UP; needs refills of  rx for thyroid and Lexapro/ Crestor        HPI:           Patient presents with other specified hypothyroidism.  She is currently taking Levothyroid, 75 mcg daily.  TSH was last checked 5 months ago.  The result was reported as normal.            Additionally, she presents with history of mixed hyperlipidemia.  current treatment includes Zocor.  Compliance with treatment has been good; she takes her medication as directed.  She denies experiencing any hypercholesterolemia related symptoms.  Most recent lab tests include Hemoglobin A1c:  8.2 (%) (11/19/2020), HDL:  82 (mg/dL) (07/06/2020), LDL:  85 (mg/dL) (07/06/2020), Total Cholesterol:  179 (mg/dL) (07/06/2020), Triglycerides:  60 (mg/dL) (07/06/2020), TSH:  2.190 (mIU/L) (07/06/2020), Microalbuminuria:  6.7 (mg/g creat) (05/05/2020), Dilated Eye Exam by date:  09/28/2020 (07/06/2020).            Essential (primary) hypertension details; her current cardiac medication regimen includes a diuretic ( Hydrochlorothiazide (HCTZ) ), an ACE inhibitor ( Zestril ), and a calcium channel blocker ( Norvasc ).  She is tolerating the medication well without side effects.  Her cardiologist fills her bp rx.     ROS:     CONSTITUTIONAL:  Negative for fever.      CARDIOVASCULAR:  Negative for chest pain, palpitations, tachycardia, orthopnea, and edema.      RESPIRATORY:  Negative for recent cough and dyspnea.      NEUROLOGICAL:  Negative for dizziness, headaches, paresthesias, and weakness.      ENDOCRINE:  Positive for T1DM avg glucose less tahn  120/see  last A1C last month 8.1.      PSYCHIATRIC:  Positive for on lexapro.  works well         Past Medical History / Family History / Social History:         Last Reviewed on 2020 10:32 AM by Shanthi Caceres    Past Medical History:                 PAST MEDICAL HISTORY         Hyperlipidemia: Hypercholesterolemia;     Hypertension     Type 1 Diabetes: controlled;     retinopathy, now being monitored, per Dr. Turner Hospitalizations: uti and drug reaction , at [facility]         CURRENT MEDICAL PROVIDERS:    Cardiologist: Reese    Urologist: dr sara Key NP/CDE         PREVENTIVE HEALTH MAINTENANCE             BONE DENSITY: was last done  osteopenia     COLORECTAL CANCER SCREENING: colonoscopy with normal results     Hepatitis C Medicare Screening: was last done      MAMMOGRAM: was last done 2016 with the following abnormalaties noted-- required more images on left breast     PAP SMEAR: hysterectomy         Surgical History:         Hysterectomy: Partial;     thyroidplasty 11     Thyroidectomy: small portion remains;     Bilateral Tubal Ligation    Bilateral Carpal Tunnel;    right index finger, trigger release and right wrist cyst removed 11; Procedures: colonoscopy      Cataract Removal: bilateral; 9&10- 2016;     Cholecystectomy: laparoscopic; 19;     Procedures:    Colonoscopy ( 14 )    EGD ( 14 ) left vocal cord surgery 19         Family History:         Positive for Hypertension ( brother ).      Positive for Lung Cancer ( father; mother ).      Positive for Seizure Disorder ( brother ).  Father:  at age 63; Cause of death was lung cancer     Mother:  at age 69; Cause of death was adrenal cancer;  Lung Cancer         Social History:     Occupation: [employer] de[employer] 3-4 days a week     Marital Status:      Children: 2 children         Tobacco/Alcohol/Supplements:     Last Reviewed on 2020 10:43 AM  by Shanthi Caceres    Tobacco: She has never smoked.  Non-drinker         Immunizations:     influenza, high-dose, quadrivalent (FLUZONE HIGH-DOSE QUAD 2020-21) 11/19/2020    Fluzone (3 + years dose) 10/17/2011    Fluzone Quadrivalent (3+ years) 11/10/2016    Influenza A (H1N1) pf, IM (3+ years) Monovalent 11/18/2009    Fluzone High-Dose pf (>=65 yr) 11/14/2017    Fluzone High-Dose pf (>=65 yr) 10/23/2019    Pneumococcal, 23-valent IM/SC (adult and pt >=2yr) 5/26/2009    Adacel (Tdap) 7/11/2012    Zostavax (Zoster live) 9/4/2013        Allergies:     Last Reviewed on 12/14/2020 10:11 AM by Lissette Hidalgo    Aspirin: Stomach pain  (Adverse Reaction)    Sulfas:      Levaquin:          Current Medications:     Last Reviewed on 12/14/2020 10:42 AM by Shanthi Caceres    levothyroxine 75 mcg oral tablet [TAKE ONE TABLET BY MOUTH DAILY]    Lisinopril 40mg Tablet [Take 1 tablet(s) by mouth daily]    aspirin 81 mg oral tablet,chewable [Chew 1 tablet(s) by mouth qam]    amLODIPine 10 mg oral tablet [1 / 2 tab daily]    Novalog insulin to carb ratio     Fluticasone Propionate 50mcg/1actuation Nasal Spray [1 or 2 sprays in each nostril qday ]    Premarin 0.625 mg/gram Vaginal Cream    hydroCHLOROthiazide 25 mg oral tablet [1 tab daily]    escitalopram oxalate 10 mg oral tablet [TAKE ONE TABLET BY MOUTH DAILY]    Crestor 5 mg oral tablet [TAKE ONE TABLET BY MOUTH DAILY]    ZyrTEC 10 mg oral tablet [take 1 tablet (10 mg) by oral route once daily]    guaiFENesin 600 mg oral Tablet,Extended Release 12 hr [take 1 tablet (600 mg) by oral route every 12 hours]    promethazine 12.5 mg oral tablet [take 1 tablet (12.5 mg) by oral route 3 times per day]        Objective:        Vitals:         Current: 12/14/2020 10:13:37 AM    Ht:  5 ft, 2.75 in;  Wt: 135.2 lbs;  BMI: 24.1T: 96.4 F (temporal);  BP: 144/54 mm Hg (left arm, sitting);  P: 45 bpm (finger clip, sitting);  sCr: 0.89 mg/dL;  GFR: 56.88        Exams:     PHYSICAL EXAM:      GENERAL: vital signs recorded - well developed, well nourished;  no apparent distress;     NECK: carotid exam reveals no bruits;     RESPIRATORY: normal respiratory rate and pattern with no distress; normal breath sounds with no rales, rhonchi, wheezes or rubs;     CARDIOVASCULAR: normal rate; rhythm is regular;  no systolic murmur;    Peripheral Pulses: posterior tibial: equal bilaterally;  no edema;     BREAST/INTEGUMENT: skin of feet and toenails intact;     NEUROLOGIC: sensation intact to monofilament bilaterally;     PSYCHIATRIC:  appropriate affect and demeanor; normal speech pattern; grossly normal memory;         Assessment:         E03.8   Other specified hypothyroidism       E78.2   Mixed hyperlipidemia       I10   Essential (primary) hypertension       F41.9   Anxiety disorder, unspecified       E10.9   Type 1 diabetes mellitus without complications           ORDERS:         Meds Prescribed:       [Refilled] escitalopram oxalate 10 mg oral tablet [TAKE ONE TABLET BY MOUTH DAILY], #90 (ninety) tablets, Refills: 0 (zero)       [Refilled] levothyroxine 75 mcg oral tablet [TAKE ONE TABLET BY MOUTH DAILY], #90 (ninety) tablets, Refills: 0 (zero)       [Refilled] Crestor 5 mg oral tablet [TAKE ONE TABLET BY MOUTH DAILY], #90 (ninety) tablets, Refills: 0 (zero)         Lab Orders:       [id number]  TSH - [facility] TSH  (Send-Out)            FUTURE  Future order to be done at patients convenience  (Send-Out)            [id number]  [facility] CMP AND LIPID: [id number]  (Send-Out)                      Plan:         Other specified hypothyroidism    LABORATORY:  Labs ordered to be performed today include TSH.            Prescriptions:       [Refilled] levothyroxine 75 mcg oral tablet [TAKE ONE TABLET BY MOUTH DAILY], #90 (ninety) tablets, Refills: 0 (zero)           Orders:       [id number]  TSH - [facility] TSH  (Send-Out)              Mixed hyperlipidemia        FOLLOW-UP TESTING #1: FOLLOW-UP LABORATORY:  Labs to be scheduled in the  future include HTN/Lipid Panel: CMP, Lipid.      RECOMMENDATIONS given include: exercise and low cholesterol/low fat diet.      FOLLOW-UP: pending labs           Prescriptions:       [Refilled] Crestor 5 mg oral tablet [TAKE ONE TABLET BY MOUTH DAILY], #90 (ninety) tablets, Refills: 0 (zero)           Orders:       FUTURE  Future order to be done at patients convenience  (Send-Out)            [id number]  [facility] CMP AND LIPID: [id number], [id number]  (Send-Out)              Essential (primary) hypertensionjust saw cardiologist /continue current rx's        Anxiety disorder, unspecified          Prescriptions:       [Refilled] escitalopram oxalate 10 mg oral tablet [TAKE ONE TABLET BY MOUTH DAILY], #90 (ninety) tablets, Refills: 0 (zero)         Type 1 diabetes mellitus without complicationsupdated diabetes flow sheet; keep appt with  in 1-2021 and podiatrist in 1-2021            Patient Recommendations:        For  Mixed hyperlipidemia:            The following laboratory testing has been ordered: Maintain a regular exercise program. Reduce the amount of cholesterol and saturated fat in your diet.              Charge Capture:         Primary Diagnosis:     E03.8  Other specified hypothyroidism           Orders:      [id number]  Office/outpatient visit; established patient, level 4  (In-House)              E78.2  Mixed hyperlipidemia     I10  Essential (primary) hypertension     F41.9  Anxiety disorder, unspecified     E10.9  Type 1 diabetes mellitus without complications         ADDENDUMS:      ____________________________________    Addendum: 12/17/2020 10:17 AM - Shanthi Caceres reviewed old records/ records from diabetes specialist and podiatry.

## 2021-05-26 ENCOUNTER — OFFICE VISIT CONVERTED (OUTPATIENT)
Dept: FAMILY MEDICINE CLINIC | Age: 71
End: 2021-05-26
Attending: NURSE PRACTITIONER

## 2021-05-26 ENCOUNTER — HOSPITAL ENCOUNTER (OUTPATIENT)
Dept: OTHER | Facility: HOSPITAL | Age: 71
Discharge: HOME | End: 2021-05-26
Attending: NURSE PRACTITIONER

## 2021-05-26 LAB
ALBUMIN SERPL-MCNC: 4.2 G/DL (ref 3.5–5)
ALBUMIN/GLOB SERPL: 1.4 {RATIO} (ref 1.4–2.6)
ALP SERPL-CCNC: 76 U/L (ref 43–160)
ALT SERPL-CCNC: 15 U/L (ref 10–40)
ANION GAP SERPL CALC-SCNC: 14 MMOL/L (ref 8–19)
AST SERPL-CCNC: 21 U/L (ref 15–50)
BASOPHILS # BLD AUTO: 0.04 10*3/UL (ref 0–0.2)
BASOPHILS NFR BLD AUTO: 1.2 % (ref 0–3)
BILIRUB SERPL-MCNC: 0.24 MG/DL (ref 0.2–1.3)
BUN SERPL-MCNC: 15 MG/DL (ref 5–25)
BUN/CREAT SERPL: 22 {RATIO} (ref 6–20)
CALCIUM SERPL-MCNC: 9.1 MG/DL (ref 8.7–10.4)
CHLORIDE SERPL-SCNC: 104 MMOL/L (ref 99–111)
CONV ABS IMM GRAN: 0 10*3/UL (ref 0–0.2)
CONV CO2: 26 MMOL/L (ref 22–32)
CONV IMMATURE GRAN: 0 % (ref 0–1.8)
CONV TOTAL PROTEIN: 7.3 G/DL (ref 6.3–8.2)
CREAT UR-MCNC: 0.67 MG/DL (ref 0.5–0.9)
DEPRECATED RDW RBC AUTO: 42 FL (ref 36.4–46.3)
EOSINOPHIL # BLD AUTO: 0.16 10*3/UL (ref 0–0.7)
EOSINOPHIL # BLD AUTO: 4.6 % (ref 0–7)
ERYTHROCYTE [DISTWIDTH] IN BLOOD BY AUTOMATED COUNT: 12.5 % (ref 11.7–14.4)
EST. AVERAGE GLUCOSE BLD GHB EST-MCNC: 160 MG/DL
GFR SERPLBLD BASED ON 1.73 SQ M-ARVRAT: >60 ML/MIN/{1.73_M2}
GLOBULIN UR ELPH-MCNC: 3.1 G/DL (ref 2–3.5)
GLUCOSE SERPL-MCNC: 73 MG/DL (ref 65–99)
HBA1C MFR BLD: 7.2 % (ref 3.5–5.7)
HCT VFR BLD AUTO: 38.8 % (ref 37–47)
HGB BLD-MCNC: 12.9 G/DL (ref 12–16)
LYMPHOCYTES # BLD AUTO: 1.25 10*3/UL (ref 1–5)
LYMPHOCYTES NFR BLD AUTO: 36.2 % (ref 20–45)
MCH RBC QN AUTO: 30.5 PG (ref 27–31)
MCHC RBC AUTO-ENTMCNC: 33.2 G/DL (ref 33–37)
MCV RBC AUTO: 91.7 FL (ref 81–99)
MONOCYTES # BLD AUTO: 0.38 10*3/UL (ref 0.2–1.2)
MONOCYTES NFR BLD AUTO: 11 % (ref 3–10)
NEUTROPHILS # BLD AUTO: 1.62 10*3/UL (ref 2–8)
NEUTROPHILS NFR BLD AUTO: 47 % (ref 30–85)
NRBC CBCN: 0 % (ref 0–0.7)
OSMOLALITY SERPL CALC.SUM OF ELEC: 289 MOSM/KG (ref 273–304)
PLATELET # BLD AUTO: 258 10*3/UL (ref 130–400)
PMV BLD AUTO: 9.8 FL (ref 9.4–12.3)
POTASSIUM SERPL-SCNC: 4.2 MMOL/L (ref 3.5–5.3)
RBC # BLD AUTO: 4.23 10*6/UL (ref 4.2–5.4)
SODIUM SERPL-SCNC: 140 MMOL/L (ref 135–147)
WBC # BLD AUTO: 3.45 10*3/UL (ref 4.8–10.8)

## 2021-05-27 LAB — CONV UREA BREATH TEST: NORMAL

## 2021-05-28 ENCOUNTER — OFFICE VISIT CONVERTED (OUTPATIENT)
Dept: DIABETES SERVICES | Facility: HOSPITAL | Age: 71
End: 2021-05-28
Attending: NURSE PRACTITIONER

## 2021-05-28 ENCOUNTER — HOSPITAL ENCOUNTER (OUTPATIENT)
Dept: DIABETES SERVICES | Facility: HOSPITAL | Age: 71
Discharge: HOME | End: 2021-05-28
Attending: NURSE PRACTITIONER

## 2021-05-28 VITALS
OXYGEN SATURATION: 99 % | HEART RATE: 52 BPM | SYSTOLIC BLOOD PRESSURE: 117 MMHG | DIASTOLIC BLOOD PRESSURE: 71 MMHG | BODY MASS INDEX: 25.27 KG/M2 | WEIGHT: 137.31 LBS | RESPIRATION RATE: 16 BRPM | HEIGHT: 62 IN

## 2021-05-28 VITALS
BODY MASS INDEX: 25.62 KG/M2 | WEIGHT: 139.25 LBS | HEIGHT: 62 IN | TEMPERATURE: 97.5 F | OXYGEN SATURATION: 96 % | SYSTOLIC BLOOD PRESSURE: 135 MMHG | RESPIRATION RATE: 16 BRPM | DIASTOLIC BLOOD PRESSURE: 62 MMHG | HEART RATE: 51 BPM

## 2021-05-28 NOTE — PROGRESS NOTES
Patient: TULIO WATSON     Acct: [id number]     Report: #[id number]  UNIT #: [id number]     : 1950    Encounter Date:2019  PRIMARY CARE: EDUIN DEL CASTILLO  ***Signed***  --------------------------------------------------------------------------------------------------------------------  Encounter Date      2019            Reason for Visit      This patient is seen in the office today for follow-up evaluation for insulin     pump management.  She is a 68-year-old female patient with a history of type 1     diabetes.  This patient is currently managed on a tandem insulin pump with a     continuous glucose sensor.  Her devices uploaded and the records were reviewed     with the patient.            14-day report indicates an average blood glucose of 183 mg/dL with a high of 340    in the level 45.  53% of blood glucose levels are above a target of 180 mg/dL     while 44% are within a target of  and 3% are below target.  The continuous    glucose sensor indicates an average glucose of 174 mg/dL with a high of 400 and     a low 40.  Patient is currently using approximately 30 units of insulin each     day.  She is testing her blood sugar 3 times a day.  The sensor report indicates    postprandial hyperglycemia after the breakfast and lunchtime meals and then some    mild hyperglycemia in the evenings after 9 PM.  The patient reports that she is     having some problems with hypoglycemia during the overnight hours as well as     after her suppertime meal.            An A1c collected in January was 7.3%            History            History: She is being treated for some diabetic retinopathy of the left eye     otherwise she denies any health issues or changes since last being seen.            Allergies/Medications      Allergies:        Coded Allergies:             SULFA (SULFONAMIDE ANTIBIOTICS) (Verified  Allergy, Unknown, RASH, 19)      Medications    Last Reconciled on 19 9:38 am  by JASPREET BATEMAN      Arias-Fluticasone (Fluticasone 50 mcg) 16 Gm Spray.susp      1 PUFFS NARE EACH QDAY, #1 BOTTLE 0 Refills         Reported         1/2/19       Hydrochlorothiazide (Hydrochlorothiazide*) 25 Mg Tablet      25 MG PO QDAY, #30 TAB 0 Refills         Reported         1/2/19       Lactobacillus Rhamnosus  (Probiotic Digestive Care) 1 Each Capsule      1 EACH PO, CAP         Reported         1/5/18       Multivitamin (Multivitamins) 1 Each Capsule      1 EACH PO QDAY, CAP         Reported         1/5/18       Insulin Human Aspart (NovoLOG VIAL) 100 Unit/1 Ml Vial      UNITS SUBQ QDAY, #1 VIAL 0 Refills         Reported         1/5/18       Pantoprazole (Protonix*) 20 Mg Tablet.dr      40 MG PO QDAY PRN for acid reflux, TAB         Reported         1/5/18       amLODIPine (amLODIPine) 10 Mg Tablet      10 MG PO QDAY, #30 TAB 0 Refills         Reported         1/5/18       Artificial Tears (Systane 0.3-0.4% Ophth) 1 Drop Drops      1 DROPS EYE EACH BID, ML         Reported         12/17/14       Lisinopril* (Lisinopril*) 40 Mg Tablet      40 MG PO QDAY, #30 TAB 0 Refills         Reported         12/17/14       Levothyroxine Sodium (Levoxyl*) 0.075 Mg Tablet      1 TAB PO QAM         Reported         12/16/11       Simvastatin (Zocor) 20 Mg Tablet      1 TAB PO QDAY         Reported         12/16/11            Quality Measures      Current yr A1c result 7-9%:  Yes      Neg ua mAlb this yr in chart:  Yes            Impression      Type 1 diabetes uncontrolled            Plan            To minimize both postprandial hyperglycemia as well as overnight hypoglycemia     the following changes were made to her pump settings:            The 12 AM to 4 AM basal rate of 0.5 was reduced to 0.35      The 4 AM carbohydrate ratio of 1-20 was reduced to 1-19      The 11 AM carbohydrate ratio of 1-25 was reduced to 22      The 4 PM and 5 PM carbohydrate ratios of 15 were increased to 17            The patient will  monitor glucose levels carefully to see if the changes made     today will fix the problem as previously discussed.  She will be scheduled for     routine follow-up appointment in 3 months.            Total time spent with patient was 10 minutes of which half of that time was     spent counseling the patient regarding hypoglycemia prevention            PREVENTION      Hx Influenza Vaccination:  Yes      Date Influenza Vaccine Given:  Nov 1, 2018      Influenza Vaccine Declined:  No      2 or More Falls Past Year?:  No      Fall Past Year with Injury?:  No      Hx Pneumococcal Vaccination:  Yes      Encouraged to follow-up with:  PCP regarding preventative exams.                 Disclaimer: Converted document may not contain table formatting or lab diagrams. Please see [employer] for the authenticated document.

## 2021-05-28 NOTE — PROGRESS NOTES
Patient: TULIO WATSON     Acct: [id number]     Report: #[id number]  UNIT #: [id number]     : 1950    Encounter Date:2019  PRIMARY CARE: EDUIN DEL CASTILLO  ***Signed***  --------------------------------------------------------------------------------------------------------------------  Encounter Date      2019            Reason for Visit      This patient returns to the office today for a follow-up appointment for insulin    pump management.  She is a 68-year-old female patient with history of type 1     diabetes.  She is currently managed on a tandem insulin pump.  Her pump was     uploaded for 14-day period of time and the record was reviewed with patient.            Pump record indicates an average glucose of 201 mg/dL with a high of 345 and a     low of 68.  She is testing her blood sugar 4 times each day.  She is using     approximately 30 units of insulin each day.  61% of glucose levels are above the    target of 180 mg/dL, 37% are within the target of , and 2% are below     target            She also wears a personal continuous glucose sensor with her insulin pump.  She     uses a Dexcom sensor.  The sensor glucose averages 176 mg/dL with a high of 348     and a low of 40.  Patient states she is not currently wearing her sensor because    the transmitter failed and she is expecting a replacement device before the end     of the month.  Because she has been unable to wear her sensor over the last week    there is no pattern report available at this time.  The patient does report she     is experiencing hypoglycemia around 2 AM with levels between 40 and 60 mg/dL.  S    he states that she also is having high glucose levels after her breakfast meal.            History            History: Patient underwent cholecystectomy on .            Allergies/Medications      Allergies:        Coded Allergies:             SULFA (SULFONAMIDE ANTIBIOTICS) (Verified  Allergy, Unknown,  RASH, 1/2/19)      Medications    Last Reconciled on 1/20/19 10:16 pm by JASPREET Bianchi-Fluticasone (Fluticasone 50 mcg) 16 Gm Spray.susp      1 PUFFS NARE EACH QDAY, #1 BOTTLE 0 Refills         Reported         1/2/19       Hydrochlorothiazide (Hydrochlorothiazide*) 25 Mg Tablet      25 MG PO QDAY, #30 TAB 0 Refills         Reported         1/2/19       Lactobacillus Rhamnosus  (Probiotic Digestive Care) 1 Each Capsule      1 EACH PO, CAP         Reported         1/5/18       Multivitamin (Multivitamins) 1 Each Capsule      1 EACH PO QDAY, CAP         Reported         1/5/18       Insulin Human Aspart (NovoLOG VIAL) 100 Unit/1 Ml Vial      UNITS SUBQ QDAY, #1 VIAL 0 Refills         Reported         1/5/18       Pantoprazole (Protonix*) 20 Mg Tablet.dr      40 MG PO QDAY PRN for acid reflux, TAB         Reported         1/5/18       amLODIPine (amLODIPine) 10 Mg Tablet      10 MG PO QDAY, #30 TAB 0 Refills         Reported         1/5/18       Artificial Tears (Systane) 1 Drop Drops      1 DROPS EYE EACH BID, ML         Reported         12/17/14       Lisinopril* (Lisinopril*) 40 Mg Tablet      40 MG PO QDAY, #30 TAB 0 Refills         Reported         12/17/14       Levothyroxine Sodium (Levoxyl*) 0.075 Mg Tablet      1 TAB PO QAM         Reported         12/16/11       Simvastatin (Zocor) 20 Mg Tablet      1 TAB PO QDAY         Reported         12/16/11            EXAM      EXAM: Skin examination shows well-healed laparoscopic abdominal incisions            Quality Measures      Current yr A1c result 7-9%:  Yes      Neg ua mAlb this yr in chart:  Yes            Impression      Type 1 diabetes uncontrolled            Plan            At this time because of the patient's problematic overnight hypoglycemia at     around 2 AM the 12 AM to 4 AM basal rate of 0.65 was reduced to 0.5 units/h.      Because the patient complains of postprandial hyperglycemia after the breakfast     meal the 4 AM to 7 AM  carbohydrate ratio of 1-25 was reduced to 1-20.  The     patient will continue to monitor her glucose 4 times each day and resume her     continuous glucose sensor as soon as the transmitter is available.  She will     call our office if the changes today do not correct the problems previously     described.  She will be seen for routine follow-up appointment in 3 months.            Total time spent with patient was 15 minutes of which half of that time was     spent counseling the patient regarding transmitter troubleshooting and     hypoglycemia prevention            Patient Education      Yes            PREVENTION      Hx Influenza Vaccination:  Yes (2018)      Date Influenza Vaccine Given:  Nov 1, 2018      Influenza Vaccine Declined:  No      2 or More Falls Past Year?:  No      Fall Past Year with Injury?:  No      Hx Pneumococcal Vaccination:  No      Encouraged to follow-up with:  PCP regarding preventative exams.                 Disclaimer: Converted document may not contain table formatting or lab diagrams. Please see [employer] System for the authenticated document.

## 2021-05-28 NOTE — PROGRESS NOTES
Patient: YOHANA WATSON     Acct: [id number]     Report: #[id number]  UNIT #: [id number]     : 1950    Encounter Date:2020  PRIMARY CARE: EDUIN DEL CASTILLO  ***Signed***  --------------------------------------------------------------------------------------------------------------------  History of Present Illness            Chief Complaint: Type 1 diabetes uncontrolled            Yohana Watson is presenting for evaluation via Video and Audio conferencing.     Verbal consent obtained via Video and Audio before beginning the visit.            Provider spent 14 minutes with the patient during telehealth visit.            The following staff were present during the visit: JACKIE Perez and Christiano Lund RN                         Overview of Symptoms      This patient is being evaluated today for insulin pump management.  69-year-old     female patient with a history of type 1 diabetes uncontrolled.  She is currently    managed on a tandem insulin pump.  She is also using a Dexcom continuous glucose    sensor.  Because this appointment is a video telehealth visit we are unable to     upload her insulin pump however we were able to access her Dexcom sensor report.     The patient reports that she has been having problems with fluctuating glucose     levels.  She states she has been having episodes of hypoglycemia around 8 AM in     the morning.  She states that she is having to eat to prevent lows during the o    vernight hours.              Her continuous glucose sensor report indicates an average glucose of 154 mg/dL     with a standard deviation of 61 mg/dL.  55% of glucose levels are within target     range while 38% are above target.  8% of glucose levels are below target with 1%    being severe lows less than 54 mg/dL.  The patient does have overnight     hypoglycemia and has random periods of hypoglycemia during the daytime hours     between 6 AM and 6 PM.  Her average glucose levels during  this timeframe are     sometimes elevated but there is a wide variation in glucose levels.  The patient    attributes this to her work environment.  She has continued to work during the     pandemic.            Allergies and Medications      Allergies:        Coded Allergies:             SULFA (SULFONAMIDE ANTIBIOTICS) (Verified  Allergy, Unknown, RASH, 1/2/19)      Medications    Last Reconciled on 5/3/20 11:40 pm by JASPREET BATEMAN      Escitalopram Oxalate (Escitalopram Oxalate*) 10 Mg Tablet      10 MG PO HS, TAB         Reported         4/29/20       amLODIPine (amLODIPine) 5 Mg Tablet      5 MG PO QDAY, #30 TAB         Reported         4/29/20       Arias-Fluticasone (Fluticasone 50 mcg) 16 Gm Spray.susp      1 PUFFS NARE EACH QDAY, #1 BOTTLE 0 Refills         Reported         1/2/19       Hctz (hydroCHLOROthiazide) 25 Mg Tablet      25 MG PO QDAY, #30 TAB 0 Refills         Reported         1/2/19       Lactobacillus Rhamnosus  (Probiotic Digestive Care) 1 Each Capsule      1 EACH PO, CAP         Reported         1/5/18       Multivitamin (Multivitamins) 1 Each Capsule      1 EACH PO QDAY, CAP         Reported         1/5/18       Insulin Human Aspart (novoLOG VIAL) 100 Unit/1 Ml Vial      UNITS SUBQ QDAY, #1 VIAL 0 Refills         Reported         1/5/18       Pantoprazole (Protonix) 20 Mg Tablet.dr      40 MG PO QDAY PRN for acid reflux, TAB         Reported         1/5/18       Artificial Tears (Systane 0.3-0.4% Ophth) 1 Drop Drops      1 DROPS EYE EACH BID, ML         Reported         12/17/14       Lisinopril* (Lisinopril*) 40 Mg Tablet      40 MG PO QDAY, #30 TAB 0 Refills         Reported         12/17/14       Levothyroxine Sodium (Levoxyl*) 0.075 Mg Tablet      1 TAB PO QAM         Reported         12/16/11            Past Medical,Surg,Family Hx      Past Medical History:  Diabetes Type 1, High Cholesterol, Hypertension, Thyroid     Disease      Past Surgical History:  Cholecystectomy, Hysterectomy       Other Surgical History      partial thyroidectomy; carpal tunnel release bilateral; thyroplasty      Family History:  Type II Dm (brother)      Other History      Hypothyroidism; vocal cord paralysis; anxiety            Social History      Smoking status:  Never smoker            Review of Systems      Psychiatric:  Admits Anxiety (with driving; improved now)            Exam      Constitutional/Appearance:  Well Nourished, No Acute Distress      Head/Face:  Atraumatic      Eyes:  Extracocular move intact, No Scleral Icterus      Respiratory:  Breathing comfortably, No Cough      Skin: General Appearance:  No Visable Rashes on face, No Lesions on face      Neurologic Orientation:  Grossly orientated to Person, Place, No Facial Drop      Psychiatric:  Normal Mood, Normal Affect            Plan and Patient Instructions      Plan      To decrease the risk of hypoglycemia the following changes were made to the     patient's pump settings:            The 12 am basal rate of 0.7 was decreased to 0.5 units/h      The 4 PM basal rate increase of from 0.8-0.85 was deleted            The patient was instructed on the use of the temporary basal rate feature of her    pump with directions on how to activate this setting in her pump.  She is     encouraged to use this when working to prevent hypoglycemia given the wide     fluctuation in glucose levels from day-to-day due to activity.            No other changes are made at this time.  She will continue to monitor her     glucose levels carefully using her Dexcom continuous glucose sensor.  She will     be scheduled for follow-up appointment in this office in 3 months.  We will     collect an A1c and urine microalbumin prior to her follow-up appointment.      Instructions      * Chronic conditions reviewed and taken into consideration for today's treatment      plan.      * Patient instructed to seek medical attention urgently for new or worsening       symptoms.      *  Patient was educated/instructed on their diagnosis, treatment and medications       prior to discharge from the Video and Audio visit today.            Electronically signed by JASPREET BATEMAN  05/03/2020 23:40       Disclaimer: Converted document may not contain table formatting or lab diagrams. Please see [employer] [facility] for the authenticated document.

## 2021-05-28 NOTE — PROGRESS NOTES
Patient: TULIO WATSON     Acct: [id number]     Report: #[id number]  UNIT #: [id number]     : 1950    Encounter Date:2018  PRIMARY CARE: EDUIN DEL CASTILLO  ***Signed***  --------------------------------------------------------------------------------------------------------------------  Encounter Date      Sep 4, 2018            Reason for Visit      This patient returns to the office today for follow-up evaluation for insulin     pump management.  She is a 67-year-old female patient with history of type I     diabetes.  She is currently managed on a tandem insulin pump with a Dexcom     continuous glucose sensor.  Her devices were uploaded and the records were     reviewed with the patient.            Pump records indicate an average blood glucose of 196 mg/dL with a high of 472     and a low of 45.  She is entering glucose levels 7-8 times each day.  She is     using approximately 30 units of insulin each day.  The patient is a really good     job entering all of her carbohydrate intake throughout the day.            The personal Dexcom continuous glucose sensor report indicates an average blood     glucose of 161 mg/dL with a standard deviation of 71 mg/dL 55% of glucose levels    are within target range with 38% being above target and 7% being below target.      The patient is having some severe hypoglycemia at times.  The glucose pattern     appears to be well controlled during the overnight hours was some mild hypo-    glycemia at around 2 AM and then the patient has a dramatic increase in glucose     levels at around 5 AM with levels often exceeding 250 mg/dL the glucose will     then begin to fall at around 8 AM in the morning and the patient experiences     significant hypoglycemia between the hours of 8 AM and 11 AM.  Remainder the day    the patient has a better glucose control with some indications of postprandial     excursions.  The patient does states that she eats breakfast at around  5 or 6 AM    consistently on most days is so the hypoglycemia may be attributed to ins    ufficient carbohydrate coverage.  The hypoglycemia occurring between 8 AM and 11    AM may be contributing to the patient's increased activity when she goes to     work.            History            History: She denies any health issues or changes since last being seen.            Allergies/Medications      Allergies:        Coded Allergies:             SULFA (SULFONAMIDE ANTIBIOTICS) (Verified  Allergy, Unknown, RASH,     12/18/14)      Medications    Last Reconciled on 9/5/18 10:34 by JASPREET BATEMAN      Lactobacillus Rhamnosus  (Probiotic Digestive Care) 1 Each Capsule      1 EACH PO, CAP         Reported         1/5/18       Multivitamin (Multivitamins) 1 Each Capsule      1 EACH PO QDAY, CAP         Reported         1/5/18       Escitalopram Oxalate (Lexapro) 5 Mg Tablet      10 MG PO QDAY, TAB         Reported         1/5/18       Insulin Human Aspart (NovoLOG VIAL) 100 Unit/1 Ml Vial      35 UNITS SUBQ QDAY, #1 VIAL 0 Refills         Reported         1/5/18       Estrogens Conjugated (Premarin Vag Cream) 30 Gm Cream.appl      1 APL VAG Q 3rd Day, #1 TUBE         Reported         1/5/18       Pantoprazole (Protonix*) 20 Mg Tablet.dr      40 MG PO QDAY PRN for acid reflux, TAB         Reported         1/5/18       amLODIPine (amLODIPine) 10 Mg Tablet      10 MG PO QDAY, #30 TAB 0 Refills         Reported         1/5/18       Ranitidine HCl (Zantac*) 150 Mg Tablet      150 MG PO BID, #60 TAB 5 Refills         Prov: Lewis Burger         12/18/14       Artificial Tears (Systane) 1 Drop Drops      1 DROPS EYE EACH BID, ML         Reported         12/17/14       Lisinopril* (Lisinopril*) 40 Mg Tablet      40 MG PO QDAY, #30 TAB 0 Refills         Reported         12/17/14       Levothyroxine Sodium (Levoxyl*) 0.075 Mg Tablet      1 TAB PO QAM         Reported         12/16/11       Simvastatin (Zocor) 20 Mg Tablet      1  TAB PO QDAY         Reported         12/16/11            Quality Measures      Current yr A1c result 7-9%:  Yes            Impression      Type I diabetes with hypoglycemia            Plan            Because of the patient's early morning postprandial hyperglycemia followed by     early morning hypoglycemia the following changes were made to her pump settings:            The 4 AM carbohydrate ratio of 1-28 was reduced to 1-25 to correct for     postprandial hyperglycemia      The 9 AM carbohydrate ratio was reduced from 1-28-1-25 to also correct for     postprandial hyperglycemia but that time was patient back to 7 AM and at the     basal rate of 0.4-5 was reduced to 0.375 to prevent the early morning     hypoglycemia      The insulin 10 AM basal rate of 0.4-5 was changed to began at 11 AM            The patient is to monitor glucose levels carefully to see if the changes made     minimize the postprandial hyperglycemia as well as reduce the risk of the early     morning hypoglycemia.  The patient is also encouraged to use a temporary basal     rate if she finds herself more physically active at work in the early morning     hours.  No other changes were made at this time.  The patient be scheduled for     follow-up appointment in approximately 6 weeks for reevaluation.            Total time spent with patient was 10 minutes of which half of that time was     spent counseling the patient regarding reinforcement of hypoglycemia management            Patient Education      Yes            PREVENTION      Hx Influenza Vaccination:  Yes (2014)      Influenza Vaccine Declined:  No      2 or More Falls Past Year?:  No      Fall Past Year with Injury?:  No      Hx Pneumococcal Vaccination:  Yes      Encouraged to follow-up with:  PCP regarding preventative exams.                 Disclaimer: Converted document may not contain table formatting or lab diagrams. Please see [employer] [facility] for the authenticated  document.

## 2021-05-28 NOTE — PROGRESS NOTES
Patient: TULIO WATSON     Acct: [id number]     Report: #[id number]  UNIT #: [id number]     : 1950    Encounter Date:2020  PRIMARY CARE: EDUIN DEL CASTILLO  ***Signed***  --------------------------------------------------------------------------------------------------------------------  Encounter Date      2020            Reason for Visit      This patient was seen in the office today for follow-up evaluation for diabetes     medication management.  She is a 69-year-old female patient with a history of     uncontrolled type 1 diabetes.  She is currently managed on a tandem insulin pump    and a Dexcom continuous glucose sensor however the patient states that she has     been off of her sensor for approximately 1 week as she is waiting for a new     device to arrive.  Her insulin pump was uploaded and the report was reviewed for    14-day period of time.  The report indicates an average blood glucose of 215     mg/dL with a high of 450 and a low of 64 there is some sensor data available     prior to the patient going off sensor.  It indicates an average glucose of 178     mg/dL with a high of 400 and a low of 40.  It should be noted the CGM will not     report levels greater than 400 or less than 40 on the reports.  The CGM     indicates 54% of glucose levels are in target while 42% are above target and 4%     below target.  The patient is entering 6-7 glucose levels into her pump each     day.  And she is using approximately 28 units of insulin each day.  Glucose     levels are under better control in the early morning hours but rise throughout     the day especially in the mid day and evening.  There is no pattern of     hypoglycemia.  Episodes are occurring at random times.  The patient attributes     this to increased physical activity or overestimating carbohydrates.  Once the     patient receives the updated sensor we will be able to enable the basal IQ     feature of her tandem pump  which will help to prevent hypoglycemia.            Lab Results      The most recent A1c was collected on October 31, 2019 and was 7.3%.  We will     order updated labs prior to the patient's next appointment            Item Value  Date Time             Hemoglobin A1c 7.3 % H 10/31/19 0732             Urine Random Microalbumin <12.0 mg/L 10/31/19 0732            History            History: The patient states that she has been seeing a vocal  because of     her vocal cord paralysis and she is returning back to you available to see if     any other procedures can be done to help with her voice otherwise she denies any    health changes            Allergies/Medications      Allergies:        Coded Allergies:             SULFA (SULFONAMIDE ANTIBIOTICS) (Verified  Allergy, Unknown, RASH, 1/2/19)      Medications    Last Reconciled on 8/13/19 8:54 pm by JASPREET Bianchi-Fluticasone (Fluticasone 50 mcg) 16 Gm Spray.susp      1 PUFFS NARE EACH QDAY, #1 BOTTLE 0 Refills         Reported         1/2/19       Hctz (hydroCHLOROthiazide) 25 Mg Tablet      25 MG PO QDAY, #30 TAB 0 Refills         Reported         1/2/19       Lactobacillus Rhamnosus  (Probiotic Digestive Care) 1 Each Capsule      1 EACH PO, CAP         Reported         1/5/18       Multivitamin (Multivitamins) 1 Each Capsule      1 EACH PO QDAY, CAP         Reported         1/5/18       Insulin Human Aspart (novoLOG VIAL) 100 Unit/1 Ml Vial      UNITS SUBQ QDAY, #1 VIAL 0 Refills         Reported         1/5/18       Pantoprazole (Protonix) 20 Mg Tablet.dr      40 MG PO QDAY PRN for acid reflux, TAB         Reported         1/5/18       amLODIPine (amLODIPine) 10 Mg Tablet      10 MG PO QDAY, #30 TAB 0 Refills         Reported         1/5/18       Artificial Tears (Systane 0.3-0.4% Ophth) 1 Drop Drops      1 DROPS EYE EACH BID, ML         Reported         12/17/14       Lisinopril* (Lisinopril*) 40 Mg Tablet      40 MG PO QDAY, #30 TAB 0 Refills          Reported         12/17/14       Levothyroxine Sodium (Levoxyl*) 0.075 Mg Tablet      1 TAB PO QAM         Reported         12/16/11       Simvastatin (Zocor) 20 Mg Tablet      1 TAB PO QDAY         Reported         12/16/11            Quality Measures      Current yr A1c result 7-9%:  Yes      Neg ua mAlb this yr in chart:  Yes            Impression      Type 1 diabetes uncontrolled            Plan      Because of the afternoon and evening hyperglycemia following changes were made     to her pump settings:            The 11 AM basal rate of 0.35 was increased to 0.45      The 3 PM basal rate was 0.7 was increased to 0.8      The 4 PM basal rate of 0.75 was increased to 0.85      The 5 PM basal rate of 0.55 was increased to 0.65            The patient will contact our office immediately if the changes made today     because any significant hypoglycemia.  Likewise when she receives the updated     continuous glucose sensor she will contact our office so that we can connect the    device to her insulin pump and enable the basal IQ feature.  She will otherwise     be scheduled for a follow-up appointment in 3 months.            Total time spent with patient was 15 minutes of which half of that time was     spent counseling the patient regarding review of use of continuous glucose     sensor therapy            PREVENTION      Hx Influenza Vaccination:  Yes      Date Influenza Vaccine Given:  Nov 1, 2018      Influenza Vaccine Declined:  No      Hx Pneumococcal Vaccination:  Yes      Encouraged to follow-up with:  PCP regarding preventative exams.            Electronically signed by JASPREET BATEMAN  03/08/2020 16:08       Disclaimer: Converted document may not contain table formatting or lab diagrams. Please see [employer] [facility] for the authenticated document.

## 2021-05-28 NOTE — PROGRESS NOTES
Patient: TULIO WATSON     Acct: [id number]     Report: #[id number]  UNIT #: [id number]     : 1950    Encounter Date:2018  PRIMARY CARE: EDUIN DEL CASTILLO  ***Signed***  --------------------------------------------------------------------------------------------------------------------  Encounter Date      Aug 9, 2018            Reason for Visit      This patient presents today for insulin pump start.  She is a 67 year old     female with history of type 1 DM.  She has been previously managed on the     Medtronic pump but had received a new pump.  She is being started on the Tandem     insulin pump with a Dexcom continuous glucose sensor.            History            History: She denies any health issues or changes since last being seen.            Allergies/Medications      Allergies:        Coded Allergies:             SULFA (SULFONAMIDE ANTIBIOTICS) (Verified  Allergy, Unknown, RASH, 14    )      Medications    Last Reconciled on 18 16:07 by JASPREET BATEMAN      Lactobacillus Rhamnosus  (Probiotic Digestive Care) 1 Each Capsule      1 EACH PO, CAP         Reported         18       Multivitamin (Multivitamins) 1 Each Capsule      1 EACH PO QDAY, CAP         Reported         18       Escitalopram Oxalate (Lexapro) 5 Mg Tablet      10 MG PO QDAY, TAB         Reported         18       Insulin Human Aspart (NovoLOG VIAL) 100 Unit/1 Ml Vial      35 UNITS SUBQ QDAY, #1 VIAL 0 Refills         Reported         18       Estrogens Conjugated (Premarin Vag Cream) 30 Gm Cream.appl      1 APL VAG Q 3rd Day, #1 TUBE         Reported         18       Pantoprazole (Protonix*) 20 Mg Tablet.dr      40 MG PO QDAY Y for acid reflux, TAB         Reported         18       amLODIPine (amLODIPine) 10 Mg Tablet      10 MG PO QDAY, #30 TAB 0 Refills         Reported         18       Ranitidine HCl (Zantac*) 150 Mg Tablet      150 MG PO BID, #60 TAB 5 Refills         Prov:  Lewis Burger         12/18/14       Artificial Tears (Systane) 1 Drop Drops      1 DROPS EYE EACH BID, ML         Reported         12/17/14       Lisinopril* (Lisinopril*) 40 Mg Tablet      40 MG PO QDAY, #30 TAB 0 Refills         Reported         12/17/14       Levothyroxine Sodium (Levoxyl*) 0.075 Mg Tablet      1 TAB PO QAM         Reported         12/16/11       Simvastatin (Zocor*) 20 Mg Tablet      1 TAB PO QDAY         Reported         12/16/11            Impression      type 1 DM            Plan      At this time the new pump and sensor were started without difficulty.  Settings     from her previous device were used to establish the settings in the new one.      She will return for f/u in 2 weeks.  She is to call if she has any difficulties     with the pump or sensor.            PREVENTION      Hx Influenza Vaccination:  Yes (2014)      Influenza Vaccine Declined:  No      Hx Pneumococcal Vaccination:  Yes      Encouraged to follow-up with:  PCP regarding preventative exams.                 Disclaimer: Converted document may not contain table formatting or lab diagrams. Please see [employer] [facility] for the authenticated document.

## 2021-05-28 NOTE — PROGRESS NOTES
Patient: TULIO WATSON     Acct: [id number]     Report: #[id number]  UNIT #: [id number]     : 1950    Encounter Date:2018  PRIMARY CARE: EDUIN DEL CASTILLO  ***Signed***  --------------------------------------------------------------------------------------------------------------------  Encounter Date      2018            Reason for Visit      This patient returns to the office today for follow-up appointment for insulin     pump management.  She is a 67-year-old female patient with a history of type I     diabetes.  She is currently managed on a Medtronic insulin pump.  Her insulin     pump was uploaded for 2 week period of time and the records were reviewed with     the patient.            Pump records indicate an average blood glucose of 177 mg/dL plus or -76 mg/dL.      She is testing her blood sugar 7 times each day.  She is using approximately     2017 units of insulin a day.  He should is having problematic hyperglycemia in     the evening hours.  She is having episodes of hypoglycemia at times during the     day.  She feels like this is most likely due to increase physical activity on     some days.  Patient reports that she recently started back to work part-time     and is very busy with her new job.            The patient states she is also having difficulty getting her pump supplies from     her current supplier.  This is most likely due to her switching to Medicare.            History: She denies any health issues or changes since last being seen.            Impression: Type I diabetes uncontrolled            Total time spent with patient was 10 minutes of which half of that time was     spent counseling the patient regarding review of diet and physical activity and     its impact on glucose control.            Allergies/Medications      Allergies:        Coded Allergies:             SULFA (SULFONAMIDE ANTIBIOTICS) (Verified  Allergy, Unknown, RASH, 14    )       Medications    Last Reconciled on 2/20/18 10:50 by JASPREET BATEMAN      Lactobacillus Rhamnosus  (Probiotic Digestive Care) 1 Each Capsule      1 EACH PO, CAP         Reported         1/5/18       Multivitamin (Multivitamins) 1 Each Capsule      1 EACH PO QDAY, CAP         Reported         1/5/18       Escitalopram Oxalate (Lexapro) 5 Mg Tablet      10 MG PO QDAY, TAB         Reported         1/5/18       Insulin Human Aspart (NovoLOG VIAL) 100 Unit/1 Ml Vial      35 UNITS SUBQ QDAY, #1 VIAL 0 Refills         Reported         1/5/18       Estrogens Conjugated (Premarin Vag Cream) 30 Gm Cream.appl      1 APL VAG Q 3rd Day, #1 TUBE         Reported         1/5/18       Pantoprazole (Protonix*) 20 Mg Tablet.dr      40 MG PO QDAY Y for acid reflux, TAB         Reported         1/5/18       amLODIPine (amLODIPine) 10 Mg Tablet      10 MG PO QDAY, #30 TAB 0 Refills         Reported         1/5/18       Ranitidine HCl (Zantac*) 150 Mg Tablet      150 MG PO BID, #60 TAB 5 Refills         Prov: Lewis Burger         12/18/14       Artificial Tears (Systane) 1 Drop Drops      1 DROPS EYE EACH BID, ML         Reported         12/17/14       Lisinopril* (Lisinopril*) 40 Mg Tablet      40 MG PO QDAY, #30 TAB 0 Refills         Reported         12/17/14       Levothyroxine Sodium (Levoxyl*) 0.075 Mg Tablet      1 TAB PO QAM         Reported         12/16/11       Simvastatin (Zocor*) 20 Mg Tablet      1 TAB PO QDAY         Reported         12/16/11            Quality Measures      Current yr A1c less than 7%:  Yes      Current yr A1c result 7-9%:  No      Current yr A1c more than 9:  No      Pos ua mAlb this yr in chart:  No      Neg ua mAlb this yr in chart:  Yes            Plan      At this time because the patient is having persistent hyperglycemia in the     evening hours after her suppertime meal and changes were made to her pump     settings.  There is also adjustment made due to hypoglycemia during the midday     as  well.            12 AM to 4 AM basal rate of 0.65 was not changed      4 AM and 2 9 AM basal rate of 0.7 was not changed      9 AM to 3 PM basal rate of 0.4 was increased to 0.45      3 PM to 12 AM basal rate was changed to 3 PM to 5 PM with a rate of 0.5 which     was not changed      5 PM basal rate of 0.6 was added      4 PM to 12 AM carbohydrate ratio of 21 was changed to 20            The patient is to continue monitoring her glucose levels carefully to see if     the changes made today correct the evening hyperglycemia.  She is to call our     office if the changes cause any worsening frequency or severity of     hypoglycemia.  She'll be otherwise scheduled for routine follow-up appointment     in 3 months.            Hx Influenza Vaccination:  Yes (2014)      Hx Pneumococcal Vaccination:  Yes      Encouraged to follow-up with:  PCP regarding preventative exams.                 Disclaimer: Converted document may not contain table formatting or lab diagrams. Please see [facility] for the authenticated document.

## 2021-05-28 NOTE — PROGRESS NOTES
Patient: TULIO WATSON     Acct: [id number]     Report: #[id number]  UNIT #: [id number]     : 1950    Encounter Date:10/30/2019  PRIMARY CARE: EDUIN DEL CASTILLO  ***Signed***  --------------------------------------------------------------------------------------------------------------------  Encounter Date      Oct 30, 2019            Reason for Visit      This patient is seen in the office today for follow-up evaluation for insulin     pump management.  She is a 69-year-old female patient with a history of type 1     diabetes.  She is currently managed on a tandem insulin pump with continuous     glucose sensor.  Her device was uploaded and reviewed for 14-day period of time.            The pump record indicates an average blood glucose of 207 mg/dL with a high of     349 and a low of 52.  The sensor indicates an average of 180 mg/dL with a high     of 400 and a low of 40 however it should be noted the sensor will not report l    evels above 400 or below 40.  The report indicates that 44% of glucose levels     are within a target between 70 and 180 mg/dL while 48% are above target and 8%     are below target.  She is using approximately 30 units of insulin each day and     entering 2-3 glucose levels into the pump each day.  The blood glucose trend     indicates hypoglycemia occurring in the early morning hours at around 4:56 AM     with hyperglycemia occurring in the evenings at around 7:54 PM.  The continuous     glucose sensor likewise demonstrates this pattern however the hypoglycemia is     followed by hyperglycemia which is most likely due to overtreatment of the     hypoglycemia.  The patient also has some hyperglycemia during the noontime hours    but she has wide fluctuation in glucose levels during this time of day.            Lab Results      We will obtain blood work on this patient as there is no recent A1c available            History            History: She denies any health issues or  changes since last being seen.            Allergies/Medications      Allergies:        Coded Allergies:             SULFA (SULFONAMIDE ANTIBIOTICS) (Verified  Allergy, Unknown, RASH, 1/2/19)      Medications    Last Reconciled on 8/13/19 8:54 pm by JASPREET Bianchi-Fluticasone (Fluticasone 50 mcg) 16 Gm Spray.susp      1 PUFFS NARE EACH QDAY, #1 BOTTLE 0 Refills         Reported         1/2/19       Hctz (hydroCHLOROthiazide) 25 Mg Tablet      25 MG PO QDAY, #30 TAB 0 Refills         Reported         1/2/19       Lactobacillus Rhamnosus  (Probiotic Digestive Care) 1 Each Capsule      1 EACH PO, CAP         Reported         1/5/18       Multivitamin (Multivitamins) 1 Each Capsule      1 EACH PO QDAY, CAP         Reported         1/5/18       Insulin Human Aspart (novoLOG VIAL) 100 Unit/1 Ml Vial      UNITS SUBQ QDAY, #1 VIAL 0 Refills         Reported         1/5/18       Pantoprazole (Protonix*) 20 Mg Tablet.dr      40 MG PO QDAY PRN for acid reflux, TAB         Reported         1/5/18       amLODIPine (amLODIPine) 10 Mg Tablet      10 MG PO QDAY, #30 TAB 0 Refills         Reported         1/5/18       Artificial Tears (Systane 0.3-0.4% Ophth) 1 Drop Drops      1 DROPS EYE EACH BID, ML         Reported         12/17/14       Lisinopril* (Lisinopril*) 40 Mg Tablet      40 MG PO QDAY, #30 TAB 0 Refills         Reported         12/17/14       Levothyroxine Sodium (Levoxyl*) 0.075 Mg Tablet      1 TAB PO QAM         Reported         12/16/11       Simvastatin (Zocor) 20 Mg Tablet      1 TAB PO QDAY         Reported         12/16/11            Quality Measures      Current yr A1c result 7-9%:  Yes      Neg ua mAlb this yr in chart:  Yes            Impression      Type 1 diabetes uncontrolled            Plan      Because of the patient's early morning hypoglycemia the following changes were m    marcello to her pump settings:            The 4 AM basal rate of 0.575 was decreased to 0.50 and pushed back to begin at  3    AM      The 7 AM basal rate of 0.3 units/h was decreased to 0.2 units an hour      The 4 AM carbohydrate ratio of 19 was pushed back to begin at 3 AM due to the     patient's early morning schedule for work            In regards to the hyperglycemia the patient was encouraged to improve her     accuracy of carbohydrate counting so as to prevent hypoglycemia.  She was also     encouraged to use her temporary basal rate to prevent hypoglycemia when she has     increased physical activity that could lead to hypoglycemia.  No other changes     were made today.  The patient will be scheduled for follow-up appointment in 3     months.  If she continues to have problems in the interim she will call our     office to schedule an earlier appointment.            Total time spent with patient was 15 minutes of which half of that time was     spent counseling the patient regarding strategies to prevent both hyperglycemia     and hypoglycemia            PREVENTION      Hx Influenza Vaccination:  Yes      Date Influenza Vaccine Given:  Nov 1, 2018      Influenza Vaccine Declined:  No      2 or More Falls Past Year?:  No      Fall Past Year with Injury?:  No      Hx Pneumococcal Vaccination:  Yes      Encouraged to follow-up with:  PCP regarding preventative exams.            Electronically signed by JASPREET BATEMAN  01/25/2020 14:45       Disclaimer: Converted document may not contain table formatting or lab diagrams. Please see [facility] for the authenticated document.

## 2021-05-28 NOTE — PROGRESS NOTES
Patient: TULIO WATSON     Acct: [id number]     Report: #[id number]  UNIT #: [id number]     : 1950    Encounter Date:2020  PRIMARY CARE: EDUIN DEL CASTILLO  ***Signed***  --------------------------------------------------------------------------------------------------------------------  History of Present Illness            Chief Complaint: Type 1 diabetes            Tulio Watson is presenting for evaluation via Video and Audio conferencing.     Verbal consent obtained via Video and Audio before beginning the visit.            The following staff were present during the visit: JACKIE Perez                         Overview of Symptoms      This patient is evaluated via telehealth today for follow-up insulin pump     management.  She is a 70-year-old female patient with a history of type 1     diabetes.  The patient is currently managed with a tandem insulin pump and a     Dexcom continuous glucose sensor.  The pump could not be uploaded as this is a     telehealth visit however we were able to access her Dexcom by way of the     Internet.  Her Dexcom report indicates an average glucose of 153 mg/dL with a     standard deviation of 52 mg/dL.  59% of glucose levels are in a target range     between 70 and 180 while 36% are above target and 5% below target.  She is     having some episodes of hypoglycemia during the overnight hours between 1 AM and    3 AM.  She is also have any postprandial excursion in the evening after her     suppertime meal.            The patient states she was diagnosed with COVID-19 on .  She states     that she had headache chills and stomach issues as well as loss of taste and     smell.  During that timeframe her glucose levels ran a little higher but have     since then stabilized as she is recovered.            Allergies and Medications      Allergies:        Coded Allergies:             SULFA (SULFONAMIDE ANTIBIOTICS) (Verified  Allergy, Unknown, RASH,  1/2/19)      Medications    Last Reconciled on 11/11/20 2:41 pm by JASPREET BATEMAN      Escitalopram Oxalate (Escitalopram Oxalate*) 10 Mg Tablet      10 MG PO HS, TAB         Reported         4/29/20       amLODIPine (amLODIPine) 5 Mg Tablet      5 MG PO QDAY, #30 TAB         Reported         4/29/20       Hctz (hydroCHLOROthiazide) 25 Mg Tablet      25 MG PO QDAY, #30 TAB 0 Refills         Reported         1/2/19       Multivitamin (Multivitamins) 1 Each Capsule      1 EACH PO QDAY, CAP         Reported         1/5/18       Insulin Human Aspart (novoLOG VIAL) 100 Unit/1 Ml Vial      UNITS SUBQ QDAY, #1 VIAL 0 Refills         Reported         1/5/18       Pantoprazole (Protonix) 20 Mg Tablet.dr      40 MG PO QDAY PRN for acid reflux, TAB         Reported         1/5/18       Artificial Tears (Systane 0.3-0.4% Ophth) 1 Drop Drops      1 DROPS EYE EACH BID, ML         Reported         12/17/14       Lisinopril* (Lisinopril*) 40 Mg Tablet      40 MG PO QDAY, #30 TAB 0 Refills         Reported         12/17/14       Levothyroxine Sodium (Levoxyl*) 0.075 Mg Tablet      1 TAB PO QAM         Reported         12/16/11            Past Medical,Surg,Family Hx      Past Medical History:  Diabetes Type 1, High Cholesterol, Hypertension, Thyroid     Disease      Past Surgical History:  Cholecystectomy, Hysterectomy      Family History:  Type II Dm            Social History      Smoking status:  Never smoker            Review of Systems      General:  Admits: Fatigue (Some fatigue since having COVID-19 but is slowly     strengthening);          Denies: Appetite Change, Fever, Night Sweats, Weight Gain, Weight Loss      ENT:  Denies Headache, Denies Hearing Loss, Denies Hoarseness, Denies Sore     Throat      Eye:  Denies Blurred Vision, Denies Corrective Lenses, Denies Diplopia, Denies     Vision Changes      Cardiovascular:  Denies Chest Pain, Denies Palpitations      Respiratory:  Denies: Cough, Coughing Blood, Productive  Cough, Shortness of Air,    Wheezing      Gastrointestinal:  Denies Bloody Stools, Denies Constipation, Denies Diarrhea,     Denies Nausea/Vomiting, Denies Problem Swallowing, Denies Unable to Control     Bowels      Genitourinary:  Denies Blood in Urine, Denies Incontinence, Denies Painful     Urination      Musculoskeletal:  Denies Back Pain, Denies Muscle Pain, Denies Painful Joints      Integumentary:  Denies Itching, Denies Lesions, Denies Rash      Neurologic:  Denies Dizziness, Denies Numbness\Tingling, Denies Seizures      Psychiatric:  Denies Anxiety, Denies Depression      Endocrine:  Denies Cold Intolerance, Denies Heat Intolerance      Hematologic/Lymphatic:  Denies Bruising, Denies Bleeding, Denies Enlarged Lymph     Nodes            Most Recent Lab Findings      Most current A1c was collected in May of this year and was 7.8% indicating     uncontrolled type 1 diabetes            Item Value  Date Time             Hemoglobin A1c 7.8 % H 5/5/20 0810            Exam      Constitutional/Appearance:  Well Nourished, No Acute Distress      Head/Face:  Atraumatic      Eyes:  Extracocular move intact, No Scleral Icterus      Respiratory:  Breathing comfortably, No Cough      Skin: General Appearance:  No Visable Rashes on face, No Lesions on face      Neurologic Orientation:  Grossly orientated to Person, Place, No Facial Drop      Psychiatric:  Normal Mood, Normal Affect            Plan and Patient Instructions      Ambulatory Assessment/Plan:        TYPE 1 DIABETES MELLITUS WITH HYPERGLYCEMIA - E10.65            Notes      New Diagnostics      * Hemoglobin A1c, Routine         Dx: TYPE 1 DIABETES MELLITUS WITH HYPERGLYCEMIA - E10.65      Plan      To prevent the nocturnal hypoglycemia as well as the postprandial hyperglycemia     in the evening the following changes were made to her pump settings:            12 am basal rate of 0.6 was changed to 0.4      3 pm carb ratio of 1:25 we will collect a hemoglobin  A1c on this patient.            No other changes were made at this time.  The patient will monitor glucose     levels using her Dexcom continuous glucose sensor.  She will be scheduled for     follow-up appointment in 3 months.  If she has any problematic glucose levels     after the changes made today, she will contact our office immediately for     instructions.      Instructions      * Chronic conditions reviewed and taken into consideration for today's treatment      plan.      * Patient instructed to seek medical attention urgently for new or worsening       symptoms.      * Patient was educated/instructed on their diagnosis, treatment and medications       prior to discharge from the Video and Audio visit today.            Electronically signed by JASPREET BATEMAN  11/16/2020 13:42       Disclaimer: Converted document may not contain table formatting or lab diagrams. Please see [facility] for the authenticated document.

## 2021-05-28 NOTE — PROGRESS NOTES
Patient: TULIO WATSON     Acct: [id number]     Report: #[id number]  UNIT #: [id number]     : 1950    Encounter Date:2018  PRIMARY CARE: EDUIN DEL CASTILLO  ***Signed***  --------------------------------------------------------------------------------------------------------------------  Encounter Date      May 16, 2018            Reason for Visit      This patient returns to the office today for follow-up evaluation for insulin     pump management.  She is a 67-year-old female patient with a history of type I     diabetes uncontrolled.  She is currently managed on a Medtronic insulin pump.      Her insulin pump was uploaded for 14 day period of time and the records were     reviewed with the patient.            Pump records indicate an average glucose of 181 mg/dL plus or -92 mg/dL.  She     is testing her blood sugar 7-8 times each day.  She is using approximately 30     units of insulin each day.  Glucose levels are widely fluctuating.  There are     episodes of hypoglycemia in the morning at around 10 AM as well as around 2 PM     followed by significant hyperglycemia in the evening between 6 PM and 10 PM.      The patient attributes the hyperglycemia to feeling as if her carbohydrate     ratio is insufficient.  The hypoglycemia is sometimes occurring because of     increase physical activity at work.  The patient states that she tries to use a     temporary basal rate that sometimes she forgets and sometimes it just does not     work for her.            History            History: She denies any health issues or changes since last being seen.            Allergies/Medications      Allergies:        Coded Allergies:             SULFA (SULFONAMIDE ANTIBIOTICS) (Verified  Allergy, Unknown, RASH, 14    )      Medications    Last Reconciled on 18 16:07 by JASPREET BATEMAN      Lactobacillus Rhamnosus  (Probiotic Digestive Care) 1 Each Capsule      1 EACH PO, CAP         Reported          1/5/18       Multivitamin (Multivitamins) 1 Each Capsule      1 EACH PO QDAY, CAP         Reported         1/5/18       Escitalopram Oxalate (Lexapro) 5 Mg Tablet      10 MG PO QDAY, TAB         Reported         1/5/18       Insulin Human Aspart (NovoLOG VIAL) 100 Unit/1 Ml Vial      35 UNITS SUBQ QDAY, #1 VIAL 0 Refills         Reported         1/5/18       Estrogens Conjugated (Premarin Vag Cream) 30 Gm Cream.appl      1 APL VAG Q 3rd Day, #1 TUBE         Reported         1/5/18       Pantoprazole (Protonix*) 20 Mg Tablet.dr      40 MG PO QDAY Y for acid reflux, TAB         Reported         1/5/18       amLODIPine (amLODIPine) 10 Mg Tablet      10 MG PO QDAY, #30 TAB 0 Refills         Reported         1/5/18       Ranitidine HCl (Zantac*) 150 Mg Tablet      150 MG PO BID, #60 TAB 5 Refills         Prov: Lewis Burger         12/18/14       Artificial Tears (Systane) 1 Drop Drops      1 DROPS EYE EACH BID, ML         Reported         12/17/14       Lisinopril* (Lisinopril*) 40 Mg Tablet      40 MG PO QDAY, #30 TAB 0 Refills         Reported         12/17/14       Levothyroxine Sodium (Levoxyl*) 0.075 Mg Tablet      1 TAB PO QAM         Reported         12/16/11       Simvastatin (Zocor*) 20 Mg Tablet      1 TAB PO QDAY         Reported         12/16/11            Impression      Type I diabetes uncontrolled            Plan            Plan: Because of the patient's problematic hypoglycemia in the morning hours     and early afternoon the 9 AM to 3 PM basal rate of 0.45 was decreased to 0.35;     because of the late evening hyperglycemia the 5 PM to 12 AM basal rate of 0.6     was increased to 0.7 and the 4 PM carbohydrate ratio of 1-20 was changed to     ratio of 1-18            I feel this patient would benefit from an upgrade her insulin pump.  I think     she would benefit from the Medtronic 670 G pump with guardian sensor that     operates with auto mode.  Her pump is outside warranty in June therefore we      will process the paperwork for the pump upgrade.  The meantime the patient is     to monitor glucose carefully to see if the changes made today resolve the     problematic hypoglycemia and hyperglycemia.            Total time spent with patient was 10 minutes of which half of that time was     spent counseling the patient regarding insulin pump options            Patient Education      Yes            PREVENTION      Hx Influenza Vaccination:  Yes (2014)      Influenza Vaccine Declined:  No      2 or More Falls Past Year?:  No      Fall Past Year with Injury?:  No      Hx Pneumococcal Vaccination:  Yes      Encouraged to follow-up with:  PCP regarding preventative exams.                 Disclaimer: Converted document may not contain table formatting or lab diagrams. Please see [facility] [facility] for the authenticated document.

## 2021-05-28 NOTE — PROGRESS NOTES
Patient: TULIO WATSON     Acct: [id number]     Report: #[id number]  UNIT #: [id number]     : 1950    Encounter Date:2021  PRIMARY CARE: EDUIN DEL CASTILLO  ***Alexandru***  --------------------------------------------------------------------------------------------------------------------  Date of Encounter      2021            Chief Complaint      DIABETES MELLITUS TYPE I            Referring Providers/Copies To      Referring Provider:  EDUIN DEL CASTILLO      Copies To:   EDUIN DEL CASTILLO            Allergies      Coded Allergies:             SULFA (SULFONAMIDE ANTIBIOTICS) (Verified  Allergy, Unknown, RASH, 19)            Medications      Last Reconciled on 3/4/21 8:53 pm by JASPREET BATEMAN      Escitalopram Oxalate (Escitalopram Oxalate*) 10 Mg Tablet      10 MG PO HS, TAB         Reported         20       amLODIPine (amLODIPine) 5 Mg Tablet      5 MG PO QDAY, #30 TAB         Reported         20       Hctz (hydroCHLOROthiazide) 25 Mg Tablet      25 MG PO QDAY, #30 TAB 0 Refills         Reported         19       Multivitamin (Multivitamins) 1 Each Capsule      1 EACH PO QDAY, CAP         Reported         18       Insulin Human Aspart (novoLOG VIAL) 100 Unit/1 Ml Vial      UNITS SUBQ QDAY, #1 VIAL 0 Refills         Reported         18       Pantoprazole (Protonix) 20 Mg Tablet.dr      40 MG PO QDAY PRN for acid reflux, TAB         Reported         18       Artificial Tears (Systane 0.3-0.4% Ophth) 1 Drop Drops      1 DROPS EYE EACH BID, ML         Reported         14       Lisinopril* (Lisinopril*) 40 Mg Tablet      40 MG PO QDAY, #30 TAB 0 Refills         Reported         14       Levothyroxine Sodium (Levoxyl*) 0.075 Mg Tablet      1 TAB PO QAM         Reported         11            Vital Signs      Height 5 ft 2 in / 157.48 cm      Weight 139 lbs 4 oz / 63.359863 kg      BSA 1.64 m2      BMI 25.5 kg/m2      Temperature 97.5 F / 36.39 C - Temporal       Pulse 51      Respirations 16      Blood Pressure 135/62 Sitting, Right Arm      Pulse Oximetry 96%, room air      Blood Glucose Value:  150 (Dexcom)            Eye Exam      When was your last eye exam?:        2021      Where was it done?:        [facility]            Pain Score      Experiencing any pain?:  No            Preventative      Hx Influenza Vaccination:  Yes (2018)      Date Influenza Vaccine Given:  Nov 1, 2020      Influenza Vaccine Declined:  No      Have You Had 2 or More Falls i:  No      Have You Had a Fall with an In:  No      Hx Pneumococcal Vaccination:  Yes      Encouraged to follow-up with:  PCP regarding preventative exams.      Chart initiated by:      Vianey Aparicio MA            HPI - Diabetes      This patient is seen in the office today for follow-up evaluation for insulin     pump management.  She is a 70-year-old female patient with a history of type 1     diabetes complicated by diabetic retinopathy.  She is currently managed on a     tandem insulin pump with a Dexcom continuous glucose sensor.  Her devices were     uploaded and the records were reviewed with the patient.            The insulin pump report indicates an average glucose of 151 mg/dL with a high of    400 and a low of 40.  63% of glucose levels are within target range between 70     and 180 while 30% are above target and 7% are below target.  She is using     control IQ technology with her pump at 96% of the time.  The patient is using     approximately 30 units of insulin each day.  The reports indicate the patient is    having episodes of hypoglycemia but she attributes this to not indicating     increased physical activity on the pump with the control IQ feature.            The Dexcom continuous glucose sensor indicates an average glucose of 152 mg/dL     with 56% of glucose levels being in target range while 39% are above target and     7% below target.  She is having episodes of hypoglycemia in the early  morning     hours between 7 AM and 9 AM.  Again the patient states this is when she is     exercising in the morning and she is often forgetting to set the activity     exercise feature on during this timeframe.            Most Recent Lab Findings      Most Recent HGA1C      Her most recent A1c was collected on February 26, 2021 was  7.4% indicating     uncontrolled type 1 diabetes but this indicates improvement from the prior     result of 8.2% collected in November 2020.            Item Value  Date Time             Hemoglobin A1c 7.4 % H 2/26/21 1436             Hemoglobin A1c 8.2 % H 11/19/20 0942            Diabetes Type:  Type 1      Diabetes Complications:  Retinopathy      Number of Years?:  41      Hospitalizations 2nd to DM?:  No      ER/911 Secondary to DM:  No      Dietary Habits:  3 Meals daily, Carb Count, Diet Drinks      Exercise:  Regularly      BG Monitoring:  CGM (Dexcom)      Frequency:  Times per day (Continuous)      Blood Glucose Range:        Sensor glucose averages 152 mg/dL            PAST,FAMILY,   Past Medical History      Past Medical History:  Anxiety, Cancer, GERD, Hyperlipidemia, Hypertension,     Retinopathy, Thyroid Disease            Past Surgical History      Past Surgical History:  Carpal Tunnel Release, Cholecystectomy, Hysterectomy,     Thyroid Surgery            Past Family History      TYPE 2 DM            Social History      Lives independently:  No      Occupation:  Retired            Tobacco Use      Smoking status:  Never smoker            Alcohol Use      None            Substance Use      Substance Use:  Denies Use            Review of Sytems      General:  No Appetite Changes, No Fatigue, No Weight Loss, No Weight Gain, No     Weakness      Eyes:  No Blurred Vision, No Corrective Lenses, No Vision Changes, No Vision     Loss      Cardiovascular:  No Chest Pain, No Palpitations, No Peripheral edema      Gastrointestinal:  No Constipation, No Diarrhea, No Nausea/Vomiting       Integumentary:  No Lesions, No Rash, No Wounds      Neurologic:  No Extremity Pain, No Numbness, No Tingling, No Headache, No     Dizziness      Psychiatric:  No Anxiety, No Depression, No Stress      Endocrine:  Hypoglycemia; No Hypo Unawareness, No Nocturnal Hypo, No Polyuria,     No Polyphagia, No Polydipsia      ENT:  No Hearing loss, No Sinusitis, No Difficulty swallowing      Respiratory:  No Shortness of breath, No Wheezing, No Cough      Genitourinary:  No UTI, No Vaginal yeast infections, No Difficulty urinating, No    Incontinence      Musculoskeletal:  No Joint pain, No Muscle pain, No Back pain            Exam      Constitutional:  Awake and Alert, Appropriately dressed/groomed; Not Acutely     Distressed      Eyes:  No Scleral Icterus; Intact EOM; No Eyelid swelling      Cardiovascular:  No Peripheral Edema; Normal Chest Appearance      Musculoskeletal:  Steady Gait, Normal Strength, Normal ROM,       Respiratory:  No Respiratory Distress, No Cyanosis, No Accessory Muscle use      Skin:  No Blisters, No Cuts\Scrapes, No Acanthosis Nigricans, No DM Dermopathy,     No Fungus, No NLD, No Rash, No Vitiligo      Psychiatric:  Appropriate Affect, Intact Judgement, Oriented x 3,       ENMT:  Nose/ears normal, Normal hearing      Extremities:  No Cyanosis, No Edema; Normal temperature; No Deformity            Impression      Type 1 diabetes uncontrolled with retinopathy, anxiety, GERD, hyperlipidemia,     hypertension, hypothyroid            Diagnosis            Type 1 diabetes mellitus with hyperglycemia - E10.65            Notes      New Diagnostics      * Hemoglobin A1c, Routine         Dx: Type 2 diabetes mellitus with hyperglycemia - E11.65            Plan      No changes were made to the patient's pump settings.   we reviewed the process     for initiating the activity setting in her pump to prevent hypoglycemia during     exercise.  The patient is strongly encouraged to use this setting when she is      exercising in the morning.  No changes were made to her pump settings.  She will    continue to monitor her glucose levels using her continuous glucose sensor.  If     she continues to have hypoglycemia despite use of the activity setting, she will    contact our office so adjustments can be made to her basal rates.  She will be     scheduled for a follow-up appointment in 3 months.  We will collect a hemoglobin    A1c prior to her follow-up appointment.            Pain Plan      Pain Zero Today            Electronically signed by JASPREET BATEMAN  03/04/2021 20:54       Disclaimer: Converted document may not contain table formatting or lab diagrams. Please see [employer] for the authenticated document.

## 2021-05-28 NOTE — PROGRESS NOTES
Patient: TULIO WATSON     Acct: [id number]     Report: #[id number]  UNIT #: [id number]     : 1950    Encounter Date:2020  PRIMARY CARE: EDUIN DEL CASTILLO  ***Alexandru***  --------------------------------------------------------------------------------------------------------------------  Date of Encounter      2020            Chief Complaint      DIABETES MELLITUS TYPE I            Referring Providers/Copies To      Referring Provider:  EDUIN DEL CASTILLO      Copies To:   EDUIN DEL CASTILLO            Allergies      Coded Allergies:             SULFA (SULFONAMIDE ANTIBIOTICS) (Verified  Allergy, Unknown, RASH, 19)            Medications      Last Reconciled on 5/3/20 11:40 pm by JASPREET BATEMAN      Escitalopram Oxalate (Escitalopram Oxalate*) 10 Mg Tablet      10 MG PO HS, TAB         Reported         20       amLODIPine (amLODIPine) 5 Mg Tablet      5 MG PO QDAY, #30 TAB         Reported         20       Arias-Fluticasone (Fluticasone 50 mcg) 16 Gm Spray.susp      1 PUFFS NARE EACH QDAY, #1 BOTTLE 0 Refills         Reported         19       Hctz (hydroCHLOROthiazide) 25 Mg Tablet      25 MG PO QDAY, #30 TAB 0 Refills         Reported         19       Lactobacillus Rhamnosus  (Probiotic Digestive Care) 1 Each Capsule      1 EACH PO, CAP         Reported         18       Multivitamin (Multivitamins) 1 Each Capsule      1 EACH PO QDAY, CAP         Reported         18       Insulin Human Aspart (novoLOG VIAL) 100 Unit/1 Ml Vial      UNITS SUBQ QDAY, #1 VIAL 0 Refills         Reported         18       Pantoprazole (Protonix) 20 Mg Tablet.dr      40 MG PO QDAY PRN for acid reflux, TAB         Reported         18       Artificial Tears (Systane 0.3-0.4% Ophth) 1 Drop Drops      1 DROPS EYE EACH BID, ML         Reported         14       Lisinopril* (Lisinopril*) 40 Mg Tablet      40 MG PO QDAY, #30 TAB 0 Refills         Reported         14        Levothyroxine Sodium (Levoxyl*) 0.075 Mg Tablet      1 TAB PO QAM         Reported         12/16/11            Vital Signs      Height 5 ft 2 in / 157.48 cm      Weight 137 lbs 5 oz / 62.595779 kg      BSA 1.63 m2      BMI 25.1 kg/m2      Pulse 52      Respirations 16      Blood Pressure 117/71 Sitting, Right Arm      Pulse Oximetry 99%, ROOM AIR            Eye Exam      When was your last eye exam?:        Next Week      Where was it done?:        [facility]            Pain Score      Experiencing any pain?:  No            Preventative      Hx Influenza Vaccination:  Yes (2018)      Date Influenza Vaccine Given:  Nov 1, 2019      Influenza Vaccine Declined:  No      Have You Had 2 or More Falls i:  No      Have You Had a Fall with an In:  Yes      Hx Pneumococcal Vaccination:  Yes      Encouraged to follow-up with:  PCP regarding preventative exams.      Chart initiated by:      Vianey Aparicio MA            HPI - Diabetes      This patient is seen in the office today for follow-up evaluation for insulin     pump management.  She is a 69-year-old female patient with a history of type 1     diabetes.  Patient is currently managed using a tandem insulin pump.  The     patient has been attempting to update her pump to the newest software and has     been unable to get it to upload.  Today, when we attempted to upload her pump     for settings review we were also unable to upload her device.  The patient was     instructed to contact the pump supplier to see if her device needs to be     replaced.  She is also managed using Dexcom continuous glucose sensor and we     were able to upload this device.            The continuous glucose sensor indicates an average glucose of 159 mg/dL with a     standard deviation of 69 mg/dL.  53% of glucose levels are in target while 40%     are above target and 7% below target.  The patient states that she has been     exercising and she forgets to use the temporary basal rate  setting on her device    with exercise.  There are some episodes of hypoglycemia occurring in the evening    after 6 PM and extending through the overnight hours until around 3 AM.            Most Recent Lab Findings      Most Recent HGA1C      Her most recent A1c was collected on May 5, 2020 and was 7.8% indicating     uncontrolled type 1 diabetes.            Item Value  Date Time             Hemoglobin A1c 7.8 % H 5/5/20 0810            Random urine microalbumin was collected on May 5, 2020 and was within normal     limits            Item Value  Date Time             Urine Random Microalbumin <12.0 mg/L 5/5/20 0810            Diabetes Type:  Type 1      Diabetes Complications:  Retinopathy (some questionable retinopathy; she denies     any changes to this condition today)      Number of Years?:  41      Hospitalizations 2nd to DM?:  No      ER/911 Secondary to DM:  No      Dietary Habits:  3 Meals daily, Diet Drinks      Exercise:  Regularly (been doing aerobics for 2 weeks now)      BG Monitoring:  CGM (Dexcom)      Frequency:  Times per day (Continuous)      Blood Glucose Range:        Sensor glucose averages 159 mg/dL            PAST,FAMILY,   Past Medical History      Past Medical History:  Anxiety, Cancer (skin cancer on nose), GERD,     Hyperlipidemia, Hypertension, Retinopathy, Thyroid Disease (hypothyroid)            Past Surgical History      Past Surgical History:  Carpal Tunnel Release (bilateral), Cholecystectomy,     Hysterectomy, Thyroid Surgery (thyroidectomy)      Other Past Surgical History      vocal cord implant            Past Family History      TYPE 2 DM (brothers)            Social History      Marrital Status:        Lives independently:  No      Number of Children:  2      Occupation:  Retired            Tobacco Use      Smoking status:  Never smoker            Vaping      Currently Vaping:  No            Alcohol Use      None            Substance Use      Substance Use:  Denies Use             Review of Sytems      General:  No Appetite Changes, No Fatigue, No Weight Loss; Weight Gain (3-4     pound weight gain)      Eyes:  Negative for Blurred Vision, Negative for Corrective Lenses; Positive for    Vision Changes      Cardiovascular:  No Chest Pain, No Palpitations      Gastrointestinal:  No Constipation, No Diarrhea, No Nausea/Vomiting      Integumentary:  No Lesions, No Rash, No Wounds      Neurologic:  No Extremity Pain, No Numbness, No Tingling      Psychiatric:  No Anxiety, No Depression      Endocrine:  Hypoglycemia (Primarily occurring after exercise); No Hypo     Unawareness, No Nocturnal Hypo, No Polyuria, No Polyphagia, No Polydipsia            Exam      General:  Awake and Alert, Appropriately dressed/groomed, No Acute Distress,       Eyes:  Sclera Non-Icteric, EOM Intact, Eyelids without swelling,       Cardiovascular:  No Peripheral Edema, Normal Chest Appearance, Hear Tones Normal    S1 S2,       Musculoskeletal:  Steady Gait, Normal Strength, Normal ROM,       Respiratory:  No Respiratory Distress, No Cyanosis, No use of Accessory Musc,       Skin:  No Blisters, No Cuts\Scrapes, No Acanthosis Nigricans, No DM Dermopathy,     No Fungus, No NLD, No Rash, No Vitiligo      Psychiatric:  Appropriate Affect, Intact Judgement, Oriented x 3,             Impression      Type 1 diabetes uncontrolled with diabetic retinopathy, anxiety, GERD,     hypertension, hyperlipidemia, hypothyroidism            Diagnosis      TYPE 1 DIABETES MELLITUS WITH HYPERGLYCEMIA - E10.65            Plan      To overcome in the evening and overnight hypoglycemia the following changes were    made to her pump settings:            The 5 PM to 12 AM basal rate of 0.55 was increased to 0.65      The 12 AM to 3 AM basal rate was 0.7 was increased to 0.8            In regards to the hypoglycemia the patient was reinstructed on the use of the     temporary basal rate feature of the pump to use before, during, and  after     exercise as needed.  Additionally, the patient was encouraged to contact the     pump manufacture to determine the cause of the software issues with her device.     We will schedule her for routine follow-up appointment in 3 months otherwise.            Pain Plan      Pain Zero Today            Patient Education      Patient Education Provided:  Yes (use of temporary basal rate on pump for     exercise)            Electronically signed by JASPREET BATEMAN  08/17/2020 09:52       Disclaimer: Converted document may not contain table formatting or lab diagrams. Please see [employer] [facility] for the authenticated document.

## 2021-05-28 NOTE — PROGRESS NOTES
Patient: TULIO WATSON     Acct: [id number]     Report: #[id number]  UNIT #: [id number]     : 1950    Encounter Date:2018  PRIMARY CARE: EDUIN DEL CASTILLO  ***Signed***  --------------------------------------------------------------------------------------------------------------------  Reason for Visit      This patient returns to the office today for follow-up evaluation for insulin     pump management.  She is a 67-year-old female patient with a history of type I     diabetes.  She is currently managed on a Medtronic insulin pump.  Insulin pump     was downloaded for 2 week period of time and the records were reviewed with the     patient.            Records indicate some wildly fluctuating glucose levels.  Average blood glucose     is 187+ or -85 mg/dL.  She is testing her blood sugar 8 times each day.  She is     using an average of 32 units of insulin per day.  There is a pattern of     hypertension in the morning hours at around 7:54 AM followed by hypoglycemia     occurring between 11 AM and 4 PM and then hyperglycemia occurring in the     evenings after 4 PM.  The patient attributes the midday hypoglycemia to     increase exercise.  She is recently retired and has started walking on the     treadmill and doing an exercise video to maintain her weight and health.  This     is causing some of the episodes of hypoglycemia.  Some of the hyperglycemia in     the late afternoon may be due to over correction of the hypoglycemia.  The     patient was instructed to use a temporary basal rate reduction prior to     exercising to reduce the risk of hypoglycemia.            History: She denies any health issues or changes since last being seen.            Impression: Type I diabetes controlled            Total time spent with patient was 15 minutes of which half of that time was     spent counseling the patient regarding use of temporary basal rate and     strategies to reduce the risk of hypoglycemia.             Allergies/Medications      Allergies:        Coded Allergies:             SULFA (SULFONAMIDE ANTIBIOTICS) (Verified  Allergy, Unknown, RASH, 12/18/14    )      Medications    Last Reconciled on 1/5/18 11:38 by JASPREET BATEMAN      Lactobacillus Rhamnosus  (Probiotic Digestive Care) 1 Each Capsule      1 EACH PO, CAP         Reported         1/5/18       Multivitamin (Multivitamins) 1 Each Capsule      1 EACH PO QDAY, CAP         Reported         1/5/18       Escitalopram Oxalate (Lexapro) 5 Mg Tablet      10 MG PO QDAY, TAB         Reported         1/5/18       Insulin Human Aspart (NovoLOG VIAL) 100 Unit/1 Ml Vial      35 UNITS SUBQ QDAY, #1 VIAL 0 Refills         Reported         1/5/18       Estrogens Conjugated (Premarin Vag Cream) 30 Gm Cream.appl      1 APL VAG Q 3rd Day, #1 TUBE         Reported         1/5/18       Pantoprazole (Protonix*) 20 Mg Tablet.dr      40 MG PO QDAY Y for acid reflux, TAB         Reported         1/5/18       amLODIPine (amLODIPine) 10 Mg Tablet      10 MG PO QDAY, #30 TAB 0 Refills         Reported         1/5/18       Ranitidine HCl (Zantac*) 150 Mg Tablet      150 MG PO BID, #60 TAB 5 Refills         Prov: CelianancyLewis         12/18/14       Artificial Tears (Systane) 1 Drop Drops      1 DROPS EYE EACH BID, ML         Reported         12/17/14       Lisinopril* (Lisinopril*) 40 Mg Tablet      40 MG PO QDAY, #30 TAB 0 Refills         Reported         12/17/14       Levothyroxine Sodium (Levoxyl*) 0.075 Mg Tablet      1 TAB PO QAM         Reported         12/16/11       Simvastatin (Zocor*) 20 Mg Tablet      1 TAB PO QDAY         Reported         12/16/11            Quality Measures      Current yr A1c less than 7%:  Yes      Current yr A1c result 7-9%:  No      Current yr A1c more than 9:  No      Pos ua mAlb this yr in chart:  No      Neg ua mAlb this yr in chart:  Yes            Plan      At this time the patient was counseled on using the temporary basal rate      feature of her pump to reduce the risk of hypoglycemia due to her exercise     regimen.  In the meantime changes were made to her pump settings to minimize     the early morning and late evening hyperglycemia as well as to reduce the risk     of the midday hypoglycemia.  This changes are as follows:            12 AM basal rate of 0.575 was increased to 0.65      4 AM basal rate of 0.6 was increased to 0.7      9 AM basal rate reduction to 0.4 units per hour was added      3 PM basal rate of 0.5 was not changed      9 PM basal rate of 0.4 was deleted            The patient is cautioned to monitor glucose carefully to see if the changes     made today improve both the hyperglycemia and hypoglycemia.  She'll be     scheduled for follow-up appointment in 1 month.  In the meantime the patient     recently transitioned to Medicare for her insurance and we will collect a C-    peptide with fasting glucose to ensure she qualifies as a type I patient for     her pump supplies.  A prescription for insulin was provided at this appointment     as well            Hx Influenza Vaccination:  Yes (2014)      Hx Pneumococcal Vaccination:  Yes      Encouraged to follow-up with:  PCP regarding preventative exams.                 Disclaimer: Converted document may not contain table formatting or lab diagrams. Please see [employer] [facility] for the authenticated document.

## 2021-05-28 NOTE — PROGRESS NOTES
Patient: TULIO WATSON     Acct: [id number]     Report: #[id number]  UNIT #: [id number]     : 1950    Encounter Date:2019  PRIMARY CARE: EDUIN DEL CASTILLO  ***Signed***  --------------------------------------------------------------------------------------------------------------------  Encounter Date      2019            Reason for Visit      This patient is seen in the office today for follow-up evaluation for insulin     pump management.  She is a 68-year-old female patient with a history of type 1     diabetes.  She is currently managed on a tandem insulin pump with a Dexcom     continuous glucose sensor.  Her devices were uploaded and the records were     reviewed with the patient.            The pump record indicates an average blood glucose of 221 mg/dL with a high of     395 and a low of 68.  She is testing her blood sugar 4 times each day.  She is     using 32 units of insulin each day.  The continuous glucose sensor indicates an     average sensor glucose of 174 mg/dL with a high of 393 and a low of 40 mg/dL.      Glucose levels are widely fluctuating throughout the days and nights during this    14-day period of time.            The patient states that she has been traveling over the last 2 weeks and was     actually off of her sensor for over a week.  She returned home on  and     resumed her sensor at that time.  She states that she was eating irregularly and    had varying degrees of activity while traveling and she feels like this is the     cause of her widely fluctuating glucose levels.            History            History: She denies any health issues or changes since last being seen.            Allergies/Medications      Allergies:        Coded Allergies:             SULFA (SULFONAMIDE ANTIBIOTICS) (Verified  Allergy, Unknown, RASH, 19)      Medications    Last Reconciled on 19 8:54 pm by JASPREET Bianchi-Fluticasone (Fluticasone 50 mcg) 16 Gm  Spray.susp      1 PUFFS NARE EACH QDAY, #1 BOTTLE 0 Refills         Reported         1/2/19       Hydrochlorothiazide (Hydrochlorothiazide*) 25 Mg Tablet      25 MG PO QDAY, #30 TAB 0 Refills         Reported         1/2/19       Lactobacillus Rhamnosus  (Probiotic Digestive Care) 1 Each Capsule      1 EACH PO, CAP         Reported         1/5/18       Multivitamin (Multivitamins) 1 Each Capsule      1 EACH PO QDAY, CAP         Reported         1/5/18       Insulin Human Aspart (NovoLOG VIAL) 100 Unit/1 Ml Vial      UNITS SUBQ QDAY, #1 VIAL 0 Refills         Reported         1/5/18       Pantoprazole (Protonix*) 20 Mg Tablet.dr      40 MG PO QDAY PRN for acid reflux, TAB         Reported         1/5/18       amLODIPine (amLODIPine) 10 Mg Tablet      10 MG PO QDAY, #30 TAB 0 Refills         Reported         1/5/18       Artificial Tears (Systane 0.3-0.4% Ophth) 1 Drop Drops      1 DROPS EYE EACH BID, ML         Reported         12/17/14       Lisinopril* (Lisinopril*) 40 Mg Tablet      40 MG PO QDAY, #30 TAB 0 Refills         Reported         12/17/14       Levothyroxine Sodium (Levoxyl*) 0.075 Mg Tablet      1 TAB PO QAM         Reported         12/16/11       Simvastatin (Zocor) 20 Mg Tablet      1 TAB PO QDAY         Reported         12/16/11            Quality Measures      Current yr A1c result 7-9%:  Yes      Neg ua mAlb this yr in chart:  Yes            Impression      Type 1 diabetes uncontrolled            Plan            Because the patient's widely fluctuating glucose levels are mostly attributed to    the patient's recent travel and changes in diet and activity we elected to make     no changes to the patient's pump settings at this visit.  The patient is using     the Dexcom G5 sensor and I would like to evaluate the possibility of upgrading     her to a G6 so that she can use the basal IQ feature on her insulin pump to     prevent hypoglycemia.  I will send documentation to the supplier to see if  she     is eligible for this upgrade.  The patient will be scheduled for routine follow-    up appointment in 3 months unless the upgrade is approved and we will have her     come in so that we can initiate the basal IQ feature.            Total time spent with patient was 10 minutes of which half of that time was     spent counseling the patient regarding advanced pump features            PREVENTION      Hx Influenza Vaccination:  Yes      Date Influenza Vaccine Given:  Nov 1, 2018      Influenza Vaccine Declined:  No      Hx Pneumococcal Vaccination:  Yes      Encouraged to follow-up with:  PCP regarding preventative exams.                 Disclaimer: Converted document may not contain table formatting or lab diagrams. Please see [employer] for the authenticated document.

## 2021-06-05 NOTE — PROGRESS NOTES
Yohana Casillas  1950     Office/Outpatient Visit    Visit Date: Wed, May 26, 2021 03:40 pm    Provider: Shanthi Caceres N.P. (Assistant: Kathy Ayala,  )    Location: [facility]        Electronically signed by Shanthi Caceres N.P. on  05/26/2021 07:40:55 PM                             Subjective:        CC: PT NOT TAKING CRESTOR, PROMETHAZINE, ZYRTEC OR PREMARIN Ms. Casillas is a 70 year old White female.  Pt present with stomach pain that has been present 1-2 weeks. Pt notes nausea. PT also state having increased gas.;         HPI:           Patient to be evaluated for unspecified abdominal pain.  This is located primarily in the epigastric region.  It began 2 weeks ago.  Associated symptoms include nausea, gas, belching and bloating.  taking PPI, protonix     ROS:     CONSTITUTIONAL:  Negative for fever.      CARDIOVASCULAR:  Negative for chest pain, palpitations, tachycardia, orthopnea, and edema.      RESPIRATORY:  Negative for recent cough and dyspnea.      GASTROINTESTINAL:  Negative for constipation, diarrhea and melena.      NEUROLOGICAL:  Negative for dizziness, headaches, paresthesias, and weakness.      ENDOCRINE:  Positive for average blood sugars 110 /seeing   later this week.          Past Medical History / Family History / Social History:         Last Reviewed on 5/26/2021 03:50 PM by Shanthi Caceres    Past Medical History:                 PAST MEDICAL HISTORY         Hyperlipidemia: Hypercholesterolemia;     Hypertension     Type 1 Diabetes: controlled;     retinopathy, now being monitored, per Dr. Johnny MCKEON +  Hospitalizations: uti and drug reaction , at [facility]         CURRENT MEDICAL PROVIDERS:    Cardiologist: Reese    Urologist: dr sara Key NP/CDE         PREVENTIVE HEALTH MAINTENANCE             BONE DENSITY: was last done 2010 osteopenia     COLORECTAL CANCER SCREENING: colonoscopy with normal results     Hepatitis C  Medicare Screening: was last done      MAMMOGRAM: was last done 2016 with the following abnormalaties noted-- required more images on left breast     PAP SMEAR: hysterectomy         Surgical History:         Hysterectomy: Partial;     thyroidplasty 11     Thyroidectomy: small portion remains;     Bilateral Tubal Ligation    Bilateral Carpal Tunnel;    right index finger, trigger release and right wrist cyst removed 11; Procedures: colonoscopy 2004     Cataract Removal: bilateral; 9&10- 2016;     Cholecystectomy: laparoscopic; 19;     Procedures:    Colonoscopy ( 14 )    EGD ( 14 ) left vocal cord surgery 19         Family History:         Positive for Hypertension ( brother ).      Positive for Lung Cancer ( father; mother ).      Positive for Seizure Disorder ( brother ).  Father:  at age 63; Cause of death was lung cancer     Mother:  at age 69; Cause of death was adrenal cancer;  Lung Cancer         Social History:     Occupation: [employer] de[employer] 3-4 days a week     Marital Status:      Children: 2 children         Tobacco/Alcohol/Supplements:     Last Reviewed on 2021 03:45 PM by Kathy Ayala    Tobacco: She has never smoked.  Non-drinker         Immunizations:     influenza, high-dose, quadrivalent (FLUZONE HIGH-DOSE QUAD ) 2020    Fluzone (3 + years dose) 10/17/2011    Fluzone Quadrivalent (3+ years) 11/10/2016    Influenza A (H1N1) pf, IM (3+ years) Monovalent 2009    Fluzone High-Dose pf (>=65 yr) 2017    Fluzone High-Dose pf (>=65 yr) 10/23/2019    Pneumococcal, 23-valent IM/SC (adult and pt >=2yr) 2009    Adacel (Tdap) 2012    Zostavax (Zoster live) 2013    COVID-19, mRNA, LNP-S, PF, 30 mcg/0.3 mL dose 2020    COVID-19, mRNA, LNP-S, PF, 30 mcg/0.3 mL dose 2021        Allergies:     Last Reviewed on 2021 03:42 PM by Kathy Ayala    Aspirin: Stomach pain  (Adverse Reaction)     Sulfas:      Levaquin:          Current Medications:     Last Reviewed on 5/26/2021 03:42 PM by Kathy Ayala    Lisinopril 40mg Tablet [Take 1 tablet(s) by mouth daily]    levothyroxine 75 mcg oral tablet [TAKE ONE TABLET BY MOUTH DAILY]    aspirin 81 mg oral tablet,chewable [Chew 1 tablet(s) by mouth qam]    amLODIPine 10 mg oral tablet [1 / 2 tab daily]    Novalog insulin to carb ratio     Fluticasone Propionate 50mcg/1actuation Nasal Spray [1 or 2 sprays in each nostril qday ]    hydroCHLOROthiazide 25 mg oral tablet [1 tab daily]    escitalopram oxalate 10 mg oral tablet [TAKE ONE TABLET BY MOUTH DAILY]    guaiFENesin 600 mg oral Tablet,Extended Release 12 hr [take 1 tablet (600 mg) by oral route every 12 hours]    simvastatin 20 mg oral tablet [take 1 tablet (20 mg) by oral route once daily in the evening]        Objective:        Vitals:         Current: 5/26/2021 3:42:17 PM    Ht:  5 ft, 2.75 in;  Wt: 137.4 lbs;  BMI: 24.5T: 96.9 F (temporal);  BP: 133/70 mm Hg (left arm, sitting);  P: 56 bpm (left arm (BP Cuff), sitting);  sCr: 0.77 mg/dL;  GFR: 66.19        Exams:     PHYSICAL EXAM:     GENERAL: vital signs recorded - well developed, well nourished;  no apparent distress;     NECK: carotid exam reveals no bruits;     RESPIRATORY: normal respiratory rate and pattern with no distress; normal breath sounds with no rales, rhonchi, wheezes or rubs;     CARDIOVASCULAR: normal rate; rhythm is regular;  no systolic murmur; no edema;     GASTROINTESTINAL: mild epigastric tenderness;  normal bowel sounds; no organomegaly;     PSYCHIATRIC:  appropriate affect and demeanor; normal speech pattern; grossly normal memory;         Assessment:         R10.9   Unspecified abdominal pain       E10.9   Type 1 diabetes mellitus without complications           ORDERS:         Meds Prescribed:       [New Rx] promethazine 25 mg oral tablet [take 1/2 -  1 tablet (25 mg) by oral route every 4-6 hours as needed prn], #20 (twenty)  tablets, Refills: 0 (zero)         Lab Orders:       04761  A1CEG - [facility] Hemoglobin A1C  (Send-Out)            84129  BDCBC - [facility] CBC with 3 part diff  (Send-Out)            74665  COMP - [facility] Comp. Metabolic Panel  (Send-Out)            95517  HPUBT - [facility] H.pylori Breath test  (Send-Out)                      Plan:         Unspecified abdominal painreviewed ultrasound  and the op report from her gall bladder surgery in 1-2019    LABORATORY:  Labs ordered to be performed today include CBC, Comprehensive metabolic panel, and H.pylori urea breath test ([facility]).      RECOMMENDATIONS given include: clear liquid to bland diet, stay well hydrated.      FOLLOW-UP: pending labs           Prescriptions:       [New Rx] promethazine 25 mg oral tablet [take 1/2 -  1 tablet (25 mg) by oral route every 4-6 hours as needed prn], #20 (twenty) tablets, Refills: 0 (zero)           Orders:       [id number]  BDCBC - [facility] CBC with 3 part diff  (Send-Out)            69855  COMP - [facility] Comp. Metabolic Panel  (Send-Out)            62817  HPUBT - [facility] H.pylori Breath test  (Send-Out)              Type 1 diabetes mellitus without complicationswill fwd labs to     LABORATORY:  Labs ordered to be performed today include HgbA1C.            Orders:       [id number]  A1CEG - [facility] Hemoglobin A1C  (Send-Out)                  Charge Capture:         Primary Diagnosis:     R10.9  Unspecified abdominal pain           Orders:      [id number]  Office/outpatient visit; established patient, level 4  (In-House)              E10.9  Type 1 diabetes mellitus without complications

## 2021-06-06 VITALS
BODY MASS INDEX: 25.4 KG/M2 | TEMPERATURE: 97.1 F | HEIGHT: 62 IN | WEIGHT: 138.06 LBS | DIASTOLIC BLOOD PRESSURE: 56 MMHG | HEART RATE: 57 BPM | OXYGEN SATURATION: 97 % | RESPIRATION RATE: 16 BRPM | SYSTOLIC BLOOD PRESSURE: 106 MMHG

## 2021-06-06 NOTE — PROGRESS NOTES
Patient: TULIO WATSON     Acct: [id number]     Report: #[id number]  UNIT #: [id number]     : 1950    Encounter Date:2021  PRIMARY CARE: EDUIN DEL CASTILLO  ***Alexandru***  --------------------------------------------------------------------------------------------------------------------  Date of Encounter      May 28, 2021            Chief Complaint      DIABETES MELLITUS TYPE I            Referring Providers/Copies To      Referring Provider:  EDUIN DEL CASTILLO      Copies To:   EDUIN DEL CASTILLO            Allergies      Coded Allergies:             SULFA (SULFONAMIDE ANTIBIOTICS) (Verified  Allergy, Unknown, RASH, 19)            Medications      Last Reconciled on 21 10:04 pm by JASPREET BATEMAN      Simvastatin (Simvastatin*) 20 Mg Tablet      20 MG PO HS, #30 TAB 0 Refills         Reported         21       Escitalopram Oxalate (Escitalopram Oxalate*) 10 Mg Tablet      10 MG PO HS, TAB         Reported         20       amLODIPine (amLODIPine) 5 Mg Tablet      5 MG PO QDAY, #30 TAB         Reported         20       Hctz (hydroCHLOROthiazide) 25 Mg Tablet      25 MG PO QDAY, #30 TAB 0 Refills         Reported         19       Multivitamin (Multivitamins) 1 Each Capsule      1 EACH PO QDAY, CAP         Reported         18       Insulin Human Aspart (novoLOG VIAL) 100 Unit/1 Ml Vial      UNITS SUBQ QDAY, #1 VIAL 0 Refills         Reported         18       Pantoprazole (Protonix) 20 Mg Tablet.dr      40 MG PO QDAY PRN for acid reflux, TAB         Reported         18       Artificial Tears (Systane 0.3-0.4% Ophth) 1 Drop Drops      1 DROPS EYE EACH BID, ML         Reported         14       Lisinopril* (Lisinopril*) 40 Mg Tablet      40 MG PO QDAY, #30 TAB 0 Refills         Reported         14       Levothyroxine Sodium (Levoxyl*) 0.075 Mg Tablet      1 TAB PO QAM         Reported         11            Vital Signs      Height 5 ft 2 in / 157.48 cm       Weight 138 lbs 1 oz / 62.744608 kg      BSA 1.63 m2      BMI 25.3 kg/m2      Temperature 97.1 F / 36.17 C - Temporal      Pulse 57      Respirations 16      Blood Pressure 106/56 Sitting, Right Arm      Pulse Oximetry 97%, room air      Blood Glucose Value:  100 (Dexcom)            Eye Exam      When was your last eye exam?:        2021      Where was it done?:        [facility]            Pain Score      Experiencing any pain?:  No            Preventative      Hx Influenza Vaccination:  Yes (2018)      Date Influenza Vaccine Given:  Nov 1, 2020      Influenza Vaccine Declined:  No      Have You Had 2 or More Falls i:  No      Have You Had a Fall with an In:  No      Hx Pneumococcal Vaccination:  Yes      Encouraged to follow-up with:  PCP regarding preventative exams.      Chart initiated by:      Vianey Aparicio MA            HPI - Diabetes      This patient is seen in the office today for follow-up evaluation for insulin     pump management.  She is a 70-year-old female patient with a history of type 1     diabetes complicated by diabetic retinopathy.  She is currently managed using a     tandem insulin pump and a Dexcom continuous glucose sensor.  Her devices were     uploaded and reviewed with the patient.            The insulin pump report indicates an average sensor glucose of 152 mg/dL with a     high of 320 and a low of 40.  68% of glucose levels are within target range     between 70 and 180 128% are above target and 4% below target.  She is using     control IQ technology 95% the time.  She is using approximate 30 units of     insulin each day.            Her Dexcom continuous glucose sensor is an average glucose of 149 mg/dL with 63%    of glucose levels falling in target range while 32% are above target and 5% are     below target.  She is having episodes of hypoglycemia after meals.  She has a     postprandial peak and then will subsequently drop low approximately 3 hours     after the meal and  all 3 meals a day.  She indicates this may be in part to her     overestimating carbohydrates or thinking she will eat a certain amount of food     and not actually been able to eat it.            She states recently she has been having a lot of GI issues with reflux,     bloating, feelings of fullness and nausea and lots of gas.  She denies any co    nstipation or diarrhea.  And these may be symptoms of gastroparesis.  She is     being evaluated by gastroenterology services.  It was suggested to the patient     that she ask if this could be gastroparesis versus celiac's given her type 1     diabetes diagnosis.            Her GI distress may also be the cause of her episodes of fluctuating glucose     levels after meals.            Most Recent Lab Findings      Most Recent HGA1C      Her most recent A1c was collected on 5/26/2021 and was 7.2% indicating     uncontrolled type 2 diabetes.  This is down from her prior result of 7.4%     collected in February of this year.            Item Value  Date Time             Hemoglobin A1c 7.2 % H 5/26/21 1615            Diabetes Type:  Type 1      Diabetes Complications:  Retinopathy      Number of Years?:  41      Hospitalizations 2nd to DM?:  No      ER/911 Secondary to DM:  No      Dietary Habits:  3 Meals daily, Carb Count, Diet Drinks      Exercise:  Regularly      BG Monitoring:  CGM (Dexcom)      Frequency:  Times per day (Continuous)      Blood Glucose Range:        Sensor glucose averages 149 mg/dL            PAST,FAMILY,   Past Medical History      Past Medical History:  Anxiety, Cancer, GERD, Hyperlipidemia, Hypertension,     Retinopathy, Thyroid Disease            Past Surgical History      Past Surgical History:  Carpal Tunnel Release, Cholecystectomy, Hysterectomy,     Thyroid Surgery            Past Family History      TYPE 2 DM            Social History      Lives independently:  No      Occupation:  Retired            Tobacco Use      Smoking status:  Never  smoker            Alcohol Use      None            Substance Use      Substance Use:  Denies Use            Review of Sytems      General:  No Appetite Changes, No Fatigue, No Weight Loss, No Weight Gain, No     Weakness      Eyes:  No Blurred Vision, No Corrective Lenses, No Vision Changes, No Vision     Loss      Cardiovascular:  No Chest Pain, No Palpitations; Peripheral edema      Gastrointestinal:  No Constipation, No Diarrhea; Nausea/Vomiting      Integumentary:  No Lesions, No Rash, No Wounds      Neurologic:  No Extremity Pain, No Numbness, No Tingling, No Headache, No     Dizziness      Psychiatric:  No Anxiety, No Depression, No Stress      Endocrine:  No Hypoglycemia, No Hypo Unawareness, No Nocturnal Hypo, No Poly    uria, No Polyphagia, No Polydipsia      ENT:  No Hearing loss; Sinusitis; No Difficulty swallowing      Respiratory:  No Shortness of breath, No Wheezing, No Cough      Genitourinary:  No UTI, No Vaginal yeast infections, No Difficulty urinating, No    Incontinence      Musculoskeletal:  No Joint pain, No Muscle pain, No Back pain            Exam      Constitutional:  Awake and Alert, Appropriately dressed/groomed; Not Acutely Dis    tressed      Eyes:  Scleral Icterus; No Intact EOM      Cardiovascular:  No Peripheral Edema; Normal Chest Appearance      Musculoskeletal:  Steady Gait, Normal Strength, Normal ROM,       Respiratory:  No Respiratory Distress, No Cyanosis, No Accessory Muscle use      Skin:  No Blisters, No Cuts\Scrapes, No Acanthosis Nigricans, No DM Dermopathy,     No Fungus, No NLD, No Rash, No Vitiligo      Psychiatric:  Appropriate Affect, Intact Judgement, Oriented x 3,       ENMT:  Nose/ears normal; No Normal hearing      Extremities:  No Cyanosis, No Edema; Normal temperature; No Deformity            Impression      Type 1 diabetes with diabetic retinopathy, anxiety, GERD, hyperlipidemia,     hypertension, hypothyroid, possible gastroparesis            Diagnosis       Type 1 diabetes mellitus with hyperglycemia - E10.65            Notes            Plan      Because of the episodes of hypoglycemia in the morning the 7 AM to 11 AM     carbohydrate ratio of 1:12 was changed to 1-15.  The patient was encouraged to     be very cautious with her carbohydrate counting and to use her Dexcom continuous    glucose sensor directional arrows to adjust her insulin at meals to prevent     potential hypoglycemia.  She will contact her office to advise of the findings     of her GI testing.  We may need to make adjustments to her insulin pump if it is    determined she has gastroparesis.  The patient will be scheduled for routine     follow-up appointment in 3 months.  If she continues to have problematic     hypoglycemia she is to contact her office so additional adjustments can be made     to her pump settings.  She will otherwise be scheduled for follow-up appointment    in 3 months.            Pain Plan      Pain Zero Today            Topics Patient Counseled on      Topics Patient Counseled on:  Monitoring, Diet, Hypo Prevention            Electronically signed by JASPREET BATEMAN  06/01/2021 22:05       Disclaimer: Converted document may not contain table formatting or lab diagrams. Please see [employer] for the authenticated document.

## 2021-06-30 ENCOUNTER — OFFICE VISIT (OUTPATIENT)
Dept: FAMILY MEDICINE CLINIC | Age: 71
End: 2021-06-30

## 2021-06-30 ENCOUNTER — OFFICE VISIT (OUTPATIENT)
Dept: GASTROENTEROLOGY | Facility: CLINIC | Age: 71
End: 2021-06-30

## 2021-06-30 VITALS
BODY MASS INDEX: 25.32 KG/M2 | HEART RATE: 50 BPM | DIASTOLIC BLOOD PRESSURE: 63 MMHG | HEIGHT: 62 IN | SYSTOLIC BLOOD PRESSURE: 128 MMHG | WEIGHT: 137.6 LBS

## 2021-06-30 VITALS
TEMPERATURE: 98.2 F | WEIGHT: 148 LBS | DIASTOLIC BLOOD PRESSURE: 60 MMHG | BODY MASS INDEX: 27.07 KG/M2 | SYSTOLIC BLOOD PRESSURE: 126 MMHG | HEART RATE: 50 BPM

## 2021-06-30 DIAGNOSIS — E10.69 TYPE 1 DIABETES MELLITUS WITH OTHER SPECIFIED COMPLICATION (HCC): ICD-10-CM

## 2021-06-30 DIAGNOSIS — J38.00 VOCAL CORD PARALYSIS: ICD-10-CM

## 2021-06-30 DIAGNOSIS — E78.2 MIXED HYPERLIPIDEMIA: ICD-10-CM

## 2021-06-30 DIAGNOSIS — K21.9 GASTROESOPHAGEAL REFLUX DISEASE, UNSPECIFIED WHETHER ESOPHAGITIS PRESENT: ICD-10-CM

## 2021-06-30 DIAGNOSIS — R10.13 EPIGASTRIC PAIN: Primary | ICD-10-CM

## 2021-06-30 DIAGNOSIS — F41.9 ANXIETY: ICD-10-CM

## 2021-06-30 DIAGNOSIS — I10 BENIGN ESSENTIAL HYPERTENSION: Primary | ICD-10-CM

## 2021-06-30 DIAGNOSIS — K21.9 GASTROESOPHAGEAL REFLUX DISEASE WITHOUT ESOPHAGITIS: ICD-10-CM

## 2021-06-30 PROBLEM — J30.2 SEASONAL ALLERGIC RHINITIS: Status: ACTIVE | Noted: 2019-06-03

## 2021-06-30 PROBLEM — E10.9 TYPE 1 DIABETES MELLITUS: Status: ACTIVE | Noted: 2019-06-03

## 2021-06-30 PROBLEM — E78.5 HYPERLIPIDEMIA: Status: ACTIVE | Noted: 2021-06-30

## 2021-06-30 PROBLEM — R01.1 HEART MURMUR: Status: ACTIVE | Noted: 2021-06-30

## 2021-06-30 PROCEDURE — 99214 OFFICE O/P EST MOD 30 MIN: CPT | Performed by: NURSE PRACTITIONER

## 2021-06-30 RX ORDER — SIMVASTATIN 20 MG
TABLET ORAL
COMMUNITY
End: 2021-06-30 | Stop reason: SDUPTHER

## 2021-06-30 RX ORDER — LISINOPRIL 40 MG/1
TABLET ORAL
COMMUNITY
Start: 2021-06-25 | End: 2021-11-05

## 2021-06-30 RX ORDER — HYDROCHLOROTHIAZIDE 25 MG/1
TABLET ORAL
COMMUNITY
Start: 2021-01-26 | End: 2021-12-10

## 2021-06-30 RX ORDER — LEVOTHYROXINE SODIUM 0.07 MG/1
TABLET ORAL
COMMUNITY
Start: 2021-04-30 | End: 2021-08-04

## 2021-06-30 RX ORDER — ASPIRIN 81 MG/1
TABLET ORAL
COMMUNITY

## 2021-06-30 RX ORDER — PANTOPRAZOLE SODIUM 20 MG/1
20 TABLET, DELAYED RELEASE ORAL DAILY
Qty: 90 TABLET | Refills: 1 | Status: SHIPPED | OUTPATIENT
Start: 2021-06-30 | End: 2021-09-28

## 2021-06-30 RX ORDER — SIMVASTATIN 20 MG
20 TABLET ORAL NIGHTLY
Qty: 90 TABLET | Refills: 1 | Status: SHIPPED | OUTPATIENT
Start: 2021-06-30 | End: 2021-11-19

## 2021-06-30 RX ORDER — ESCITALOPRAM OXALATE 10 MG/1
10 TABLET ORAL DAILY
Qty: 90 TABLET | Refills: 1 | Status: SHIPPED | OUTPATIENT
Start: 2021-06-30 | End: 2021-11-19

## 2021-06-30 RX ORDER — ESCITALOPRAM OXALATE 10 MG/1
TABLET ORAL
COMMUNITY
Start: 2021-04-03 | End: 2021-06-30 | Stop reason: SDUPTHER

## 2021-06-30 RX ORDER — AMLODIPINE BESYLATE 5 MG/1
TABLET ORAL
COMMUNITY
Start: 2021-06-04 | End: 2021-10-28

## 2021-06-30 NOTE — PROGRESS NOTES
Chief Complaint  Abdominal Pain    Yohana Casillas is a 70 y.o. female who presents to [facility] on referral from No ref. provider found for a gastroenterology evaluation of epigastric pain.  History of Present Illness  She presents for evaluation of epigastric pain.  Reports experiencing severe epigastric pain a few weeks ago that lasted for approximately 4 days.  Admits associated nausea.  Denies vomiting.  Pain is now improved.  However, she continues to experience heartburn a few days per week.  This is improved some with Protonix 20 mg as needed.  She does not take this daily.  Denies change in bowel pattern, hematochezia and melena.  Admits previous EGD/colonoscopy (2014) per Dr. Burger.  EGD consistent with gastritis.  Normal colonoscopy.          Past Medical History:   Diagnosis Date   • Anxiety    • Cancer (CMS/HCC)    • GERD (gastroesophageal reflux disease)    • Hyperlipidemia    • Hypertension    • Retinopathy    • Thyroid disease        Past Surgical History:   Procedure Laterality Date   • CARPAL TUNNEL RELEASE     • CHOLECYSTECTOMY     • COLONOSCOPY     • HYSTERECTOMY     • THYROID SURGERY     • UPPER GASTROINTESTINAL ENDOSCOPY           Current Outpatient Medications:   •  hydroCHLOROthiazide (HYDRODIURIL) 25 MG tablet, hydrochlorothiazide 25 mg oral tablet TAKE 1 TABLET EVERY DAY 1/26/2021  Active, Disp: , Rfl:   •  amLODIPine (NORVASC) 5 MG tablet, , Disp: , Rfl:   •  aspirin (aspirin) 81 MG EC tablet, Aspirin Low Dose 81 mg oral tablet,delayed release (DR/EC) take 1 tablet (81 mg) by oral route once daily   Active, Disp: , Rfl:   •  escitalopram (LEXAPRO) 10 MG tablet, , Disp: , Rfl:   •  insulin aspart (NovoLOG) 100 UNIT/ML injection, Novolog U-100 Insulin aspart 100 unit/mL subcutaneous solution inject by subcutaneous route per prescriber's instructions. Insulin dosing requires individualization.   Active, Disp: , Rfl:   •  levothyroxine (SYNTHROID,  "LEVOTHROID) 75 MCG tablet, , Disp: , Rfl:   •  lisinopril (PRINIVIL,ZESTRIL) 40 MG tablet, , Disp: , Rfl:   •  pantoprazole (Protonix) 20 MG EC tablet, Take 1 tablet by mouth Daily for 90 days., Disp: 90 tablet, Rfl: 1  •  simvastatin (ZOCOR) 20 MG tablet, simvastatin 20 mg oral tablet take 1 tablet (20 mg) by oral route once daily   Active, Disp: , Rfl:      Allergies   Allergen Reactions   • Levofloxacin Unknown - High Severity   • Sulfa Antibiotics Unknown - High Severity       Family History   Problem Relation Age of Onset   • Diabetes type II Other         Social History     Social History Narrative   • Not on file       Immunization:  Immunization History   Administered Date(s) Administered   • Influenza, Unspecified 11/01/2020        Objective     Review of Systems   Constitutional: Negative for fever and unexpected weight loss.   Eyes: Negative for blurred vision and double vision.   Respiratory: Negative for cough and shortness of breath.    Cardiovascular: Negative for chest pain and palpitations.   Gastrointestinal:        See HPI   Genitourinary: Negative for dysuria and hematuria.   Musculoskeletal: Negative for gait problem and joint swelling.   Skin: Negative for rash and skin lesions.   Neurological: Negative for seizures, weakness, numbness and confusion.   Psychiatric/Behavioral: Negative for sleep disturbance and depressed mood.        Vital Signs:   /63 (BP Location: Left arm, Patient Position: Sitting, Cuff Size: Large Adult)   Pulse 50   Ht 157.5 cm (62\")   Wt 62.4 kg (137 lb 9.6 oz)   BMI 25.17 kg/m²       Physical Exam  Constitutional:       General: She is not in acute distress.     Appearance: Normal appearance. She is well-developed and normal weight.   HENT:      Head: Normocephalic and atraumatic.   Eyes:      Conjunctiva/sclera: Conjunctivae normal.      Pupils: Pupils are equal, round, and reactive to light.      Visual Fields: Right eye visual fields normal and left eye " visual fields normal.   Cardiovascular:      Rate and Rhythm: Normal rate and regular rhythm.      Heart sounds: Normal heart sounds.   Pulmonary:      Effort: Pulmonary effort is normal. No retractions.      Breath sounds: Normal breath sounds and air entry.   Abdominal:      General: Bowel sounds are normal.      Palpations: Abdomen is soft.      Tenderness: There is no abdominal tenderness.      Comments: No appreciable hepatosplenomegaly or ascites   Musculoskeletal:         General: Normal range of motion.      Cervical back: Neck supple.      Right lower leg: No edema.      Left lower leg: No edema.   Lymphadenopathy:      Cervical: No cervical adenopathy.   Skin:     General: Skin is warm and dry.      Findings: No lesion.   Neurological:      General: No focal deficit present.      Mental Status: She is alert and oriented to person, place, and time.   Psychiatric:         Mood and Affect: Mood and affect normal.         Behavior: Behavior normal.         Result Review :       Radiology: none reported  Data reviewed: GI studies EGD/colonoscopy      ENDOSCOPY, INT (12/18/2014)       Assessment and Plan    Diagnoses and all orders for this visit:    1. Epigastric pain (Primary)  -     pantoprazole (Protonix) 20 MG EC tablet; Take 1 tablet by mouth Daily for 90 days.  Dispense: 90 tablet; Refill: 1  -     Case Request; Standing  -     Case Request    2. Gastroesophageal reflux disease, unspecified whether esophagitis present  -     pantoprazole (Protonix) 20 MG EC tablet; Take 1 tablet by mouth Daily for 90 days.  Dispense: 90 tablet; Refill: 1  -     Case Request; Standing  -     Case Request    Other orders  -     Follow Anesthesia Guidelines / Protocol; Future  -     Obtain Informed Consent; Future  -     Obtain Informed Consent; Standing  -     Verify NPO; Standing    Recommend patient begin Protonix 20 mg daily.  Schedule for EGD to further work-up symptoms.    ESOPHAGOGASTRODUODENOSCOPY (N/A)        Follow  Up   Return for F/U after procedure.  Patient was given instructions and counseling regarding her condition or for health maintenance advice. Please see specific information pulled into the AVS if appropriate.

## 2021-06-30 NOTE — PROGRESS NOTES
Yohana Casillas presents to [facility].    Chief Complaint:  Hypertension (check up ) and Hyperlipidemia         History of Present Illness:  Follow up BP and cholesterol takes norvasc, HCTZ, ACE and zocor  (she had some labs in May 2021 A1C 7.2 and last lipid ) She needs her zocor and lexapro she takes for anxiety sent to her mail order pharmacy.  She had a visit re her vocal cords earlier this month and has a procedure scheduled next month. She sees MARLON Key for her diabetes       Review of Systems:  Review of Systems   Constitutional: Negative for fatigue and fever.   HENT:        Having another thyroplasty 7-22-21, Dr Aguilar at [facility]  Last visit 6-2-21    Respiratory: Negative for cough and shortness of breath.    Cardiovascular: Negative for chest pain, palpitations and leg swelling.   Gastrointestinal: Positive for GERD (saw EPHRAIM NP, will have an EGD 9-2021).   Neurological: Negative for numbness.          Vital Signs:   /60 (BP Location: Right arm, Patient Position: Sitting, Cuff Size: Adult)   Pulse 50   Temp 98.2 °F (36.8 °C) (Oral)   Wt 67.1 kg (148 lb)   BMI 27.07 kg/m²       Physical Exam:  Physical Exam  Vitals reviewed.   Constitutional:       General: She is not in acute distress.     Appearance: Normal appearance.   Neck:      Vascular: No carotid bruit.   Cardiovascular:      Rate and Rhythm: Normal rate and regular rhythm.      Heart sounds: Normal heart sounds. No murmur heard.     Pulmonary:      Effort: Pulmonary effort is normal. No respiratory distress.      Breath sounds: Normal breath sounds.   Musculoskeletal:      Right lower leg: No edema.      Left lower leg: No edema.   Neurological:      Mental Status: She is alert.   Psychiatric:         Mood and Affect: Mood normal.         Behavior: Behavior normal.         Result Review      The following data was reviewed by: JACKIE Rosen on 06/30/2021:  CMP    CMP 7/6/20 12/22/20 5/26/21    Glucose 144 (A) 168 (A) 73   BUN 18 14 15   Creatinine 0.89 0.77 0.67   Sodium 139 139 140   Potassium 4.2 4.2 4.2   Chloride 101 102 104   Calcium 9.7 9.1 9.1   Albumin 4.3 4.0 4.2   Total Bilirubin 0.44 0.51 0.24   Alkaline Phosphatase 69 74 76   AST (SGOT) 19 21 21   ALT (SGPT) 14 16 15   (A) Abnormal value            Lipid Panel    Lipid Panel 7/6/20 12/22/20   Total Cholesterol 179 180   Triglycerides 60 49   HDL Cholesterol 82 (A) 90 (A)   VLDL Cholesterol 12 10   LDL Cholesterol  85 80   (A) Abnormal value       Comments are available for some flowsheets but are not being displayed.           Most Recent A1C    HGBA1C Most Recent 5/26/21   Hemoglobin A1C 7.2 (A)   (A) Abnormal value       Comments are available for some flowsheets but are not being displayed.           No results found for this or any previous visit.            Assessment and Plan:          Diagnoses and all orders for this visit:    1. Benign essential hypertension (Primary)  Assessment & Plan:  Continue current rx's for her Hypertension       2. Mixed hyperlipidemia  Assessment & Plan:  Continue zocor, due a lipid, order placed to get with next labs     Orders:  -     simvastatin (ZOCOR) 20 MG tablet; Take 1 tablet by mouth Every Night for 90 days.  Dispense: 90 tablet; Refill: 1  -     Lipid Panel; Future    3. Type 1 diabetes mellitus with other specified complication (CMS/[facility])  Assessment & Plan:  Reviewed diabetes labs with patient       4. Anxiety  Assessment & Plan:  Continue lexapro    Orders:  -     escitalopram (LEXAPRO) 10 MG tablet; Take 1 tablet by mouth Daily for 90 days.  Dispense: 90 tablet; Refill: 1    5. Gastroesophageal reflux disease without esophagitis  Assessment & Plan:  Keep her follow up for EGD          Follow Up   Return for fasting for labs in approx August 2021.  Patient was given instructions and counseling regarding her condition or for health maintenance advice. Please see specific information pulled into  the AVS if appropriate.

## 2021-07-01 VITALS
WEIGHT: 130.5 LBS | HEART RATE: 59 BPM | SYSTOLIC BLOOD PRESSURE: 130 MMHG | HEIGHT: 63 IN | BODY MASS INDEX: 23.12 KG/M2 | TEMPERATURE: 97.6 F | DIASTOLIC BLOOD PRESSURE: 55 MMHG

## 2021-07-01 VITALS
HEIGHT: 63 IN | TEMPERATURE: 97.4 F | SYSTOLIC BLOOD PRESSURE: 129 MMHG | HEART RATE: 60 BPM | WEIGHT: 132.6 LBS | BODY MASS INDEX: 23.5 KG/M2 | DIASTOLIC BLOOD PRESSURE: 54 MMHG

## 2021-07-01 VITALS
DIASTOLIC BLOOD PRESSURE: 54 MMHG | SYSTOLIC BLOOD PRESSURE: 124 MMHG | HEART RATE: 65 BPM | WEIGHT: 135.8 LBS | HEIGHT: 63 IN | TEMPERATURE: 98 F | BODY MASS INDEX: 24.06 KG/M2

## 2021-07-01 VITALS
TEMPERATURE: 97.9 F | DIASTOLIC BLOOD PRESSURE: 73 MMHG | BODY MASS INDEX: 23.53 KG/M2 | SYSTOLIC BLOOD PRESSURE: 131 MMHG | HEART RATE: 63 BPM | WEIGHT: 132.8 LBS | HEIGHT: 63 IN

## 2021-07-01 VITALS
BODY MASS INDEX: 23.53 KG/M2 | HEIGHT: 63 IN | TEMPERATURE: 98.2 F | DIASTOLIC BLOOD PRESSURE: 59 MMHG | HEART RATE: 70 BPM | SYSTOLIC BLOOD PRESSURE: 122 MMHG | WEIGHT: 132.8 LBS

## 2021-07-01 VITALS
BODY MASS INDEX: 23.53 KG/M2 | TEMPERATURE: 97.9 F | SYSTOLIC BLOOD PRESSURE: 139 MMHG | HEIGHT: 63 IN | HEART RATE: 47 BPM | DIASTOLIC BLOOD PRESSURE: 61 MMHG | WEIGHT: 132.8 LBS

## 2021-07-01 VITALS
DIASTOLIC BLOOD PRESSURE: 65 MMHG | BODY MASS INDEX: 23.07 KG/M2 | WEIGHT: 130.2 LBS | HEART RATE: 61 BPM | HEIGHT: 63 IN | SYSTOLIC BLOOD PRESSURE: 133 MMHG | TEMPERATURE: 97.7 F

## 2021-07-01 VITALS
DIASTOLIC BLOOD PRESSURE: 62 MMHG | WEIGHT: 131.6 LBS | SYSTOLIC BLOOD PRESSURE: 132 MMHG | TEMPERATURE: 97 F | BODY MASS INDEX: 23.32 KG/M2 | HEART RATE: 61 BPM | HEIGHT: 63 IN

## 2021-07-01 VITALS
TEMPERATURE: 97.6 F | HEART RATE: 57 BPM | WEIGHT: 131.2 LBS | BODY MASS INDEX: 23.25 KG/M2 | HEIGHT: 63 IN | SYSTOLIC BLOOD PRESSURE: 118 MMHG | DIASTOLIC BLOOD PRESSURE: 72 MMHG

## 2021-07-01 VITALS
DIASTOLIC BLOOD PRESSURE: 69 MMHG | WEIGHT: 132 LBS | HEART RATE: 59 BPM | BODY MASS INDEX: 23.39 KG/M2 | SYSTOLIC BLOOD PRESSURE: 131 MMHG | TEMPERATURE: 96.5 F | HEIGHT: 63 IN

## 2021-07-01 RX ORDER — ROSUVASTATIN CALCIUM 5 MG/1
TABLET, COATED ORAL
Qty: 90 TABLET | Refills: 0 | OUTPATIENT
Start: 2021-07-01

## 2021-07-01 RX ORDER — ESCITALOPRAM OXALATE 10 MG/1
TABLET ORAL
Qty: 90 TABLET | Refills: 0 | OUTPATIENT
Start: 2021-07-01

## 2021-07-02 VITALS
SYSTOLIC BLOOD PRESSURE: 144 MMHG | HEART RATE: 45 BPM | DIASTOLIC BLOOD PRESSURE: 54 MMHG | BODY MASS INDEX: 23.96 KG/M2 | TEMPERATURE: 96.4 F | HEIGHT: 63 IN | WEIGHT: 135.2 LBS

## 2021-07-02 VITALS
BODY MASS INDEX: 24.64 KG/M2 | HEIGHT: 63 IN | DIASTOLIC BLOOD PRESSURE: 60 MMHG | SYSTOLIC BLOOD PRESSURE: 120 MMHG | TEMPERATURE: 96.6 F

## 2021-07-02 VITALS
SYSTOLIC BLOOD PRESSURE: 135 MMHG | BODY MASS INDEX: 23.69 KG/M2 | HEART RATE: 53 BPM | WEIGHT: 133.7 LBS | TEMPERATURE: 97.2 F | HEIGHT: 63 IN | DIASTOLIC BLOOD PRESSURE: 57 MMHG

## 2021-07-02 VITALS
SYSTOLIC BLOOD PRESSURE: 133 MMHG | DIASTOLIC BLOOD PRESSURE: 70 MMHG | WEIGHT: 137.4 LBS | BODY MASS INDEX: 24.34 KG/M2 | TEMPERATURE: 96.9 F | HEART RATE: 56 BPM | HEIGHT: 63 IN

## 2021-07-19 PROBLEM — R00.2 PALPITATIONS: Status: ACTIVE | Noted: 2021-07-19

## 2021-08-04 RX ORDER — LEVOTHYROXINE SODIUM 0.07 MG/1
TABLET ORAL
Qty: 90 TABLET | Refills: 1 | Status: SHIPPED | OUTPATIENT
Start: 2021-08-04 | End: 2022-01-17

## 2021-08-20 ENCOUNTER — DOCUMENTATION (OUTPATIENT)
Dept: DIABETES SERVICES | Facility: HOSPITAL | Age: 71
End: 2021-08-20

## 2021-09-10 ENCOUNTER — OFFICE VISIT (OUTPATIENT)
Dept: DIABETES SERVICES | Facility: CLINIC | Age: 71
End: 2021-09-10

## 2021-09-10 VITALS
WEIGHT: 138.3 LBS | DIASTOLIC BLOOD PRESSURE: 78 MMHG | SYSTOLIC BLOOD PRESSURE: 116 MMHG | BODY MASS INDEX: 25.45 KG/M2 | OXYGEN SATURATION: 99 % | HEIGHT: 62 IN | HEART RATE: 51 BPM

## 2021-09-10 DIAGNOSIS — E10.65 UNCONTROLLED TYPE 1 DIABETES MELLITUS WITH HYPERGLYCEMIA (HCC): Primary | ICD-10-CM

## 2021-09-10 PROCEDURE — 99214 OFFICE O/P EST MOD 30 MIN: CPT | Performed by: NURSE PRACTITIONER

## 2021-09-10 PROCEDURE — 95251 CONT GLUC MNTR ANALYSIS I&R: CPT | Performed by: NURSE PRACTITIONER

## 2021-09-10 NOTE — PROGRESS NOTES
Chief Complaint  Diabetes (MED REILLS, NO OTHER CONCERNS AT THIS TIME.)    Referred By: No ref. provider found    Subjective          Yohana Casillas presents to [facility] for insulin pump management    History of Present Illness    Visit type:  follow-up  Diabetes type:  Type 1  Current diabetes status/concerns/issues: She states that she was having some high glucose levels after her thyroplasty but those seem to have improved now.  Other health concerns: She recently underwent thyroplasty due to problematic choking and coughing   Diabetes symptoms:  denies polydipsia, polyuria, polyphagia, blurred vision, or excessive fatigue  Diabetes complications:  Retinopathy (Per patient questionnaire)  Hypoglycemia:  Level 1 hypoglycemia (54 mg/dL - 70 mg/dL); Frequency - 5% of glucose levels are hypoglycemic according to the continuous glucose sensor report and Level 2 (less than 54 mg/dL); Frequency - The sensor report indicates occasional episodes of level 2 hypoglycemia  Hypoglycemia Symptoms:  shaking/tremors  Current diabetes treatment: She is using a tandem insulin pump with NovoLog insulin.  She uses approximately 30 units of insulin each day  Blood glucose device:  Dexcom CGM  Blood glucose monitoring frequency:  Continuous per CGM  Blood glucose range/average: The tandem insulin pump indicates an average glucose of 150 mg/dL with a high of 351 and low of 40 mg/dL.  66% of glucose levels are in target range between 70 and 180 while 29% are above target and 5% are below target.  She is having postprandial hyperglycemia in the morning causing the control IQ feature of the pump to give additional insulin.  This may be leading to subsequent episodes of hypoglycemia.  Diet:  Carbohydrate Counting, Avoids high carb/sweet foods, Diet drinks only  Activity:  She does try to go to the gym at times doing a video walking workout    Past Medical History:   Diagnosis Date   • Anxiety    • Cancer  (CMS/[facility])    • Diabetes mellitus type I (CMS/[facility])    • GERD (gastroesophageal reflux disease)    • History of thyroplasty 07/2021   • Hyperlipidemia    • Hypertension    • Retinopathy    • Thyroid disease      Past Surgical History:   Procedure Laterality Date   • CARPAL TUNNEL RELEASE     • CHOLECYSTECTOMY     • COLONOSCOPY     • HYSTERECTOMY     • THYROID SURGERY     • UPPER GASTROINTESTINAL ENDOSCOPY       Family History   Problem Relation Age of Onset   • Diabetes type II Other      Social History     Socioeconomic History   • Marital status:      Spouse name: Not on file   • Number of children: Not on file   • Years of education: Not on file   • Highest education level: Not on file   Tobacco Use   • Smoking status: Never Smoker   • Smokeless tobacco: Never Used   Vaping Use   • Vaping Use: Never used   Substance and Sexual Activity   • Alcohol use: Not Currently   • Drug use: Never   • Sexual activity: Defer     Allergies   Allergen Reactions   • Sulfa Antibiotics Hives   • Levofloxacin Rash       Current Outpatient Medications:   •  amLODIPine (NORVASC) 5 MG tablet, , Disp: , Rfl:   •  aspirin (aspirin) 81 MG EC tablet, Aspirin Low Dose 81 mg oral tablet,delayed release (DR/EC) take 1 tablet (81 mg) by oral route once daily   Active, Disp: , Rfl:   •  escitalopram (LEXAPRO) 10 MG tablet, Take 1 tablet by mouth Daily for 90 days., Disp: 90 tablet, Rfl: 1  •  hydroCHLOROthiazide (HYDRODIURIL) 25 MG tablet, hydrochlorothiazide 25 mg oral tablet TAKE 1 TABLET EVERY DAY 1/26/2021  Active, Disp: , Rfl:   •  levothyroxine (SYNTHROID, LEVOTHROID) 75 MCG tablet, TAKE ONE TABLET BY MOUTH DAILY, Disp: 90 tablet, Rfl: 1  •  lisinopril (PRINIVIL,ZESTRIL) 40 MG tablet, , Disp: , Rfl:   •  NovoLOG 100 UNIT/ML injection, USE AS DIRECTED IN INSULIN PUMP MAX 65 UNITS PER DAY, Disp: 20 each, Rfl: 5  •  pantoprazole (Protonix) 20 MG EC tablet, Take 1 tablet by mouth Daily for 90 days., Disp: 90 tablet, Rfl: 1  •   "simvastatin (ZOCOR) 20 MG tablet, Take 1 tablet by mouth Every Night for 90 days., Disp: 90 tablet, Rfl: 1    Review of Systems   Constitutional: Negative for activity change, appetite change, fatigue, fever, unexpected weight gain and unexpected weight loss.   HENT: Positive for tinnitus. Negative for congestion, ear pain, facial swelling, hearing loss and sore throat.    Eyes: Negative for blurred vision, double vision, redness and visual disturbance.   Respiratory: Positive for shortness of breath. Negative for cough and wheezing.    Cardiovascular: Negative for chest pain, palpitations and leg swelling.   Gastrointestinal: Negative for abdominal distention, constipation, diarrhea, nausea, vomiting, GERD and indigestion.   Endocrine: Negative for polydipsia, polyphagia and polyuria.   Genitourinary: Negative for difficulty urinating, frequency and urgency.   Musculoskeletal: Negative for back pain, gait problem and myalgias.   Skin: Negative for rash, skin lesions and bruise.   Neurological: Negative for seizures, speech difficulty, weakness, headache and confusion.   Psychiatric/Behavioral: Negative for sleep disturbance, depressed mood and stress. The patient is not nervous/anxious.         Objective     Vitals:    09/10/21 1423   BP: 116/78   BP Location: Right arm   Patient Position: Sitting   Pulse: 51   SpO2: 99%   Weight: 62.7 kg (138 lb 4.8 oz)   Height: 157.5 cm (62\")   PainSc: 0-No pain     Body mass index is 25.3 kg/m².      Physical Exam  Constitutional:       Appearance: Normal appearance.      Comments: Overweight with BMI of 25.3   HENT:      Head: Normocephalic and atraumatic.      Right Ear: External ear normal.      Left Ear: External ear normal.      Nose: Nose normal.   Eyes:      Extraocular Movements: Extraocular movements intact.      Conjunctiva/sclera: Conjunctivae normal.   Pulmonary:      Effort: Pulmonary effort is normal.   Musculoskeletal:         General: Normal range of motion.     "  Cervical back: Normal range of motion.   Skin:     General: Skin is warm and dry.   Neurological:      General: No focal deficit present.      Mental Status: She is alert and oriented to person, place, and time. Mental status is at baseline.   Psychiatric:         Mood and Affect: Mood normal.         Behavior: Behavior normal.         Thought Content: Thought content normal.         Judgment: Judgment normal.         Result Review :   The following data was reviewed by: JACKIE Qureshi on 09/10/2021:        Hemoglobin A1c was collected following her appointment in the office today and was 6.85% indicating controlled type 1 diabetes.  This is down from the prior result of 7.2% collected in May of this year.    Most Recent A1C    HGBA1C Most Recent 9/13/21   Hemoglobin A1C 6.85 (A)   (A) Abnormal value              A1C Last 3 Results    HGBA1C Last 3 Results 2/26/21 5/26/21 9/13/21   Hemoglobin A1C 7.4 (A) 7.2 (A) 6.85 (A)   (A) Abnormal value       Comments are available for some flowsheets but are not being displayed.                 Assessment:  Diagnoses and all orders for this visit:    1. Uncontrolled type 1 diabetes mellitus with hyperglycemia (CMS/[facility]) (Primary)  -     Hemoglobin A1c; Future        Plan: We will try lowering the patient's carbohydrate ratio to see if this will minimize the control IQ giving additional insulin to prevent postprandial hyperglycemia.  The 2 AM to 7 AM carb ratio of 1:15 will be decreased to 1-12.  The patient will contact the office if this worsens the hypoglycemia in any way.  Otherwise, she will be seen for routine follow-up appointment in 3 months.    The patient will monitor her blood glucose levels using her continuous glucose sensor.  If she develops hypoglycemia or experiences any increased frequency or severity of hypoglycemia, she will contact the office for further instructions.          Follow Up     Return in about 3 months (around 12/10/2021) for  Medication Management, CGM Follow-up.    Patient was given instructions and counseling regarding her condition or for health maintenance advice. Please see specific information pulled into the AVS if appropriate.     Ame Key, APRN  09/10/2021

## 2021-09-13 ENCOUNTER — LAB (OUTPATIENT)
Dept: LAB | Facility: HOSPITAL | Age: 71
End: 2021-09-13

## 2021-09-13 DIAGNOSIS — E78.2 MIXED HYPERLIPIDEMIA: ICD-10-CM

## 2021-09-13 DIAGNOSIS — E10.65 UNCONTROLLED TYPE 1 DIABETES MELLITUS WITH HYPERGLYCEMIA (HCC): ICD-10-CM

## 2021-09-13 PROCEDURE — 36415 COLL VENOUS BLD VENIPUNCTURE: CPT

## 2021-09-13 PROCEDURE — 83036 HEMOGLOBIN GLYCOSYLATED A1C: CPT

## 2021-09-13 PROCEDURE — 80061 LIPID PANEL: CPT

## 2021-09-14 ENCOUNTER — TELEPHONE (OUTPATIENT)
Dept: GASTROENTEROLOGY | Facility: CLINIC | Age: 71
End: 2021-09-14

## 2021-09-14 LAB
CHOLEST SERPL-MCNC: 165 MG/DL (ref 0–200)
HBA1C MFR BLD: 6.85 % (ref 4.8–5.6)
HDLC SERPL-MCNC: 71 MG/DL (ref 40–60)
LDLC SERPL CALC-MCNC: 81 MG/DL (ref 0–100)
LDLC/HDLC SERPL: 1.14 {RATIO}
TRIGL SERPL-MCNC: 64 MG/DL (ref 0–150)
VLDLC SERPL-MCNC: 13 MG/DL (ref 5–40)

## 2021-09-14 NOTE — TELEPHONE ENCOUNTER
Patient called from left voicemail. Patient was informed that due to the pandemic and other circumstances, her procedure was needing to be canceled and would be rescheduled as soon as we would. Patient was placed on log.

## 2021-09-27 ENCOUNTER — TELEPHONE (OUTPATIENT)
Dept: GASTROENTEROLOGY | Facility: CLINIC | Age: 71
End: 2021-09-27

## 2021-09-27 NOTE — PROGRESS NOTES
[facility]  Cardiology progress Note    Patient Name: Yohana Casillas  : 1950    CHIEF COMPLAINT  Hypertension.      Subjective   Subjective     HISTORY OF PRESENT ILLNESS    Yohana Casillas is a 70 y.o. female history of hypertension.  No chest pain.  Has some shortness of breath occasionally not exertional.    Review of Systems:   Constitutional no fever,  no weight loss   Skin no rash   Otolaryngeal no difficulty swallowing   Cardiovascular See HPI   Pulmonary no cough, no sputum production   Gastrointestinal no constipation, no diarrhea   Genitourinary no dysuria, no hematuria   Hematologic no easy bruisability, no abnormal bleeding   Musculoskeletal no muscle pain   Neurologic no dizziness, no falls         Personal History     Social History:  reports that she has never smoked. She has never used smokeless tobacco. She reports previous alcohol use. She reports that she does not use drugs.    Home Medications:  Current Outpatient Medications on File Prior to Visit   Medication Sig   • amLODIPine (NORVASC) 5 MG tablet    • aspirin (aspirin) 81 MG EC tablet Aspirin Low Dose 81 mg oral tablet,delayed release (DR/EC) take 1 tablet (81 mg) by oral route once daily   Active   • escitalopram (LEXAPRO) 10 MG tablet Take 1 tablet by mouth Daily for 90 days.   • hydroCHLOROthiazide (HYDRODIURIL) 25 MG tablet hydrochlorothiazide 25 mg oral tablet TAKE 1 TABLET EVERY DAY 2021  Active   • levothyroxine (SYNTHROID, LEVOTHROID) 75 MCG tablet TAKE ONE TABLET BY MOUTH DAILY   • lisinopril (PRINIVIL,ZESTRIL) 40 MG tablet    • NovoLOG 100 UNIT/ML injection USE AS DIRECTED IN INSULIN PUMP MAX 65 UNITS PER DAY   • pantoprazole (Protonix) 20 MG EC tablet Take 1 tablet by mouth Daily for 90 days.   • simvastatin (ZOCOR) 20 MG tablet Take 1 tablet by mouth Every Night for 90 days.     No current facility-administered medications on file prior to visit.     Allergies:  Allergies   Allergen Reactions   • Sulfa  Antibiotics Hives   • Levofloxacin Rash       Objective    Objective       Vitals:   Heart Rate:  [58] 58  BP: (120)/(58) 120/58  Body mass index is 24.87 kg/m².     Physical Exam:   Constitutional: Awake, alert, No acute distress    Eyes: PERRLA, sclerae anicteric, no conjunctival injection   HENT: NCAT, mucous membranes moist   Neck: Supple, no thyromegaly, no lymphadenopathy, trachea midline   Respiratory: Clear to auscultation bilaterally, nonlabored respirations    Cardiovascular: RRR, no murmurs or rubs. Palpable pedal pulses bilaterally   Musculoskeletal: No bilateral ankle edema, no cyanosis to extremities   Psychiatric: Appropriate affect, cooperative   Neurologic: Oriented x 3, strength symmetric in all extremities, Cranial Nerves grossly intact to confrontation, speech clear   Skin: No rashes.    Result Review    Result Review:  I have personally reviewed the available results from  [x]  Laboratory  [x]  EKG  [x]  Cardiology  [x]  Medications  [x]  Old records  []  Other:   Procedures  Lab Results   Component Value Date    CHOL 165 09/13/2021     Lab Results   Component Value Date    TRIG 64 09/13/2021    TRIG 49 12/22/2020    TRIG 60 07/06/2020     Lab Results   Component Value Date    HDL 71 (H) 09/13/2021    HDL 90 (H) 12/22/2020    HDL 82 (H) 07/06/2020     Lab Results   Component Value Date    LDL 81 09/13/2021    LDL 80 12/22/2020    LDL 85 07/06/2020     Lab Results   Component Value Date    VLDL 13 09/13/2021    VLDL 10 12/22/2020    VLDL 12 07/06/2020         Impression/Plan:  1. Essential hypertension controlled: Continue amlodipine and hydralazine.  2.  Hyperlipidemia: Continue simvastatin.  Lipid profile reviewed.  Shows an LDL of 81.  3.  Shortness of breath: Echocardiogram to evaluate left ventricular systolic function and any significant valvular abnormalities.  Continue hydrochlorothiazide.  Low-salt diet fluid restriction advised.        Dameon Leigh MD   09/28/21   14:14 EDT

## 2021-09-27 NOTE — TELEPHONE ENCOUNTER
Called patient to see if she would be able to do an EGD tomorrow with Dr. Sanz. There was no answer. Left message for a call back.

## 2021-09-28 ENCOUNTER — OFFICE VISIT (OUTPATIENT)
Dept: CARDIOLOGY | Facility: CLINIC | Age: 71
End: 2021-09-28

## 2021-09-28 VITALS
SYSTOLIC BLOOD PRESSURE: 120 MMHG | HEART RATE: 58 BPM | HEIGHT: 62 IN | BODY MASS INDEX: 25.03 KG/M2 | DIASTOLIC BLOOD PRESSURE: 58 MMHG | WEIGHT: 136 LBS

## 2021-09-28 DIAGNOSIS — E78.2 HYPERLIPEMIA, MIXED: ICD-10-CM

## 2021-09-28 DIAGNOSIS — R06.02 SHORTNESS OF BREATH: ICD-10-CM

## 2021-09-28 DIAGNOSIS — I10 HYPERTENSION, ESSENTIAL: Primary | ICD-10-CM

## 2021-09-28 PROCEDURE — 99214 OFFICE O/P EST MOD 30 MIN: CPT | Performed by: SPECIALIST

## 2021-10-13 ENCOUNTER — TELEPHONE (OUTPATIENT)
Dept: GASTROENTEROLOGY | Facility: CLINIC | Age: 71
End: 2021-10-13

## 2021-10-13 NOTE — TELEPHONE ENCOUNTER
Left msg for patient to call the office to schedule procedure that was cancelled previously due to covid.

## 2021-10-18 PROBLEM — M79.671 FOOT PAIN, RIGHT: Status: ACTIVE | Noted: 2020-01-21

## 2021-10-18 PROBLEM — N95.2 ATROPHIC VAGINITIS: Status: ACTIVE | Noted: 2021-10-18

## 2021-10-18 PROBLEM — T78.49XA OTHER ALLERGY, INITIAL ENCOUNTER: Status: ACTIVE | Noted: 2021-08-04

## 2021-10-18 PROBLEM — E78.00 HYPERCHOLESTEROLEMIA: Status: ACTIVE | Noted: 2019-06-03

## 2021-10-18 PROBLEM — R13.10 DYSPHAGIA: Status: ACTIVE | Noted: 2021-08-04

## 2021-10-21 ENCOUNTER — OFFICE VISIT (OUTPATIENT)
Dept: UROLOGY | Facility: CLINIC | Age: 71
End: 2021-10-21

## 2021-10-21 VITALS — HEIGHT: 62 IN | WEIGHT: 138.6 LBS | BODY MASS INDEX: 25.51 KG/M2

## 2021-10-21 DIAGNOSIS — N95.2 ATROPHIC VAGINITIS: Primary | ICD-10-CM

## 2021-10-21 DIAGNOSIS — N39.0 RECURRENT UTI: ICD-10-CM

## 2021-10-21 DIAGNOSIS — N30.20 CHRONIC CYSTITIS: ICD-10-CM

## 2021-10-21 LAB
BILIRUB BLD-MCNC: NEGATIVE MG/DL
CLARITY, POC: CLEAR
COLOR UR: YELLOW
EXPIRATION DATE: 1122
GLUCOSE UR STRIP-MCNC: NEGATIVE MG/DL
KETONES UR QL: NEGATIVE
LEUKOCYTE EST, POC: NEGATIVE
Lab: ABNORMAL
NITRITE UR-MCNC: NEGATIVE MG/ML
PH UR: 6 [PH] (ref 5–8)
PROT UR STRIP-MCNC: NEGATIVE MG/DL
RBC # UR STRIP: ABNORMAL /UL
SP GR UR: 1.01 (ref 1–1.03)
UROBILINOGEN UR QL: NORMAL

## 2021-10-21 PROCEDURE — 81003 URINALYSIS AUTO W/O SCOPE: CPT | Performed by: UROLOGY

## 2021-10-21 PROCEDURE — 99214 OFFICE O/P EST MOD 30 MIN: CPT | Performed by: UROLOGY

## 2021-10-21 RX ORDER — HYDROCHLOROTHIAZIDE 25 MG/1
TABLET ORAL EVERY 24 HOURS
COMMUNITY
End: 2021-11-03 | Stop reason: SDUPTHER

## 2021-10-21 RX ORDER — CONJUGATED ESTROGENS 0.62 MG/G
CREAM VAGINAL 2 TIMES WEEKLY
Qty: 30 G | Refills: 3 | Status: SHIPPED | OUTPATIENT
Start: 2021-10-21 | End: 2022-08-22

## 2021-10-21 RX ORDER — ESTRADIOL 10 UG/1
INSERT VAGINAL
COMMUNITY
End: 2021-11-03

## 2021-10-21 NOTE — PROGRESS NOTES
UROLOGY OFFICE FOLLOW-UP NOTE    Subjective   HPI  Yohana Casillas is a 70 y.o. female history of cystitis. She is also a diabetic and has been having complaints of frequency, urgency, and dysuria. She was having problems with chronic infections until she started on Premarin vaginal cream and now has been doing well. She did have an episode where she was burning and going to the bathroom frequently. She started on antibiotic therapy and then it stopped. She is not having any symptoms today.    Update 1/15/19: Patient presents for annual follow-up of urinary symptoms and atrophic vaginitis.  Patient states that she has been doing well for quite some time but is recently status post cholecystectomy.  She reports 2 UTIs prior to cholecystectomy and once since.  Treated with empiric antibiotics.  She is unsure if cultures were obtained; not available for review.  Patient in addition to antibiotics took pyridium with relief of UTI symptoms.  Patient was also out of Premarin cream prior to UTIs.  Otherwise patient denies new complaints or changes to history since last visit.    Update 7/30/2019: Presents for routine follow-up.  Patient notes only one UTI since last visit which was treated without issue via antibiotics.  Has improved urinary urgency.  Denies urinary complaints today.  Patient continues to apply vaginal estrogen cream twice weekly.  Requests refills.    Update 9/3/20:  Presents for annual follow up; 2 uti over the last year. Urinary urgency is currently tolerable. Takes AZO on occasion; bladder irritated with tomatoes.  Continues to use estrogen cream weekly.    Update 10/21/2021: Patient presents for routine annual visit. States she was on 2-week vacation; having some irritation with voiding, slight burning, and pelvic pressure. Unsure if she has UTI. Needs refill of vaginal estrogen cream.  Thinks she may have had 1 uti since last visit.  Overall doing well.        Results for orders placed or performed  in visit on 10/21/21   POC Urinalysis Dipstick, Automated    Specimen: Urine   Result Value Ref Range    Color Yellow Yellow, Straw, Dark Yellow, Vianey    Clarity, UA Clear Clear    Specific Gravity  1.015 1.005 - 1.030    pH, Urine 6.0 5.0 - 8.0    Leukocytes Negative Negative    Nitrite, UA Negative Negative    Protein, POC Negative Negative mg/dL    Glucose, UA Negative Negative, 1000 mg/dL (3+) mg/dL    Ketones, UA Negative Negative    Urobilinogen, UA Normal Normal    Bilirubin Negative Negative    Blood, UA Trace (A) Negative    Lot Number [id number]     Expiration Date 1,122          Medical History:  Past Medical History:   Diagnosis Date   • Anxiety    • Cancer (HCC)    • Diabetes mellitus type I (HCC)    • GERD (gastroesophageal reflux disease)    • History of thyroplasty 07/2021   • Hyperlipidemia    • Hypertension    • Retinopathy    • Thyroid disease         Social History:  Social History     Socioeconomic History   • Marital status:    Tobacco Use   • Smoking status: Never Smoker   • Smokeless tobacco: Never Used   Vaping Use   • Vaping Use: Never used   Substance and Sexual Activity   • Alcohol use: Not Currently   • Drug use: Never   • Sexual activity: Defer        Family History:  Family History   Problem Relation Age of Onset   • Diabetes type II Other         Surgical History:  Past Surgical History:   Procedure Laterality Date   • CARPAL TUNNEL RELEASE     • CHOLECYSTECTOMY     • COLONOSCOPY     • HYSTERECTOMY     • THYROID SURGERY     • UPPER GASTROINTESTINAL ENDOSCOPY          Allergies:  Allergies   Allergen Reactions   • Sulfa Antibiotics Hives   • Levofloxacin Rash        Current Medications:  Current Outpatient Medications   Medication Sig Dispense Refill   • amLODIPine (NORVASC) 5 MG tablet      • aspirin (aspirin) 81 MG EC tablet Aspirin Low Dose 81 mg oral tablet,delayed release (DR/EC) take 1 tablet (81 mg) by oral route once daily   Active     • estradiol (Vagifem) 10 MCG tablet  "vaginal tablet 1 ea     • hydroCHLOROthiazide (HYDRODIURIL) 25 MG tablet hydrochlorothiazide 25 mg oral tablet TAKE 1 TABLET EVERY DAY 1/26/2021  Active     • hydroCHLOROthiazide (HYDRODIURIL) 25 MG tablet Daily.     • levothyroxine (SYNTHROID, LEVOTHROID) 75 MCG tablet TAKE ONE TABLET BY MOUTH DAILY 90 tablet 1   • lisinopril (PRINIVIL,ZESTRIL) 40 MG tablet      • NovoLOG 100 UNIT/ML injection USE AS DIRECTED IN INSULIN PUMP MAX 65 UNITS PER DAY 20 each 5   • conjugated estrogens (Premarin) 0.625 MG/GM vaginal cream Insert  into the vagina 2 (Two) Times a Week. Pea sized amount 30 g 3   • escitalopram (LEXAPRO) 10 MG tablet Take 1 tablet by mouth Daily for 90 days. 90 tablet 1   • simvastatin (ZOCOR) 20 MG tablet Take 1 tablet by mouth Every Night for 90 days. 90 tablet 1     No current facility-administered medications for this visit.       Review of systems  Constitutional: Denies fever chills  GI: Denies nausea, vomiting    Objective     Vital Signs:   Ht 157.5 cm (62\")   Wt 62.9 kg (138 lb 9.6 oz)   BMI 25.35 kg/m²       Physical exam  No acute distress, well-nourished  Awake alert and oriented  Mood normal; affect normal    Problem List:  Patient Active Problem List   Diagnosis   • Anxiety   • Hypertension, essential   • Gastroesophageal reflux disease   • Heart murmur   • Hyperlipidemia   • Seasonal allergic rhinitis   • Type 1 diabetes mellitus (HCC)   • Vocal cord paralysis   • Epigastric pain   • Palpitations   • Shortness of breath   • Atrophic vaginitis   • Dysphagia   • Foot pain, right   • Other allergy, initial encounter   • Hypercholesterolemia   • Recurrent UTI   • Chronic cystitis       Assessment/Plan   Diagnoses and all orders for this visit:    1. Atrophic vaginitis (Primary)  -     conjugated estrogens (Premarin) 0.625 MG/GM vaginal cream; Insert  into the vagina 2 (Two) Times a Week. Pea sized amount  Dispense: 30 g; Refill: 3    2. Recurrent UTI  -     POC Urinalysis Dipstick, " Automated    3. Chronic cystitis        Atrophic vaginitis- premarin refilled    Urinary tract infection-provided reassurance.  Urine dip negative for infection today in office.  Discussed with patient that if symptoms persist, she should call office so that culture may be obtained next week.  Encouraged again behavioral modifications including fluid management, hydration, not delaying urgency when she needs to void.     Discussed with patient the importance of obtaining urine culture prior to treatment with antibiotics for urinary tract infection. - she will call if symptoms so that culture may be obtained prior to starting treatment    Follow-up in 1 year or earlier as needed.  All questions addressed.          Signed:  Leticia Bustillos MD  10/21/21  12:06 EDT      MDM moderate: 2 chronic illnesses; prescription drug management

## 2021-10-28 ENCOUNTER — ANESTHESIA (OUTPATIENT)
Dept: GASTROENTEROLOGY | Facility: HOSPITAL | Age: 71
End: 2021-10-28

## 2021-10-28 ENCOUNTER — ANESTHESIA EVENT (OUTPATIENT)
Dept: GASTROENTEROLOGY | Facility: HOSPITAL | Age: 71
End: 2021-10-28

## 2021-10-28 ENCOUNTER — HOSPITAL ENCOUNTER (OUTPATIENT)
Facility: HOSPITAL | Age: 71
Setting detail: HOSPITAL OUTPATIENT SURGERY
Discharge: HOME | End: 2021-10-28
Attending: INTERNAL MEDICINE | Admitting: INTERNAL MEDICINE

## 2021-10-28 VITALS
RESPIRATION RATE: 18 BRPM | BODY MASS INDEX: 25 KG/M2 | TEMPERATURE: 97.3 F | WEIGHT: 136.69 LBS | SYSTOLIC BLOOD PRESSURE: 139 MMHG | DIASTOLIC BLOOD PRESSURE: 64 MMHG | HEART RATE: 54 BPM | OXYGEN SATURATION: 98 %

## 2021-10-28 DIAGNOSIS — R10.13 EPIGASTRIC PAIN: ICD-10-CM

## 2021-10-28 DIAGNOSIS — K21.9 GASTROESOPHAGEAL REFLUX DISEASE, UNSPECIFIED WHETHER ESOPHAGITIS PRESENT: ICD-10-CM

## 2021-10-28 PROCEDURE — 88305 TISSUE EXAM BY PATHOLOGIST: CPT | Performed by: INTERNAL MEDICINE

## 2021-10-28 PROCEDURE — 43239 EGD BIOPSY SINGLE/MULTIPLE: CPT | Performed by: INTERNAL MEDICINE

## 2021-10-28 PROCEDURE — 25010000002 PROPOFOL 10 MG/ML EMULSION: Performed by: NURSE ANESTHETIST, CERTIFIED REGISTERED

## 2021-10-28 RX ORDER — LIDOCAINE HYDROCHLORIDE 20 MG/ML
INJECTION, SOLUTION INFILTRATION; PERINEURAL AS NEEDED
Status: DISCONTINUED | OUTPATIENT
Start: 2021-10-28 | End: 2021-10-28 | Stop reason: SURG

## 2021-10-28 RX ORDER — SODIUM CHLORIDE, SODIUM LACTATE, POTASSIUM CHLORIDE, CALCIUM CHLORIDE 600; 310; 30; 20 MG/100ML; MG/100ML; MG/100ML; MG/100ML
30 INJECTION, SOLUTION INTRAVENOUS CONTINUOUS
Status: DISCONTINUED | OUTPATIENT
Start: 2021-10-28 | End: 2021-10-28 | Stop reason: HOSPADM

## 2021-10-28 RX ORDER — PANTOPRAZOLE SODIUM 20 MG/1
20 TABLET, DELAYED RELEASE ORAL DAILY
Qty: 30 TABLET | Refills: 1 | Status: SHIPPED | OUTPATIENT
Start: 2021-10-28 | End: 2022-04-05

## 2021-10-28 RX ORDER — PROPOFOL 10 MG/ML
VIAL (ML) INTRAVENOUS AS NEEDED
Status: DISCONTINUED | OUTPATIENT
Start: 2021-10-28 | End: 2021-10-28 | Stop reason: SURG

## 2021-10-28 RX ORDER — AMLODIPINE BESYLATE 5 MG/1
TABLET ORAL
Qty: 90 TABLET | Refills: 3 | Status: SHIPPED | OUTPATIENT
Start: 2021-10-28 | End: 2022-09-14

## 2021-10-28 RX ADMIN — LIDOCAINE HYDROCHLORIDE 100 MG: 20 INJECTION, SOLUTION INFILTRATION; PERINEURAL at 11:59

## 2021-10-28 RX ADMIN — PROPOFOL 100 MG: 10 INJECTION, EMULSION INTRAVENOUS at 11:59

## 2021-10-28 RX ADMIN — PROPOFOL 70 MG: 10 INJECTION, EMULSION INTRAVENOUS at 12:02

## 2021-10-28 RX ADMIN — SODIUM CHLORIDE, POTASSIUM CHLORIDE, SODIUM LACTATE AND CALCIUM CHLORIDE: 600; 310; 30; 20 INJECTION, SOLUTION INTRAVENOUS at 11:53

## 2021-10-28 NOTE — ANESTHESIA POSTPROCEDURE EVALUATION
Patient: Yohana Casillas    Procedure Summary     Date: 10/28/21 Room / Location: [facility] ENDOSCOPY 2 / [facility]    Anesthesia Start: 1153 Anesthesia Stop: 1207    Procedure: ESOPHAGOGASTRODUODENOSCOPY with biopsies (N/A ) Diagnosis:       Epigastric pain      Gastroesophageal reflux disease, unspecified whether esophagitis present      (Epigastric pain [R10.13])      (Gastroesophageal reflux disease, unspecified whether esophagitis present [K21.9])    Surgeons: Nadia Sanz MD Provider: Cristofer Shen MD    Anesthesia Type: general ASA Status: 2          Anesthesia Type: general    Vitals  Vitals Value Taken Time   /60 10/28/21 1223   Temp 36.6 °C (97.8 °F) 10/28/21 1211   Pulse 48 10/28/21 1224   Resp 16 10/28/21 1221   SpO2 97 % 10/28/21 1224   Vitals shown include unvalidated device data.        Post Anesthesia Care and Evaluation    Patient location during evaluation: bedside  Patient participation: complete - patient participated  Level of consciousness: awake  Pain score: 0  Pain management: adequate  Airway patency: patent  Anesthetic complications: No anesthetic complications  PONV Status: none  Cardiovascular status: acceptable and stable  Respiratory status: acceptable and room air  Hydration status: acceptable    Comments: An Anesthesiologist personally participated in the most demanding procedures (including induction and emergence if applicable) in the anesthesia plan, monitored the course of anesthesia administration at frequent intervals and remained physically present and available for immediate diagnosis and treatment of emergencies.

## 2021-10-28 NOTE — ANESTHESIA PREPROCEDURE EVALUATION
Anesthesia Evaluation     Patient summary reviewed and Nursing notes reviewed   no history of anesthetic complications:  NPO Solid Status: > 8 hours  NPO Liquid Status: > 2 hours           Airway   Mallampati: II  TM distance: >3 FB  Neck ROM: full  No difficulty expected  Dental    (+) upper dentures    Pulmonary - normal exam    breath sounds clear to auscultation  (+) shortness of breath,   Cardiovascular - normal exam  Exercise tolerance: good (4-7 METS)    Rhythm: regular    (+) hypertension, valvular problems/murmurs, hyperlipidemia,       Neuro/Psych  (+) psychiatric history,     GI/Hepatic/Renal/Endo    (+)  GERD,  diabetes mellitus,     Musculoskeletal (-) negative ROS    Abdominal    Substance History - negative use     OB/GYN negative ob/gyn ROS         Other      history of cancer                  Anesthesia Plan    ASA 2     general   (Total IV Anesthesia    Patient understands anesthesia not responsible for dental damage.  )  intravenous induction     Anesthetic plan, all risks, benefits, and alternatives have been provided, discussed and informed consent has been obtained with: patient.    Plan discussed with CRNA.

## 2021-10-29 LAB
CYTO UR: NORMAL
LAB AP CASE REPORT: NORMAL
LAB AP CLINICAL INFORMATION: NORMAL
PATH REPORT.FINAL DX SPEC: NORMAL
PATH REPORT.GROSS SPEC: NORMAL

## 2021-11-01 ENCOUNTER — TELEPHONE (OUTPATIENT)
Dept: GASTROENTEROLOGY | Facility: CLINIC | Age: 71
End: 2021-11-01

## 2021-11-03 ENCOUNTER — OFFICE VISIT (OUTPATIENT)
Dept: FAMILY MEDICINE CLINIC | Age: 71
End: 2021-11-03

## 2021-11-03 VITALS
HEART RATE: 51 BPM | WEIGHT: 138 LBS | BODY MASS INDEX: 25.24 KG/M2 | SYSTOLIC BLOOD PRESSURE: 121 MMHG | DIASTOLIC BLOOD PRESSURE: 55 MMHG

## 2021-11-03 DIAGNOSIS — Z23 ENCOUNTER FOR IMMUNIZATION: Primary | ICD-10-CM

## 2021-11-03 DIAGNOSIS — E10.65 CONTROLLED TYPE 1 DIABETES MELLITUS WITH HYPERGLYCEMIA, WITH LONG-TERM CURRENT USE OF INSULIN (HCC): ICD-10-CM

## 2021-11-03 DIAGNOSIS — Z78.0 POSTMENOPAUSAL: ICD-10-CM

## 2021-11-03 DIAGNOSIS — Z00.00 ROUTINE GENERAL MEDICAL EXAMINATION AT A HEALTH CARE FACILITY: ICD-10-CM

## 2021-11-03 DIAGNOSIS — Z12.31 ENCOUNTER FOR SCREENING MAMMOGRAM FOR MALIGNANT NEOPLASM OF BREAST: ICD-10-CM

## 2021-11-03 PROCEDURE — 1170F FXNL STATUS ASSESSED: CPT | Performed by: NURSE PRACTITIONER

## 2021-11-03 PROCEDURE — 1159F MED LIST DOCD IN RCRD: CPT | Performed by: NURSE PRACTITIONER

## 2021-11-03 PROCEDURE — G0008 ADMIN INFLUENZA VIRUS VAC: HCPCS | Performed by: NURSE PRACTITIONER

## 2021-11-03 PROCEDURE — 90662 IIV NO PRSV INCREASED AG IM: CPT | Performed by: NURSE PRACTITIONER

## 2021-11-03 PROCEDURE — G0439 PPPS, SUBSEQ VISIT: HCPCS | Performed by: NURSE PRACTITIONER

## 2021-11-03 PROCEDURE — 96160 PT-FOCUSED HLTH RISK ASSMT: CPT | Performed by: NURSE PRACTITIONER

## 2021-11-03 NOTE — ASSESSMENT & PLAN NOTE
Advise regular exercise, healthy eating, always wear seat belts. Living will, fall prevention discussed.  Immunizations discussed.   To continue yearly optometry and dental exams.    Bring in a copy of living will, get her covid booster in the near future   Consider shingrex in future, will check on updating her with a pneumonia vaccine   Getting flu vaccine today

## 2021-11-03 NOTE — PROGRESS NOTES
The ABCs of the Annual Wellness Visit  Subsequent Medicare Wellness Visit    Chief Complaint   Patient presents with   • Medicare Wellness-subsequent      Subjective    History of Present Illness:  Yohana Casillas is a 71 y.o. female who presents for a Subsequent Medicare Wellness Visit.      Medicare wellness HPI  Exercises regularly: yes at least 3 days a week   Eats healthy: yes   Last mammogram: was getting them through Dr Munoz, overdue   Last DEXA: due   Last pap smear:s/p hysterectomy   BSE: yes   Wears seatbelts:yes   Living will:yes, to bring in a copy   Optometrist:Dr Mac, due next year   Dentist:Dr Lindsay  Alcohol intake:none   Drugs:none   Falls:none   Colonoscopy: due at 72/ last on , dr lundy   Immunizations: has had covid vaccines over 6 months, pfizer, pneum 23, Tdap 7-2012, no shingrex vaccines, but had zostavac       PAST MEDICAL HISTORY : changes since 6-2021,:  Had a procedure last week: upper GI endoscopy 10-28-21    Hyperlipidemia: Hypercholesterolemia;   Hypertension   Type 1 Diabetes: controlled;   retinopathy, now being monitored, per Dr. Johnny MCKEON +    Hospitalizations: uti and drug reaction , at [facility]     CURRENT MEDICAL PROVIDERS:  Cardiologist: Reese  Urologist: dr sara Key NP/CDE     PREVENTIVE HEALTH MAINTENANCE       BONE DENSITY: was last done 2010 osteopenia   COLORECTAL CANCER SCREENING: colonoscopy with normal results   Hepatitis C Medicare Screening: was last done    MAMMOGRAM: was last done 9/2016 with the following abnormalaties noted-- required more images on left breast   PAP SMEAR: hysterectomy     Surgical History:     Hysterectomy: Partial;   thyroidplasty 12-22-11   Thyroidectomy: small portion remains;   Bilateral Tubal Ligation  Bilateral Carpal Tunnel;  right index finger, trigger release and right wrist cyst removed 12-21-11; Procedures: colonoscopy 2004   Cataract Removal: bilateral; 9&10- 2016;    Cholecystectomy: laparoscopic; 19;   Procedures:  Colonoscopy ( 16 )  EGD ( 14 ) left vocal cord surgery 19 & 10-28-21    Family History:     Positive for Hypertension ( brother ).    Positive for Lung Cancer ( father; mother ).    Positive for Seizure Disorder ( brother ).  Father:  at age 63; Cause of death was lung cancer   Mother:  at age 69; Cause of death was adrenal cancer;  Lung Cancer     Social History:   Occupation: [employer] de[employer] 3-4 days a week   Marital Status:    Children: 2 children     The following portions of the patient's history were reviewed and   updated as appropriate: allergies, current medications, past family history, past medical history, past social history and past surgical history.    Compared to one year ago, the patient feels her physical   health is the same.    Compared to one year ago, the patient feels her mental   health is the same.    Recent Hospitalizations:  She was not admitted to the hospital during the last year.       Current Medical Providers:  Patient Care Team:  Shanthi Caceres APRN as PCP - General (Family Medicine)  Leticia Bustillos MD as Consulting Physician (Urology)    Outpatient Medications Prior to Visit   Medication Sig Dispense Refill   • amLODIPine (NORVASC) 5 MG tablet TAKE 1 TABLET EVERY DAY 90 tablet 3   • aspirin (aspirin) 81 MG EC tablet Aspirin Low Dose 81 mg oral tablet,delayed release (DR/EC) take 1 tablet (81 mg) by oral route once daily   Active     • conjugated estrogens (Premarin) 0.625 MG/GM vaginal cream Insert  into the vagina 2 (Two) Times a Week. Pea sized amount 30 g 3   • escitalopram (LEXAPRO) 10 MG tablet Take 1 tablet by mouth Daily for 90 days. 90 tablet 1   • hydroCHLOROthiazide (HYDRODIURIL) 25 MG tablet hydrochlorothiazide 25 mg oral tablet TAKE 1 TABLET EVERY DAY 2021  Active     • insulin aspart (novoLOG) 100 UNIT/ML injection      • levothyroxine (SYNTHROID, LEVOTHROID)  75 MCG tablet TAKE ONE TABLET BY MOUTH DAILY 90 tablet 1   • lisinopril (PRINIVIL,ZESTRIL) 40 MG tablet      • NovoLOG 100 UNIT/ML injection USE AS DIRECTED IN INSULIN PUMP MAX 65 UNITS PER DAY 20 each 5   • pantoprazole (PROTONIX) 20 MG EC tablet Take 1 tablet by mouth Daily. 30 tablet 1   • simvastatin (ZOCOR) 20 MG tablet Take 1 tablet by mouth Every Night for 90 days. 90 tablet 1   • estradiol (Vagifem) 10 MCG tablet vaginal tablet 1 ea     • hydroCHLOROthiazide (HYDRODIURIL) 25 MG tablet Daily.       No facility-administered medications prior to visit.       No opioid medication identified on active medication list. I have reviewed chart for other potential  high risk medication/s and harmful drug interactions in the elderly.          Aspirin is on active medication list. Aspirin use is indicated based on review of current medical condition/s. Pros and cons of this therapy have been discussed today. Benefits of this medication outweigh potential harm.  Patient has been encouraged to continue taking this medication.  .      Patient Active Problem List   Diagnosis   • Anxiety   • Hypertension, essential   • Gastroesophageal reflux disease   • Heart murmur   • Hyperlipidemia   • Seasonal allergic rhinitis   • Type 1 diabetes mellitus (HCC)   • Vocal cord paralysis   • Epigastric pain   • Palpitations   • Shortness of breath   • Atrophic vaginitis   • Dysphagia   • Foot pain, right   • Other allergy, initial encounter   • Hypercholesterolemia   • Recurrent UTI   • Chronic cystitis   • Routine general medical examination at a health care facility   • Encounter for immunization   • Postmenopausal   • Encounter for screening mammogram for malignant neoplasm of breast   • Controlled type 1 diabetes mellitus with hyperglycemia, with long-term current use of insulin (HCC)     Advance Care Planning  Advance Directive is not on file.  ACP discussion was held with the patient during this visit. Patient has an advance  directive (not in EMR), copy requested.          Objective    Vitals:    11/03/21 1356   BP: 121/55   BP Location: Right arm   Patient Position: Sitting   Pulse: 51   Weight: 62.6 kg (138 lb)     BMI Readings from Last 1 Encounters:   11/03/21 25.24 kg/m²   BMI is above normal parameters. Recommendations include: exercise counseling and nutrition counseling    Does the patient have evidence of cognitive impairment? No    Physical Exam  Vitals reviewed.   Constitutional:       Appearance: Normal appearance. She is well-developed.   HENT:      Head: Normocephalic and atraumatic.      Right Ear: External ear normal.      Left Ear: External ear normal.      Mouth/Throat:      Pharynx: No oropharyngeal exudate.   Eyes:      Conjunctiva/sclera: Conjunctivae normal.      Pupils: Pupils are equal, round, and reactive to light.   Cardiovascular:      Rate and Rhythm: Normal rate and regular rhythm.      Heart sounds: No murmur heard.  No friction rub. No gallop.    Pulmonary:      Effort: Pulmonary effort is normal.      Breath sounds: Normal breath sounds. No wheezing or rhonchi.   Chest:   Breasts:      Right: Normal. No mass, nipple discharge or axillary adenopathy.      Left: Normal. No mass, nipple discharge or axillary adenopathy.       Abdominal:      General: Bowel sounds are normal. There is no distension.      Palpations: Abdomen is soft.      Tenderness: There is no abdominal tenderness.   Lymphadenopathy:      Upper Body:      Right upper body: No axillary adenopathy.      Left upper body: No axillary adenopathy.   Skin:     General: Skin is warm and dry.   Neurological:      Mental Status: She is alert and oriented to person, place, and time.      Cranial Nerves: No cranial nerve deficit.   Psychiatric:         Mood and Affect: Mood and affect normal.         Behavior: Behavior normal.         Thought Content: Thought content normal.         Judgment: Judgment normal.       Lab Results   Component Value Date     TRIG 64 09/13/2021    HDL 71 (H) 09/13/2021    LDL 81 09/13/2021    VLDL 13 09/13/2021    HGBA1C 6.85 (H) 09/13/2021            HEALTH RISK ASSESSMENT    Smoking Status:  Social History     Tobacco Use   Smoking Status Never Smoker   Smokeless Tobacco Never Used     Alcohol Consumption:  Social History     Substance and Sexual Activity   Alcohol Use Not Currently     Fall Risk Screen:    IVETT Fall Risk Assessment has not been completed.    Depression Screening:  PHQ-2/PHQ-9 Depression Screening 6/30/2021   Little interest or pleasure in doing things 0   Feeling down, depressed, or hopeless 0   Total Score 0       Health Habits and Functional and Cognitive Screening:  Functional & Cognitive Status 11/3/2021   Do you have difficulty preparing food and eating? No   Do you have difficulty bathing yourself, getting dressed or grooming yourself? No   Do you have difficulty using the toilet? No   Do you have difficulty moving around from place to place? No   Do you have trouble with steps or getting out of a bed or a chair? No   Current Diet Diabetic Diet   Dental Exam Up to date   Eye Exam Up to date   Exercise (times per week) 2 times per week   Current Exercises Include Walking   Do you need help using the phone?  No   Are you deaf or do you have serious difficulty hearing?  No   Do you need help with transportation? No   Do you need help shopping? No   Do you need help preparing meals?  No   Do you need help with housework?  No   Do you need help with laundry? No   Do you need help taking your medications? No   Do you need help managing money? No   Do you ever drive or ride in a car without wearing a seat belt? No   Have you felt unusual stress, anger or loneliness in the last month? No   Who do you live with? Spouse   If you need help, do you have trouble finding someone available to you? No   Have you been bothered in the last four weeks by sexual problems? No   Do you have difficulty concentrating, remembering or  making decisions? No       Age-appropriate Screening Schedule:  Refer to the list below for future screening recommendations based on patient's age, sex and/or medical conditions. Orders for these recommended tests are listed in the plan section. The patient has been provided with a written plan.    Health Maintenance   Topic Date Due   • DXA SCAN  Never done   • ZOSTER VACCINE (2 of 3) 10/30/2013   • URINE MICROALBUMIN  05/05/2021   • DIABETIC FOOT EXAM  05/19/2021   • HEMOGLOBIN A1C  03/13/2022   • TDAP/TD VACCINES (2 - Td or Tdap) 07/11/2022   • LIPID PANEL  09/13/2022   • DIABETIC EYE EXAM  10/05/2022   • MAMMOGRAM  02/10/2023   • INFLUENZA VACCINE  Completed              Assessment/Plan   CMS Preventative Services Quick Reference  Risk Factors Identified During Encounter  Immunizations Discussed/Encouraged (specific Immunizations; Influenza and COVID19  The above risks/problems have been discussed with the patient.  Follow up actions/plans if indicated are seen below in the Assessment/Plan Section.  Pertinent information has been shared with the patient in the After Visit Summary.    Diagnoses and all orders for this visit:    1. Encounter for immunization (Primary)  -     Fluzone High-Dose 65+yrs    2. Routine general medical examination at a health care facility  Assessment & Plan:  Advise regular exercise, healthy eating, always wear seat belts. Living will, fall prevention discussed.  Immunizations discussed.   To continue yearly optometry and dental exams.    Bring in a copy of living will, get her covid booster in the near future   Consider shingrex in future, will check on updating her with a pneumonia vaccine   Getting flu vaccine today       3. Postmenopausal  -     Mammo Screening Digital Tomosynthesis Bilateral With CAD; Future  -     DEXA Bone Density Axial; Future    4. Encounter for screening mammogram for malignant neoplasm of breast  -     Mammo Screening Digital Tomosynthesis Bilateral With  CAD; Future    5. Controlled type 1 diabetes mellitus with hyperglycemia, with long-term current use of insulin (HCC)  Assessment & Plan:  Reviewed last few labs and last endo note with pt         Follow Up:   Return for follow up pending x-ray result.     An After Visit Summary and PPPS were made available to the patient.

## 2021-11-05 RX ORDER — HYDROCHLOROTHIAZIDE 25 MG/1
TABLET ORAL
Qty: 90 TABLET | Refills: 3 | Status: SHIPPED | OUTPATIENT
Start: 2021-11-05 | End: 2022-09-14

## 2021-11-05 RX ORDER — LISINOPRIL 40 MG/1
TABLET ORAL
Qty: 90 TABLET | Refills: 3 | Status: SHIPPED | OUTPATIENT
Start: 2021-11-05 | End: 2022-09-14

## 2021-11-19 DIAGNOSIS — E78.2 MIXED HYPERLIPIDEMIA: ICD-10-CM

## 2021-11-19 DIAGNOSIS — F41.9 ANXIETY: ICD-10-CM

## 2021-11-19 RX ORDER — SIMVASTATIN 20 MG
TABLET ORAL
Qty: 90 TABLET | Refills: 1 | Status: SHIPPED | OUTPATIENT
Start: 2021-11-19 | End: 2022-04-18

## 2021-11-19 RX ORDER — ESCITALOPRAM OXALATE 10 MG/1
TABLET ORAL
Qty: 90 TABLET | Refills: 1 | Status: SHIPPED | OUTPATIENT
Start: 2021-11-19 | End: 2022-04-18

## 2021-11-22 ENCOUNTER — HOSPITAL ENCOUNTER (OUTPATIENT)
Dept: MAMMOGRAPHY | Facility: HOSPITAL | Age: 71
Discharge: HOME | End: 2021-11-22

## 2021-11-22 DIAGNOSIS — Z78.0 POSTMENOPAUSAL: ICD-10-CM

## 2021-11-22 DIAGNOSIS — Z12.31 ENCOUNTER FOR SCREENING MAMMOGRAM FOR MALIGNANT NEOPLASM OF BREAST: ICD-10-CM

## 2021-11-29 ENCOUNTER — HOSPITAL ENCOUNTER (OUTPATIENT)
Dept: BONE DENSITY | Facility: HOSPITAL | Age: 71
Discharge: HOME | End: 2021-11-29
Admitting: NURSE PRACTITIONER

## 2021-11-29 DIAGNOSIS — Z78.0 POSTMENOPAUSAL: ICD-10-CM

## 2021-11-29 PROCEDURE — 77080 DXA BONE DENSITY AXIAL: CPT

## 2021-12-10 ENCOUNTER — OFFICE VISIT (OUTPATIENT)
Dept: DIABETES SERVICES | Facility: CLINIC | Age: 71
End: 2021-12-10

## 2021-12-10 VITALS
OXYGEN SATURATION: 96 % | HEIGHT: 62 IN | TEMPERATURE: 98.3 F | HEART RATE: 54 BPM | SYSTOLIC BLOOD PRESSURE: 116 MMHG | RESPIRATION RATE: 22 BRPM | DIASTOLIC BLOOD PRESSURE: 55 MMHG | BODY MASS INDEX: 25.4 KG/M2 | WEIGHT: 138 LBS

## 2021-12-10 DIAGNOSIS — E10.9 CONTROLLED DIABETES MELLITUS TYPE 1 WITHOUT COMPLICATIONS (HCC): Primary | ICD-10-CM

## 2021-12-10 LAB
EXPIRATION DATE: ABNORMAL
HBA1C MFR BLD: 6.9 %
Lab: ABNORMAL

## 2021-12-10 PROCEDURE — 99213 OFFICE O/P EST LOW 20 MIN: CPT | Performed by: NURSE PRACTITIONER

## 2021-12-10 PROCEDURE — 83036 HEMOGLOBIN GLYCOSYLATED A1C: CPT | Performed by: NURSE PRACTITIONER

## 2021-12-10 PROCEDURE — 95251 CONT GLUC MNTR ANALYSIS I&R: CPT | Performed by: NURSE PRACTITIONER

## 2021-12-10 PROCEDURE — 3044F HG A1C LEVEL LT 7.0%: CPT | Performed by: NURSE PRACTITIONER

## 2021-12-10 NOTE — PROGRESS NOTES
Chief Complaint  Diabetes (follow up and med refills )    Referred By: No ref. provider found    Subjective          Yohana Casillas presents to [facility] for insulin pump management    History of Present Illness    Visit type:  follow-up  Diabetes type:  Type 1  Current diabetes status/concerns/issues: She has been having some low glucose levels.  She states that she will note that her glucose level is going high and although the pump is trying to correct the high glucose level using the control IQ technology, she will go ahead and take an additional bolus and then will go too low afterwards  Other health concerns: None reported  Diabetes symptoms:    Polyuria: No   Polydipsia: No   Polyphagia: No   Blurred vision: No   Excessive fatigue: No  Diabetes complications:  Neuropathy:No  Nephropathy:No  Retinopathy:No  Amputation/Wounds:No  Gastroparesis:No  Cardiovascular Disease:Yes, Hypertension and hyperlipidemia  Erectile Dysfunction:N/A  Hypoglycemia:  Level 1 hypoglycemia (54 mg/dL - 70 mg/dL); Frequency - The CGM indicates 4% of glucose levels are falling below target  Hypoglycemia Symptoms:  sweating and weakness  Current diabetes treatment: She is currently managed on a tandem insulin pump.  She is using aspart insulin approximately 30 units each day  Blood glucose device:  Dexcom CGM  Blood glucose monitoring frequency:  Continuous per CGM  Blood glucose range/average: The continuous glucose sensor indicates an average glucose of 147 mg/dL with a high of 318 and a low of 40 mg/dL.  73% of glucose levels were found in target range between  while 23% are above target and 4% below target.  Diet:  Carbohydrate Counting, Avoids high carb/sweet foods, Diet drinks only  Activity/Exercise:  Walking    Past Medical History:   Diagnosis Date   • Anxiety    • Cancer (HCC)    • Diabetes mellitus type I (HCC)    • GERD (gastroesophageal reflux disease)    • History of thyroplasty  07/2021   • Hyperlipidemia    • Hypertension    • Retinopathy    • Thyroid disease      Past Surgical History:   Procedure Laterality Date   • CARPAL TUNNEL RELEASE     • CHOLECYSTECTOMY     • COLONOSCOPY     • ENDOSCOPY N/A 10/28/2021    Procedure: ESOPHAGOGASTRODUODENOSCOPY with biopsies;  Surgeon: Nadia Sanz MD;  Location: [facility] END[facility];  Service: Gastroenterology;  Laterality: N/A;  esophagitis and gastritis   • HYSTERECTOMY     • THYROID SURGERY     • UPPER GASTROINTESTINAL ENDOSCOPY       Family History   Problem Relation Age of Onset   • Diabetes type II Other    • Malig Hyperthermia Neg Hx      Social History     Socioeconomic History   • Marital status:    Tobacco Use   • Smoking status: Never Smoker   • Smokeless tobacco: Never Used   Vaping Use   • Vaping Use: Never used   Substance and Sexual Activity   • Alcohol use: Not Currently   • Drug use: Never   • Sexual activity: Defer     Allergies   Allergen Reactions   • Sulfa Antibiotics Hives   • Levofloxacin Rash       Current Outpatient Medications:   •  amLODIPine (NORVASC) 5 MG tablet, TAKE 1 TABLET EVERY DAY, Disp: 90 tablet, Rfl: 3  •  aspirin (aspirin) 81 MG EC tablet, Aspirin Low Dose 81 mg oral tablet,delayed release (DR/EC) take 1 tablet (81 mg) by oral route once daily   Active, Disp: , Rfl:   •  conjugated estrogens (Premarin) 0.625 MG/GM vaginal cream, Insert  into the vagina 2 (Two) Times a Week. Pea sized amount, Disp: 30 g, Rfl: 3  •  escitalopram (LEXAPRO) 10 MG tablet, TAKE 1 TABLET EVERY DAY, Disp: 90 tablet, Rfl: 1  •  hydroCHLOROthiazide (HYDRODIURIL) 25 MG tablet, TAKE 1 TABLET EVERY DAY, Disp: 90 tablet, Rfl: 3  •  insulin aspart (novoLOG) 100 UNIT/ML injection, , Disp: , Rfl:   •  levothyroxine (SYNTHROID, LEVOTHROID) 75 MCG tablet, TAKE ONE TABLET BY MOUTH DAILY, Disp: 90 tablet, Rfl: 1  •  lisinopril (PRINIVIL,ZESTRIL) 40 MG tablet, TAKE 1 TABLET EVERY DAY, Disp: 90 tablet, Rfl: 3  •  pantoprazole (PROTONIX)  "20 MG EC tablet, Take 1 tablet by mouth Daily., Disp: 30 tablet, Rfl: 1  •  simvastatin (ZOCOR) 20 MG tablet, TAKE 1 TABLET EVERY NIGHT, Disp: 90 tablet, Rfl: 1  •  hydroCHLOROthiazide (HYDRODIURIL) 25 MG tablet, hydrochlorothiazide 25 mg oral tablet TAKE 1 TABLET EVERY DAY 1/26/2021  Active, Disp: , Rfl:   •  NovoLOG 100 UNIT/ML injection, USE AS DIRECTED IN INSULIN PUMP MAX 65 UNITS PER DAY, Disp: 20 each, Rfl: 5    Review of Systems   Constitutional: Negative for activity change, appetite change, fatigue, fever, unexpected weight gain and unexpected weight loss.   HENT: Negative for congestion, ear pain, facial swelling, hearing loss, sore throat and tinnitus.    Eyes: Negative for blurred vision, double vision, redness and visual disturbance.   Respiratory: Negative for cough, shortness of breath and wheezing.    Cardiovascular: Negative for chest pain, palpitations and leg swelling.   Gastrointestinal: Negative for abdominal distention, constipation, diarrhea, nausea, vomiting, GERD and indigestion.   Endocrine: Negative for polydipsia, polyphagia and polyuria.   Genitourinary: Negative for difficulty urinating, frequency and urgency.   Musculoskeletal: Negative for back pain, gait problem and myalgias.   Skin: Negative for rash, skin lesions and bruise.   Neurological: Negative for seizures, speech difficulty, weakness, headache and confusion.   Psychiatric/Behavioral: Negative for sleep disturbance, depressed mood and stress. The patient is not nervous/anxious.         Objective     Vitals:    12/10/21 1411   BP: 116/55   BP Location: Right arm   Patient Position: Sitting   Cuff Size: Adult   Pulse: 54   Resp: 22   Temp: 98.3 °F (36.8 °C)   SpO2: 96%   Weight: 62.6 kg (138 lb)   Height: 157.5 cm (62\")   PainSc: 0-No pain     Body mass index is 25.24 kg/m².    Physical Exam  Constitutional:       Appearance: Normal appearance.      Comments: Overweight with BMI of 25.24   HENT:      Head: Normocephalic and " atraumatic.      Right Ear: External ear normal.      Left Ear: External ear normal.      Nose: Nose normal.   Eyes:      Extraocular Movements: Extraocular movements intact.      Conjunctiva/sclera: Conjunctivae normal.   Pulmonary:      Effort: Pulmonary effort is normal.   Musculoskeletal:         General: Normal range of motion.      Cervical back: Normal range of motion.   Skin:     General: Skin is warm and dry.   Neurological:      General: No focal deficit present.      Mental Status: She is alert and oriented to person, place, and time. Mental status is at baseline.   Psychiatric:         Mood and Affect: Mood normal.         Behavior: Behavior normal.         Thought Content: Thought content normal.         Judgment: Judgment normal.         Result Review :   The following data was reviewed by: JACKIE Qureshi on 12/10/2021:    Point-of-care A1c collected in the office today was 6.9 Indicating controlled type 1 diabetes.  This is up very slightly from the prior result of 6.85 collected in September    Most Recent A1C    HGBA1C Most Recent 12/10/21   Hemoglobin A1C 6.9             A1C Last 3 Results    HGBA1C Last 3 Results 5/26/21 9/13/21 12/10/21   Hemoglobin A1C 7.2 (A) 6.85 (A) 6.9   (A) Abnormal value       Comments are available for some flowsheets but are not being displayed.                   Assessment: Patient's A1c remains controlled.  She is having some episodes of hypoglycemia because she is overtreating hyperglycemia.      Diagnoses and all orders for this visit:    1. Controlled diabetes mellitus type 1 without complications (HCC) (Primary)  -     POC Glycosylated Hemoglobin (Hb A1C)        Plan: No changes were made to her pump settings today.  The patient is urged to ensure the proper amount of carbohydrates to prevent hypoglycemia and avoid over correcting hyperglycemia to prevent hypoglycemia    The patient will monitor her blood glucose levels using her continuous glucose  sensor.  If she develops problematic hyperglycemia or hypoglycemia or adverse drug reaction, she will contact the office for further instructions.        Follow Up     Return in about 3 months (around 3/10/2022) for Pump Eval, CGM Follow-up.    Patient was given instructions and counseling regarding her condition or for health maintenance advice. Please see specific information pulled into the AVS if appropriate.     Ame Key, JACKIE  12/10/2021

## 2022-01-03 ENCOUNTER — OFFICE VISIT (OUTPATIENT)
Dept: FAMILY MEDICINE CLINIC | Age: 72
End: 2022-01-03

## 2022-01-03 VITALS
HEIGHT: 62 IN | HEART RATE: 58 BPM | WEIGHT: 137 LBS | TEMPERATURE: 97.8 F | BODY MASS INDEX: 25.21 KG/M2 | OXYGEN SATURATION: 97 % | SYSTOLIC BLOOD PRESSURE: 137 MMHG | DIASTOLIC BLOOD PRESSURE: 64 MMHG

## 2022-01-03 DIAGNOSIS — E78.2 MIXED HYPERLIPIDEMIA: ICD-10-CM

## 2022-01-03 DIAGNOSIS — F41.9 ANXIETY: ICD-10-CM

## 2022-01-03 DIAGNOSIS — E10.9 TYPE 1 DIABETES MELLITUS WITHOUT COMPLICATION: ICD-10-CM

## 2022-01-03 DIAGNOSIS — I10 HYPERTENSION, ESSENTIAL: Primary | ICD-10-CM

## 2022-01-03 DIAGNOSIS — K21.9 GASTROESOPHAGEAL REFLUX DISEASE WITHOUT ESOPHAGITIS: ICD-10-CM

## 2022-01-03 DIAGNOSIS — E03.9 ACQUIRED HYPOTHYROIDISM: ICD-10-CM

## 2022-01-03 DIAGNOSIS — R01.1 HEART MURMUR: ICD-10-CM

## 2022-01-03 PROCEDURE — 99214 OFFICE O/P EST MOD 30 MIN: CPT | Performed by: NURSE PRACTITIONER

## 2022-01-03 NOTE — PROGRESS NOTES
Yohana Casillas presents to [facility].    Chief Complaint:  Follow-up (6 month ) and Hypertension         History of Present Illness:  Hypertension:  Current medication lisinopril/hctz, norvasc   Tolerating Medication: Yes    Needs refills: No, but uses [facility] mail order   Labs   Lab Results       Component                Value               Date                       GLUCOSE                  73                  05/26/2021                 BUN                      15                  05/26/2021                 CREATININE               0.67                05/26/2021                 BCR                      22 (H)              05/26/2021                 K                        4.2                 05/26/2021                 CO2                      26                  05/26/2021                 CALCIUM                  9.1                 05/26/2021                 ALBUMIN                  4.2                 05/26/2021                 LABIL2                   1.4                 05/26/2021                 AST                      21                  05/26/2021                 ALT                      15                  05/26/2021              Anxiety/ Depression  Current medication/lexapro   Tolerating medication Yes  Current symptoms: rx working     Hyperlipidemia  Current medication zocor   Tolerating medication: Yes  Needs Refill: No    Lab Results       Component                Value               Date                       CHOL                     165                 09/13/2021                 CHLPL                    180                 12/22/2020                 TRIG                     64                  09/13/2021                 HDL                      71 (H)              09/13/2021                 LDL                      81                  09/13/2021                Hypothyroidism  Current rx levothyroxine 75 mcg   Tolerating rx:yes  Refills needed No  Lab Results        Component                Value               Date                       TSH                      2.670               2020               Has diabetes, had an a1C 6.9 last month, hannahs MARLON Key  Saw cardiology and had an echo last month.          PAST MEDICAL HISTORY only an EGD in the last 12 months ()   Got her moderna covid booster at work       Hyperlipidemia: Hypercholesterolemia;     Hypertension     Type 1 Diabetes: controlled;     retinopathy, now being monitored, per Dr. Johnny MCKEON +  Hospitalizations: uti and drug reaction , at [facility]         CURRENT MEDICAL PROVIDERS:    Cardiologist: Reese    Urologist: dr sara Key NP/CDE         PREVENTIVE HEALTH MAINTENANCE             BONE DENSITY: was last done  osteopenia     COLORECTAL CANCER SCREENING: colonoscopy with normal results     Hepatitis C Medicare Screening: was last done      MAMMOGRAM: was last done 2016 with the following abnormalaties noted-- required more images on left breast     PAP SMEAR: hysterectomy         Surgical History:         Hysterectomy: Partial;     thyroidplasty 11     Thyroidectomy: small portion remains;     Bilateral Tubal Ligation    Bilateral Carpal Tunnel;    right index finger, trigger release and right wrist cyst removed 11; Procedures: colonoscopy 2004     Cataract Removal: bilateral; 9&10- 2016;     Cholecystectomy: laparoscopic; 19;     Procedures:    Colonoscopy ( 14 )    EGD ( 14 ) left vocal cord surgery 19         Family History:         Positive for Hypertension ( brother ).      Positive for Lung Cancer ( father; mother ).      Positive for Seizure Disorder ( brother ).  Father:  at age 63; Cause of death was lung cancer     Mother:  at age 69; Cause of death was adrenal cancer;  Lung Cancer         Social History:     Occupation: [employer] de[employer] 3-4 days a week     Marital Status:      Children: 2  "children                Review of Systems:  Review of Systems   Constitutional: Negative for fatigue and fever.   Respiratory: Negative for cough and shortness of breath.    Cardiovascular: Negative for chest pain, palpitations and leg swelling.   Gastrointestinal: Positive for GERD (takes PPI ).   Neurological: Negative for numbness.          Current Outpatient Medications:   •  amLODIPine (NORVASC) 5 MG tablet, TAKE 1 TABLET EVERY DAY, Disp: 90 tablet, Rfl: 3  •  aspirin (aspirin) 81 MG EC tablet, Aspirin Low Dose 81 mg oral tablet,delayed release (DR/EC) take 1 tablet (81 mg) by oral route once daily   Active, Disp: , Rfl:   •  conjugated estrogens (Premarin) 0.625 MG/GM vaginal cream, Insert  into the vagina 2 (Two) Times a Week. Pea sized amount, Disp: 30 g, Rfl: 3  •  escitalopram (LEXAPRO) 10 MG tablet, TAKE 1 TABLET EVERY DAY, Disp: 90 tablet, Rfl: 1  •  hydroCHLOROthiazide (HYDRODIURIL) 25 MG tablet, TAKE 1 TABLET EVERY DAY, Disp: 90 tablet, Rfl: 3  •  insulin aspart (novoLOG) 100 UNIT/ML injection, , Disp: , Rfl:   •  levothyroxine (SYNTHROID, LEVOTHROID) 75 MCG tablet, TAKE ONE TABLET BY MOUTH DAILY, Disp: 90 tablet, Rfl: 1  •  lisinopril (PRINIVIL,ZESTRIL) 40 MG tablet, TAKE 1 TABLET EVERY DAY, Disp: 90 tablet, Rfl: 3  •  pantoprazole (PROTONIX) 20 MG EC tablet, Take 1 tablet by mouth Daily., Disp: 30 tablet, Rfl: 1  •  simvastatin (ZOCOR) 20 MG tablet, TAKE 1 TABLET EVERY NIGHT, Disp: 90 tablet, Rfl: 1  •  insulin aspart (novoLOG) 100 UNIT/ML injection, , Disp: , Rfl:     Vital Signs:   Vitals:    01/03/22 1449   BP: 137/64   BP Location: Left arm   Patient Position: Sitting   Pulse: 58   Temp: 97.8 °F (36.6 °C)   SpO2: 97%   Weight: 62.1 kg (137 lb)   Height: 157.5 cm (62\")         Physical Exam:  Physical Exam  Vitals reviewed.   Constitutional:       General: She is not in acute distress.     Appearance: Normal appearance.   Neck:      Vascular: No carotid bruit.   Cardiovascular:      Rate and " Rhythm: Normal rate and regular rhythm.      Heart sounds: Normal heart sounds.   Pulmonary:      Effort: Pulmonary effort is normal. No respiratory distress.      Breath sounds: Normal breath sounds.   Musculoskeletal:      Right lower leg: No edema.      Left lower leg: No edema.   Neurological:      Mental Status: She is alert.   Psychiatric:         Mood and Affect: Mood normal.         Behavior: Behavior normal.         Result Review      The following data was reviewed by: JACKIE Rosen on 01/03/2022:    Results for orders placed or performed in visit on 12/11/21   Adult Transthoracic Echo Complete W/ Cont if Necessary Per Protocol   Result Value Ref Range    Target HR (85%) 128 bpm    Max. Pred. HR (100%) 151 bpm    AI Jet height 0.30 cm    AI Jet index 16.00 %    IVRT 81.0 msec    LA Volume Index 21.0 mL/m2    Avg E/e' ratio 13.71     Ao root diam 2.4 cm    EF(MOD-bp) 62.0 %    Lat Peak E' Lewis 7.0 cm/sec    LVPWd 1.0 cm    Med Peak E' Lewis 7.00 cm/sec    MV dec time 187 msec    AI P1/2t 690 msec    IVSd 1.0 cm    LVIDd 4.6 cm    LVIDs 3.0 cm    MV E/A 1.3     MV A dur 148.0 sec    MV E max lewis 96.0 cm/sec    TAPSE (>1.6) 1.90 cm               Assessment and Plan:          Diagnoses and all orders for this visit:    1. Hypertension, essential (Primary)  Assessment & Plan:  Hypertension is stable. Continue to monitor BP at home. Continue current meds. Continue to modify diet and lifestyle. Will need labs every 6 months and follow up.       Orders:  -     TSH; Future  -     Comprehensive Metabolic Panel  -     Lipid Panel    2. Anxiety  Assessment & Plan:  Continue lexapro       3. Mixed hyperlipidemia  Assessment & Plan:  Continue current medication and efforts with diet and exercise.         4. Gastroesophageal reflux disease without esophagitis  Assessment & Plan:  Continue PPI       5. Acquired hypothyroidism  -     TSH; Future    6. Type 1 diabetes mellitus without complication  ([facility])  Assessment & Plan:  Reviewed recent A1C, follow up with diabetes specialist as directed       7. Heart murmur  Assessment & Plan:  Reviewed ECHO from last month           Follow Up   Return for fasting for labs.  Patient was given instructions and counseling regarding her condition or for health maintenance advice. Please see specific information pulled into the AVS if appropriate.

## 2022-01-10 ENCOUNTER — LAB (OUTPATIENT)
Dept: LAB | Facility: HOSPITAL | Age: 72
End: 2022-01-10

## 2022-01-10 DIAGNOSIS — I10 HYPERTENSION, ESSENTIAL: ICD-10-CM

## 2022-01-10 DIAGNOSIS — E03.9 ACQUIRED HYPOTHYROIDISM: ICD-10-CM

## 2022-01-10 LAB
ALBUMIN SERPL-MCNC: 4.3 G/DL (ref 3.5–5.2)
ALBUMIN/GLOB SERPL: 1.7 G/DL
ALP SERPL-CCNC: 75 U/L (ref 39–117)
ALT SERPL W P-5'-P-CCNC: 13 U/L (ref 1–33)
ANION GAP SERPL CALCULATED.3IONS-SCNC: 4.2 MMOL/L (ref 5–15)
AST SERPL-CCNC: 24 U/L (ref 1–32)
BILIRUB SERPL-MCNC: 0.4 MG/DL (ref 0–1.2)
BUN SERPL-MCNC: 14 MG/DL (ref 8–23)
BUN/CREAT SERPL: 19.2 (ref 7–25)
CALCIUM SPEC-SCNC: 9.6 MG/DL (ref 8.6–10.5)
CHLORIDE SERPL-SCNC: 104 MMOL/L (ref 98–107)
CHOLEST SERPL-MCNC: 198 MG/DL (ref 0–200)
CO2 SERPL-SCNC: 29.8 MMOL/L (ref 22–29)
CREAT SERPL-MCNC: 0.73 MG/DL (ref 0.57–1)
GFR SERPL CREATININE-BSD FRML MDRD: 79 ML/MIN/1.73
GLOBULIN UR ELPH-MCNC: 2.6 GM/DL
GLUCOSE SERPL-MCNC: 111 MG/DL (ref 65–99)
HDLC SERPL-MCNC: 87 MG/DL (ref 40–60)
LDLC SERPL CALC-MCNC: 102 MG/DL (ref 0–100)
LDLC/HDLC SERPL: 1.16 {RATIO}
POTASSIUM SERPL-SCNC: 4.4 MMOL/L (ref 3.5–5.2)
PROT SERPL-MCNC: 6.9 G/DL (ref 6–8.5)
SODIUM SERPL-SCNC: 138 MMOL/L (ref 136–145)
TRIGL SERPL-MCNC: 50 MG/DL (ref 0–150)
TSH SERPL DL<=0.05 MIU/L-ACNC: 1.98 UIU/ML (ref 0.27–4.2)
VLDLC SERPL-MCNC: 9 MG/DL (ref 5–40)

## 2022-01-10 PROCEDURE — 80061 LIPID PANEL: CPT | Performed by: NURSE PRACTITIONER

## 2022-01-10 PROCEDURE — 36415 COLL VENOUS BLD VENIPUNCTURE: CPT | Performed by: NURSE PRACTITIONER

## 2022-01-10 PROCEDURE — 84443 ASSAY THYROID STIM HORMONE: CPT

## 2022-01-10 PROCEDURE — 80053 COMPREHEN METABOLIC PANEL: CPT | Performed by: NURSE PRACTITIONER

## 2022-01-17 RX ORDER — LEVOTHYROXINE SODIUM 0.07 MG/1
TABLET ORAL
Qty: 90 TABLET | Refills: 1 | Status: SHIPPED | OUTPATIENT
Start: 2022-01-17 | End: 2022-08-29

## 2022-01-26 ENCOUNTER — OFFICE VISIT (OUTPATIENT)
Dept: PODIATRY | Facility: CLINIC | Age: 72
End: 2022-01-26

## 2022-01-26 VITALS
HEART RATE: 61 BPM | OXYGEN SATURATION: 100 % | SYSTOLIC BLOOD PRESSURE: 123 MMHG | HEIGHT: 62 IN | WEIGHT: 139 LBS | BODY MASS INDEX: 25.58 KG/M2 | DIASTOLIC BLOOD PRESSURE: 59 MMHG | TEMPERATURE: 97.1 F

## 2022-01-26 DIAGNOSIS — Z79.4 TYPE 2 DIABETES MELLITUS WITHOUT COMPLICATION, WITH LONG-TERM CURRENT USE OF INSULIN: Primary | ICD-10-CM

## 2022-01-26 DIAGNOSIS — E11.9 TYPE 2 DIABETES MELLITUS WITHOUT COMPLICATION, WITH LONG-TERM CURRENT USE OF INSULIN: Primary | ICD-10-CM

## 2022-01-26 PROCEDURE — G8404 LOW EXTEMITY NEUR EXAM DOCUM: HCPCS | Performed by: PODIATRIST

## 2022-01-26 PROCEDURE — 99213 OFFICE O/P EST LOW 20 MIN: CPT | Performed by: PODIATRIST

## 2022-01-26 RX ORDER — SODIUM FLUORIDE1.1%, POTASSIUM NITRATE 5% 5.8; 57.5 MG/ML; MG/ML
GEL, DENTIFRICE DENTAL
COMMUNITY
Start: 2022-01-16

## 2022-01-26 NOTE — PROGRESS NOTES
[facility] - PODIATRY    Today's Date: 01/26/22    Patient Name: Yohana Casillas  MRN: [id number]  CSN: [id number]  PCP: Shanthi Caceres APRN, Last PCP Visit: 16 January 2022  Referring Provider: No ref. provider found    SUBJECTIVE     Chief Complaint   Patient presents with   • Left Foot - Diabetes, Annual Exam   • Right Foot - Diabetes, Annual Exam     HPI: Yohana Casillas, a 71 y.o.female, presents to clinic for a diabetic foot evaluation.    New, Established, New Problem: Established    Onset: Insidious    Nature: IDDM    Stable, worsening, improving: Stable    Patient controlling diabetes via: Insulin pumps with CGM    Patient states their most recent blood glucose reading was 140.    Patient denies any fevers, chills, nausea, vomiting, shortness of breath, nor any other constitutional signs nor symptoms.    No other pedal complaints at this time.    Past Medical History:   Diagnosis Date   • Anxiety    • Cancer (HCC)    • Diabetes mellitus type I (HCC)    • GERD (gastroesophageal reflux disease)    • History of thyroplasty 07/2021   • Hyperlipidemia    • Hypertension    • Retinopathy    • Thyroid disease      Past Surgical History:   Procedure Laterality Date   • CARPAL TUNNEL RELEASE     • CHOLECYSTECTOMY     • COLONOSCOPY     • ENDOSCOPY N/A 10/28/2021    Procedure: ESOPHAGOGASTRODUODENOSCOPY with biopsies;  Surgeon: Nadia Sanz MD;  Location: [facility] ENDOSCOPY;  Service: Gastroenterology;  Laterality: N/A;  esophagitis and gastritis   • HYSTERECTOMY     • THYROID SURGERY     • UPPER GASTROINTESTINAL ENDOSCOPY       Family History   Problem Relation Age of Onset   • Diabetes type II Other    • Cancer Mother    • Cancer Father    • Cancer Sister    • Hypertension Brother    • Diabetes Brother    • Malig Hyperthermia Neg Hx      Social History     Socioeconomic History   • Marital status:    Tobacco Use   • Smoking status: Never Smoker   • Smokeless tobacco: Never Used    Vaping Use   • Vaping Use: Never used   Substance and Sexual Activity   • Alcohol use: Not Currently   • Drug use: Never   • Sexual activity: Defer     Allergies   Allergen Reactions   • Sulfa Antibiotics Hives   • Levofloxacin Rash     Current Outpatient Medications   Medication Sig Dispense Refill   • amLODIPine (NORVASC) 5 MG tablet TAKE 1 TABLET EVERY DAY 90 tablet 3   • aspirin (aspirin) 81 MG EC tablet Aspirin Low Dose 81 mg oral tablet,delayed release (DR/EC) take 1 tablet (81 mg) by oral route once daily   Active     • conjugated estrogens (Premarin) 0.625 MG/GM vaginal cream Insert  into the vagina 2 (Two) Times a Week. Pea sized amount 30 g 3   • escitalopram (LEXAPRO) 10 MG tablet TAKE 1 TABLET EVERY DAY 90 tablet 1   • hydroCHLOROthiazide (HYDRODIURIL) 25 MG tablet TAKE 1 TABLET EVERY DAY 90 tablet 3   • insulin aspart (novoLOG) 100 UNIT/ML injection      • levothyroxine (SYNTHROID, LEVOTHROID) 75 MCG tablet TAKE ONE TABLET BY MOUTH DAILY 90 tablet 1   • lisinopril (PRINIVIL,ZESTRIL) 40 MG tablet TAKE 1 TABLET EVERY DAY 90 tablet 3   • pantoprazole (PROTONIX) 20 MG EC tablet Take 1 tablet by mouth Daily. 30 tablet 1   • simvastatin (ZOCOR) 20 MG tablet TAKE 1 TABLET EVERY NIGHT 90 tablet 1   • Sodium Fluoride 5000 Sensitive 1.1-5 % gel Use twice daily       No current facility-administered medications for this visit.     Review of Systems   Constitutional: Negative.    All other systems reviewed and are negative.      OBJECTIVE     Vitals:    01/26/22 1000   BP: 123/59   Pulse: 61   Temp: 97.1 °F (36.2 °C)   SpO2: 100%       Body mass index is 25.42 kg/m².    Lab Results   Component Value Date    HGBA1C 6.9 12/10/2021       Lab Results   Component Value Date    GLUCOSE 111 (H) 01/10/2022    CALCIUM 9.6 01/10/2022     01/10/2022    K 4.4 01/10/2022    CO2 29.8 (H) 01/10/2022     01/10/2022    BUN 14 01/10/2022    CREATININE 0.73 01/10/2022    EGFRIFNONA 79 01/10/2022    BCR 19.2 01/10/2022     ANIONGAP 4.2 (L) 01/10/2022       Patient seen in no apparent distress.      PHYSICAL EXAM:     Foot/Ankle Exam:       General:   Diabetic Foot Exam Performed    Appearance: appears stated age and healthy    Orientation: AAOx3    Affect: appropriate    Gait: unimpaired    Shoe Gear:  Casual shoes    VASCULAR      Right Foot Vascularity   Normal vascular exam    Dorsalis pedis:  2+  Posterior tibial:  2+  Skin Temperature: warm    Edema Grading:  None  CFT:  < 3 seconds  Pedal Hair Growth:  Present  Varicosities: none       Left Foot Vascularity   Normal vascular exam    Dorsalis pedis:  2+  Posterior tibial:  2+  Skin Temperature: warm    Edema Grading:  None  CFT:  < 3 seconds  Pedal Hair Growth:  Present  Varicosities: none        NEUROLOGIC     Right Foot Neurologic   Normal sensation    Light touch sensation:  Normal  Vibratory sensation:  Normal  Hot/Cold sensation: normal    Protective Sensation using Kinde-Donya Monofilament:  10     Left Foot Neurologic   Normal sensation    Light touch sensation:  Normal  Vibratory sensation:  Normal  Hot/cold sensation: normal    Protective Sensation using Kinde-Donya Monofilament:  10     MUSCULOSKELETAL      Right Foot Musculoskeletal   Hallux valgus: Yes       Left Foot Musculoskeletal   Hallux valgus: Yes       MUSCLE STRENGTH     Right Foot Muscle Strength   Normal strength    Foot dorsiflexion:  5  Foot plantar flexion:  5  Foot inversion:  5  Foot eversion:  5     Left Foot Muscle Strength   Foot dorsiflexion:  5  Foot plantar flexion:  5  Foot inversion:  5  Foot eversion:  5     RANGE OF MOTION      Right Foot Range of Motion   Foot and ankle ROM within normal limits       Left Foot Range of Motion   Foot and ankle ROM within normal limits       DERMATOLOGIC     Right Foot Dermatologic   Skin: skin intact    Nails: normal       Left Foot Dermatologic   Skin: skin intact    Nails: normal        Diabetic Foot Exam Performed      ASSESSMENT/PLAN      Diagnoses and all orders for this visit:    1. Type 2 diabetes mellitus without complication, with long-term current use of insulin (HCC) (Primary)        Comprehensive lower extremity examination and evaluation was performed.    Discussed findings and treatment plan including risks, benefits, and treatment options with patient in detail. Patient agreed with treatment plan.    Medications and allergies reviewed.  Reviewed available blood glucose and HgB A1C lab values along with other pertinent labs.  These were discussed with the patient as to their importance of diabetic maintenance.    Diabetic foot exam performed and documented this date, compliant with CQM required standards. Detail of findings as noted in physical exam.  Lower extremity Neurologic exam for diabetic patient performed and documented this date, compliant with PQRS required standards. Detail of findings as noted in physical exam.  Advised patient importance of good routine lower extremity hygiene. Advised patient importance of evaluating for intact skin and pain free nail borders.  Advised patient to use mirror to evaluate plantar/ soles of feet for better visualization. Advised patient monitor and phone office to be seen if any cracking to skin, open lesions, painful nail borders or if nails become elongated prior to next visit. Advised patient importance of daily cleansing of lower extremities, followed by good skin cream to maintain normal hydration of skin. Also advised patient importance of close daily monitoring of blood sugar. Advised to regulate diet and medications to maintain control of blood sugar in optimal range. Contact primary care provider if difficulties maintaining blood sugar levels.  Advised Patient of presence of Diabetes Mellitus condition.  Advised Patient risk of progression and worsening or improvement, then return of condition.  Will monitor condition for any change in future. Treat with most appropriate treatment  pending status of condition.  Counseled and advised patient extensively on nature and ramifications of diabetes. Standard instructions given to patient for good diabetic foot care and maintenance. Advised importance of careful monitoring to avoid break down and complications secondary to diabetes. Advised patient importance of strict maintenance of blood sugar control. Advised patient of possible ominous results from neglect of condition, i.e.: amputation/ loss of digits, feet and legs, or even death.  Patient states understands counseling, will monitor closely, continue good hygiene and routine diabetic foot care. Patient will contact office is questions or problems.      An After Visit Summary was printed and given to the patient at discharge, including (if requested) any available informative/educational handouts regarding diagnosis, treatment, or medications. All questions were answered to patient/family satisfaction. Should symptoms fail to improve or worsen they agree to call or return to clinic or to go to the Emergency Department. Discussed the importance of following up with any needed screening tests/labs/specialist appointments and any requested follow-up recommended by me today. Importance of maintaining follow-up discussed and patient accepts that missed appointments can delay diagnosis and potentially lead to worsening of conditions.    Return in about 1 year (around 1/26/2023) for Podiatry Diabetic Foot Exam., or sooner if acute issues arise.    This document has been electronically signed by Tanner Green DPM on January 26, 2022 10:10 EST

## 2022-03-11 ENCOUNTER — OFFICE VISIT (OUTPATIENT)
Dept: DIABETES SERVICES | Facility: CLINIC | Age: 72
End: 2022-03-11

## 2022-03-11 VITALS
DIASTOLIC BLOOD PRESSURE: 57 MMHG | WEIGHT: 138 LBS | TEMPERATURE: 97.3 F | BODY MASS INDEX: 25.4 KG/M2 | OXYGEN SATURATION: 96 % | SYSTOLIC BLOOD PRESSURE: 102 MMHG | HEIGHT: 62 IN | RESPIRATION RATE: 16 BRPM | HEART RATE: 58 BPM

## 2022-03-11 DIAGNOSIS — E10.9 CONTROLLED DIABETES MELLITUS TYPE 1 WITHOUT COMPLICATIONS: Primary | ICD-10-CM

## 2022-03-11 LAB
EXPIRATION DATE: ABNORMAL
HBA1C MFR BLD: 6.9 %
Lab: ABNORMAL

## 2022-03-11 PROCEDURE — 95251 CONT GLUC MNTR ANALYSIS I&R: CPT | Performed by: NURSE PRACTITIONER

## 2022-03-11 PROCEDURE — 99214 OFFICE O/P EST MOD 30 MIN: CPT | Performed by: NURSE PRACTITIONER

## 2022-03-11 NOTE — PROGRESS NOTES
Chief Complaint  Diabetes (Blood sugars have been higher, also her blood sugars are all over the place.)    Referred By: No ref. provider found    Subjective          Yohana GARCIA Vinny presents to [facility] for insulin pump management    History of Present Illness    Visit type:  follow-up  Diabetes type:  Type 1  Current diabetes status/concerns/issues: She states she has been having problematic high glucose levels after eating  Other health concerns: She states she had Covid again in January; she has been diagnosed with osteopenia and osteoporosis.  She has been started on calcium with vitamin D3; she is scheduled to undergo injection for her vocal cord paralysis  Diabetes symptoms:    Polyuria: No   Polydipsia: No   Polyphagia: No   Blurred vision: No   Excessive fatigue: No  Diabetes complications:  Neuropathy:No  Nephropathy:No  Retinopathy:No  Amputation/Wounds:No  Gastroparesis:No  Cardiovascular Disease:Yes, Hypertension and hyperlipidemia  Erectile Dysfunction:N/A  Hypoglycemia:  Level 1 hypoglycemia (54 mg/dL - 70 mg/dL); Frequency - The CGM indicates 4% of glucose levels are below target  Hypoglycemia Symptoms:  shaking/tremors  Current diabetes treatment:  tandem insulin pump using aspart insulin approximately 30 units each day  Blood glucose device:  Dexcom CGM  Blood glucose monitoring frequency:  Continuous per CGM  Blood glucose range/average: The Dexcom sensor shows a 14-day glucose average of 145 mg/dL with 73% of glucose levels found in target range between 70 and 180 while 23% are above target 4% are below target.  She is having some episodes of hypoglycemia during the overnight hours if she consumes carbohydrates and puts her boluses and.  Otherwise throughout the daytime her glucose levels are high after her meals.  This is sometimes causing the pump to overcorrect and cause some episodes of hypoglycemia.  Diet:  Carbohydrate Counting, Avoids high carb/sweet foods,  Diet drinks only  Activity/Exercise:  She is doing some pedaling exercise    Past Medical History:   Diagnosis Date   • Anxiety    • Cancer (HCC)    • Diabetes mellitus type I (HCC)    • GERD (gastroesophageal reflux disease)    • History of thyroplasty 07/2021   • Hyperlipidemia    • Hypertension    • Retinopathy    • Thyroid disease      Past Surgical History:   Procedure Laterality Date   • CARPAL TUNNEL RELEASE     • CHOLECYSTECTOMY     • COLONOSCOPY     • ENDOSCOPY N/A 10/28/2021    Procedure: ESOPHAGOGASTRODUODENOSCOPY with biopsies;  Surgeon: Nadia Sanz MD;  Location: [facility] ENDOSCOPY;  Service: Gastroenterology;  Laterality: N/A;  esophagitis and gastritis   • HYSTERECTOMY     • THYROID SURGERY     • UPPER GASTROINTESTINAL ENDOSCOPY       Family History   Problem Relation Age of Onset   • Diabetes type II Other    • Cancer Mother    • Cancer Father    • Cancer Sister    • Hypertension Brother    • Diabetes Brother    • Malig Hyperthermia Neg Hx      Social History     Socioeconomic History   • Marital status:    Tobacco Use   • Smoking status: Never Smoker   • Smokeless tobacco: Never Used   Vaping Use   • Vaping Use: Never used   Substance and Sexual Activity   • Alcohol use: Not Currently   • Drug use: Never   • Sexual activity: Defer     Allergies   Allergen Reactions   • Sulfa Antibiotics Hives   • Levofloxacin Rash       Current Outpatient Medications:   •  amLODIPine (NORVASC) 5 MG tablet, TAKE 1 TABLET EVERY DAY, Disp: 90 tablet, Rfl: 3  •  aspirin 81 MG EC tablet, Aspirin Low Dose 81 mg oral tablet,delayed release (DR/EC) take 1 tablet (81 mg) by oral route once daily   Active, Disp: , Rfl:   •  conjugated estrogens (Premarin) 0.625 MG/GM vaginal cream, Insert  into the vagina 2 (Two) Times a Week. Pea sized amount, Disp: 30 g, Rfl: 3  •  escitalopram (LEXAPRO) 10 MG tablet, TAKE 1 TABLET EVERY DAY, Disp: 90 tablet, Rfl: 1  •  hydroCHLOROthiazide (HYDRODIURIL) 25 MG tablet, TAKE 1  TABLET EVERY DAY, Disp: 90 tablet, Rfl: 3  •  insulin aspart (novoLOG) 100 UNIT/ML injection, USE AS DIRECTED IN INSULIN PUMP **MAX 65 UNITS PER DAY, Disp: 20 mL, Rfl: 5  •  levothyroxine (SYNTHROID, LEVOTHROID) 75 MCG tablet, TAKE ONE TABLET BY MOUTH DAILY, Disp: 90 tablet, Rfl: 1  •  lisinopril (PRINIVIL,ZESTRIL) 40 MG tablet, TAKE 1 TABLET EVERY DAY, Disp: 90 tablet, Rfl: 3  •  pantoprazole (PROTONIX) 20 MG EC tablet, Take 1 tablet by mouth Daily., Disp: 30 tablet, Rfl: 1  •  simvastatin (ZOCOR) 20 MG tablet, TAKE 1 TABLET EVERY NIGHT, Disp: 90 tablet, Rfl: 1  •  Sodium Fluoride 5000 Sensitive 1.1-5 % gel, Use twice daily, Disp: , Rfl:     Review of Systems   Constitutional: Negative for activity change, appetite change, fatigue, fever, unexpected weight gain and unexpected weight loss.   HENT: Negative for congestion, ear pain, facial swelling, hearing loss, sore throat and tinnitus.    Eyes: Negative for blurred vision, double vision, redness and visual disturbance.   Respiratory: Negative for cough, shortness of breath and wheezing.    Cardiovascular: Negative for chest pain, palpitations and leg swelling.   Gastrointestinal: Negative for abdominal distention, constipation, diarrhea, nausea, vomiting, GERD and indigestion.   Endocrine: Negative for polydipsia, polyphagia and polyuria.   Genitourinary: Negative for difficulty urinating, frequency and urgency.   Musculoskeletal: Negative for back pain, gait problem and myalgias.   Skin: Negative for rash, skin lesions and wound.   Neurological: Negative for seizures, speech difficulty, weakness, headache and confusion.   Psychiatric/Behavioral: Negative for sleep disturbance, depressed mood and stress. The patient is not nervous/anxious.         Objective     Vitals:    03/11/22 1447   BP: 102/57   BP Location: Right arm   Patient Position: Sitting   Cuff Size: Adult   Pulse: 58   Resp: 16   Temp: 97.3 °F (36.3 °C)   SpO2: 96%   Weight: 62.6 kg (138 lb)  "  Height: 157.5 cm (62\")   PainSc: 0-No pain     Body mass index is 25.24 kg/m².    Physical Exam  Constitutional:       Appearance: Normal appearance.      Comments: Overweight with BMI of 25.24   HENT:      Head: Normocephalic and atraumatic.      Right Ear: External ear normal.      Left Ear: External ear normal.      Nose: Nose normal.   Eyes:      Extraocular Movements: Extraocular movements intact.      Conjunctiva/sclera: Conjunctivae normal.   Pulmonary:      Effort: Pulmonary effort is normal.   Musculoskeletal:         General: Normal range of motion.      Cervical back: Normal range of motion.   Skin:     General: Skin is warm and dry.   Neurological:      General: No focal deficit present.      Mental Status: She is alert and oriented to person, place, and time. Mental status is at baseline.   Psychiatric:         Mood and Affect: Mood normal.         Behavior: Behavior normal.         Thought Content: Thought content normal.         Judgment: Judgment normal.         Result Review :   The following data was reviewed by: JACKIE Qureshi on 03/11/2022:    Point-of-care A1c collected in the office today was 6.9% indicating controlled type 1 diabetes.  This is unchanged from the prior result of 6.9 collected in December 2021    Most Recent A1C    HGBA1C Most Recent 3/11/22   Hemoglobin A1C 6.9             A1C Last 3 Results    HGBA1C Last 3 Results 9/13/21 12/10/21 3/11/22   Hemoglobin A1C 6.85 (A) 6.9 6.9   (A) Abnormal value              Creatinine   Date Value Ref Range Status   01/10/2022 0.73 0.57 - 1.00 mg/dL Final   05/26/2021 0.67 0.50 - 0.90 mg/dL Final   12/22/2020 0.77 0.50 - 0.90 mg/dL Final       eGFR Non  Amer   Date Value Ref Range Status   01/10/2022 79 >60 mL/min/1.73 Final     Labs collected on 1/10/2022 show normal renal function          Assessment: Her A1c remains stable.  She is having some postprandial hyperglycemia as well as some episodes of hypoglycemia at random " times throughout the day.  Some of these episodes are occurring because the control IQ feature is over correcting for hyperglycemia after meals.      Diagnoses and all orders for this visit:    1. Controlled diabetes mellitus type 1 without complications (HCC) (Primary)  -     POC Glycosylated Hemoglobin (Hb A1C)        Plan: To program both high glucose levels and low glucose levels the carbohydrate ratios were changed as follows:    The 3 AM to 7 AM carbohydrate ratio of 1:12 was changed to 1-16  The 11 AM to 3 PM carbohydrate ratio of 1:20 was reduced to 1-16  The 3 PM to 5 PM carbohydrate ratio 1:22 was changed to 1-18  The 5 PM to 12 AM carbohydrate ratio 1:20 was changed to 1-16    The patient is to monitor closely for postprandial hypoglycemia after the changes have been made.  She will contact our office immediately if she experiences any of those so adjustments can be made to her pump settings.  Otherwise, the patient will be seen for routine follow-up appointment in 3 months.    The patient will monitor her blood glucose levels using her continuous glucose sensor.  If she develops problematic hyperglycemia or hypoglycemia or adverse drug reaction, she will contact the office for further instructions.        Follow Up     Return in about 3 months (around 6/11/2022) for Pump Eval, CGM Follow-up.    Patient was given instructions and counseling regarding her condition or for health maintenance advice. Please see specific information pulled into the AVS if appropriate.     Ame Key, APRN  03/11/2022

## 2022-04-05 ENCOUNTER — OFFICE VISIT (OUTPATIENT)
Dept: GASTROENTEROLOGY | Facility: CLINIC | Age: 72
End: 2022-04-05

## 2022-04-05 VITALS
BODY MASS INDEX: 24.66 KG/M2 | WEIGHT: 134 LBS | SYSTOLIC BLOOD PRESSURE: 139 MMHG | HEIGHT: 62 IN | DIASTOLIC BLOOD PRESSURE: 59 MMHG | HEART RATE: 49 BPM

## 2022-04-05 DIAGNOSIS — K44.9 HIATAL HERNIA: ICD-10-CM

## 2022-04-05 DIAGNOSIS — K20.90 ESOPHAGITIS: Primary | ICD-10-CM

## 2022-04-05 PROCEDURE — 99214 OFFICE O/P EST MOD 30 MIN: CPT | Performed by: NURSE PRACTITIONER

## 2022-04-05 RX ORDER — PANTOPRAZOLE SODIUM 40 MG/1
40 TABLET, DELAYED RELEASE ORAL DAILY
Qty: 90 TABLET | Refills: 1 | Status: SHIPPED | OUTPATIENT
Start: 2022-04-05 | End: 2022-07-04

## 2022-04-05 NOTE — PROGRESS NOTES
Chief Complaint  Follow-up (EGD)    Yohana Casillas is a 71 y.o. female who presents to [facility] for follow-up of epigastric pain and GERD.    History of present Illness    She reports improvement in epigastric pain since beginning Protonix 20 mg daily.  She continues to experience breakthrough reflux/heartburn 3 to 4 days/week.  Denies dysphagia.    Reports a regular bowel pattern.  Denies hematochezia and melena.      Past Medical History:   Diagnosis Date   • Anxiety    • Cancer (HCC)    • Diabetes mellitus type I (HCC)    • GERD (gastroesophageal reflux disease)    • History of thyroplasty 07/2021   • Hyperlipidemia    • Hypertension    • Retinopathy    • Thyroid disease        Past Surgical History:   Procedure Laterality Date   • CARPAL TUNNEL RELEASE     • CHOLECYSTECTOMY     • COLONOSCOPY     • ENDOSCOPY N/A 10/28/2021    Procedure: ESOPHAGOGASTRODUODENOSCOPY with biopsies;  Surgeon: Nadia Sanz MD;  Location: [facility] ENDOSCOPY;  Service: Gastroenterology;  Laterality: N/A;  esophagitis and gastritis   • HYSTERECTOMY     • THYROID SURGERY     • UPPER GASTROINTESTINAL ENDOSCOPY           Current Outpatient Medications:   •  amLODIPine (NORVASC) 5 MG tablet, TAKE 1 TABLET EVERY DAY, Disp: 90 tablet, Rfl: 3  •  aspirin 81 MG EC tablet, Aspirin Low Dose 81 mg oral tablet,delayed release (DR/EC) take 1 tablet (81 mg) by oral route once daily   Active, Disp: , Rfl:   •  conjugated estrogens (Premarin) 0.625 MG/GM vaginal cream, Insert  into the vagina 2 (Two) Times a Week. Pea sized amount, Disp: 30 g, Rfl: 3  •  escitalopram (LEXAPRO) 10 MG tablet, TAKE 1 TABLET EVERY DAY, Disp: 90 tablet, Rfl: 1  •  hydroCHLOROthiazide (HYDRODIURIL) 25 MG tablet, TAKE 1 TABLET EVERY DAY, Disp: 90 tablet, Rfl: 3  •  insulin aspart (novoLOG) 100 UNIT/ML injection, USE AS DIRECTED IN INSULIN PUMP **MAX 65 UNITS PER DAY, Disp: 20 mL, Rfl: 5  •  levothyroxine (SYNTHROID, LEVOTHROID) 75  "MCG tablet, TAKE ONE TABLET BY MOUTH DAILY, Disp: 90 tablet, Rfl: 1  •  lisinopril (PRINIVIL,ZESTRIL) 40 MG tablet, TAKE 1 TABLET EVERY DAY, Disp: 90 tablet, Rfl: 3  •  simvastatin (ZOCOR) 20 MG tablet, TAKE 1 TABLET EVERY NIGHT, Disp: 90 tablet, Rfl: 1  •  Sodium Fluoride 5000 Sensitive 1.1-5 % gel, Use twice daily, Disp: , Rfl:   •  pantoprazole (Protonix) 40 MG EC tablet, Take 1 tablet by mouth Daily for 90 days., Disp: 90 tablet, Rfl: 1     Allergies   Allergen Reactions   • Sulfa Antibiotics Hives   • Levofloxacin Rash       Family History   Problem Relation Age of Onset   • Diabetes type II Other    • Cancer Mother    • Cancer Father    • Cancer Sister    • Hypertension Brother    • Diabetes Brother    • Malig Hyperthermia Neg Hx         Social History     Social History Narrative   • Not on file       Objective       Vital Signs:   /59 (BP Location: Left arm, Patient Position: Sitting, Cuff Size: Adult)   Pulse (!) 49   Ht 157.5 cm (62\")   Wt 60.8 kg (134 lb)   BMI 24.51 kg/m²       Physical Exam  Constitutional:       General: She is not in acute distress.     Appearance: Normal appearance. She is well-developed and normal weight.   HENT:      Head: Normocephalic and atraumatic.   Eyes:      Conjunctiva/sclera: Conjunctivae normal.      Pupils: Pupils are equal, round, and reactive to light.      Visual Fields: Right eye visual fields normal and left eye visual fields normal.   Cardiovascular:      Rate and Rhythm: Normal rate and regular rhythm.      Heart sounds: Normal heart sounds.   Pulmonary:      Effort: Pulmonary effort is normal. No retractions.      Breath sounds: Normal breath sounds and air entry.   Abdominal:      General: Bowel sounds are normal.      Palpations: Abdomen is soft.      Tenderness: There is no abdominal tenderness.      Comments: No appreciable hepatosplenomegaly or ascites   Musculoskeletal:         General: Normal range of motion.      Cervical back: Neck supple.      " Right lower leg: No edema.      Left lower leg: No edema.   Lymphadenopathy:      Cervical: No cervical adenopathy.   Skin:     General: Skin is warm and dry.      Findings: No lesion.   Neurological:      General: No focal deficit present.      Mental Status: She is alert and oriented to person, place, and time.   Psychiatric:         Mood and Affect: Mood and affect normal.         Behavior: Behavior normal.         Result Review :     The following data was reviewed by: JACKIE Thomas on 04/05/2022:    CBC w/diff    CBC w/Diff 5/26/21   WBC 3.45 (A)   RBC 4.23   Hemoglobin 12.9   Hematocrit 38.8   MCV 91.7   MCH 30.5   MCHC 33.2   RDW 12.5   Platelets 258   Neutrophil Rel % 47.0   Lymphocyte Rel % 36.2   Monocyte Rel % 11.0 (A)   Eosinophil Rel % 4.6   Basophil Rel % 1.2   (A) Abnormal value            CMP    CMP 5/26/21 1/10/22   Glucose 73 111 (A)   BUN 15 14   Creatinine 0.67 0.73   eGFR Non African Am  79   Sodium 140 138   Potassium 4.2 4.4   Chloride 104 104   Calcium 9.1 9.6   Albumin 4.2 4.30   Total Bilirubin 0.24 0.4   Alkaline Phosphatase 76 75   AST (SGOT) 21 24   ALT (SGPT) 15 13   (A) Abnormal value            10/28/2021 EGD-grade a esophagitis, biopsy with mild changes suggestive of reflux esophagitis.  Negative for intestinal metaplasia.  Normal stomach, gastric antrum biopsy with mild chronic inactive gastritis.  Normal duodenum.                  Assessment and Plan    Diagnoses and all orders for this visit:    1. Esophagitis (Primary)  -     pantoprazole (Protonix) 40 MG EC tablet; Take 1 tablet by mouth Daily for 90 days.  Dispense: 90 tablet; Refill: 1    2. Hiatal hernia  -     pantoprazole (Protonix) 40 MG EC tablet; Take 1 tablet by mouth Daily for 90 days.  Dispense: 90 tablet; Refill: 1        Recommend increasing pantoprazole to 40 mg daily given breakthrough reflux is occurring often.      Follow Up   Return in about 6 months (around 10/5/2022) for GERD.  Patient was given  instructions and counseling regarding her condition or for health maintenance advice. Please see specific information pulled into the AVS if appropriate.

## 2022-04-05 NOTE — PATIENT INSTRUCTIONS
Food Choices for Gastroesophageal Reflux Disease, Adult  When you have gastroesophageal reflux disease (GERD), the foods you eat and your eating habits are very important. Choosing the right foods can help ease your discomfort. Think about working with a food expert (dietitian) to help you make good choices.  What are tips for following this plan?  Reading food labels  Look for foods that are low in saturated fat. Foods that may help with your symptoms include:  Foods that have less than 5% of daily value (DV) of fat.  Foods that have 0 grams of trans fat.  Cooking  Do not pettit your food.  Cook your food by baking, steaming, grilling, or broiling. These are all methods that do not need a lot of fat for cooking.  To add flavor, try to use herbs that are low in spice and acidity.  Meal planning    Choose healthy foods that are low in fat, such as:  Fruits and vegetables.  Whole grains.  Low-fat dairy products.  Lean meats, fish, and poultry.  Eat small meals often instead of eating 3 large meals each day. Eat your meals slowly in a place where you are relaxed. Avoid bending over or lying down until 2-3 hours after eating.  Limit high-fat foods such as fatty meats or fried foods.  Limit your intake of fatty foods, such as oils, butter, and shortening.  Avoid the following as told by your doctor:  Foods that cause symptoms. These may be different for different people. Keep a food diary to keep track of foods that cause symptoms.  Alcohol.  Drinking a lot of liquid with meals.  Eating meals during the 2-3 hours before bed.    Lifestyle  Stay at a healthy weight. Ask your doctor what weight is healthy for you. If you need to lose weight, work with your doctor to do so safely.  Exercise for at least 30 minutes on 5 or more days each week, or as told by your doctor.  Wear loose-fitting clothes.  Do not smoke or use any products that contain nicotine or tobacco. If you need help quitting, ask your doctor.  Sleep with the head  of your bed higher than your feet. Use a wedge under the mattress or blocks under the bed frame to raise the head of the bed.  Chew sugar-free gum after meals.  What foods should eat?    Eat a healthy, well-balanced diet of fruits, vegetables, whole grains, low-fat dairy products, lean meats, fish, and poultry. Each person is different.  Foods that may cause symptoms in one person may not cause any symptoms in another person. Work with your doctor to find foods that are safe for you.  The items listed above may not be a complete list of what you can eat and drink. Contact a food expert for more options.  What foods should I avoid?  Limiting some of these foods may help in managing the symptoms of GERD. Everyone is different. Talk with a food expert or your doctor to help you find the exact foods to avoid, if any.  Fruits  Any fruits prepared with added fat. Any fruits that cause symptoms. For some people, this may include citrus fruits, such as oranges, grapefruit, pineapple, and anant.  Vegetables  Deep-fried vegetables. French fries. Any vegetables prepared with added fat. Any vegetables that cause symptoms. For some people, this may include tomatoes and tomato products, chili peppers, onions and garlic, and horseradish.  Grains  Pastries or quick breads with added fat.  Meats and other proteins  High-fat meats, such as fatty beef or pork, hot dogs, ribs, ham, sausage, salami, and boyer. Fried meat or protein, including fried fish and fried chicken. Nuts and nut butters, in large amounts.  Dairy  Whole milk and chocolate milk. Sour cream. Cream. Ice cream. Cream cheese. Milkshakes.  Fats and oils  Butter. Margarine. Shortening. Ghee.  Beverages  Coffee and tea, with or without caffeine. Carbonated beverages. Sodas. Energy drinks. Fruit juice made with acidic fruits, such as orange or grapefruit. Tomato juice. Alcoholic drinks.  Sweets and desserts  Chocolate and cocoa. Donuts.  Seasonings and condiments  Pepper.  Peppermint and spearmint. Added salt. Any condiments, herbs, or seasonings that cause symptoms. For some people, this may include waldron, hot sauce, or vinegar-based salad dressings.  The items listed above may not be a complete list of what you should not eat and drink. Contact a food expert for more options.  Questions to ask your doctor  Diet and lifestyle changes are often the first steps that are taken to manage symptoms of GERD. If diet and lifestyle changes do not help, talk with your doctor about taking medicines.  Where to find more information  [employer] G[employer]intestinal [employer]: aboutgerd.org  Summary  When you have GERD, food and lifestyle choices are very important in easing your symptoms.  Eat small meals often instead of 3 large meals a day. Eat your meals slowly and in a place where you are relaxed.  Avoid bending over or lying down until 2-3 hours after eating.  Limit high-fat foods such as fatty meats or fried foods.  This information is not intended to replace advice given to you by your health care provider. Make sure you discuss any questions you have with your health care provider.  Document Revised: 06/28/2021 Document Reviewed: 06/28/2021  Elsevier Patient Education © 2021 Else[employer] Inc[employer]

## 2022-04-18 DIAGNOSIS — F41.9 ANXIETY: ICD-10-CM

## 2022-04-18 DIAGNOSIS — E78.2 MIXED HYPERLIPIDEMIA: ICD-10-CM

## 2022-04-18 RX ORDER — ESCITALOPRAM OXALATE 10 MG/1
TABLET ORAL
Qty: 90 TABLET | Refills: 1 | Status: SHIPPED | OUTPATIENT
Start: 2022-04-18 | End: 2022-09-14

## 2022-04-18 RX ORDER — SIMVASTATIN 20 MG
TABLET ORAL
Qty: 90 TABLET | Refills: 1 | Status: SHIPPED | OUTPATIENT
Start: 2022-04-18 | End: 2022-09-14

## 2022-06-24 ENCOUNTER — OFFICE VISIT (OUTPATIENT)
Dept: DIABETES SERVICES | Facility: CLINIC | Age: 72
End: 2022-06-24

## 2022-06-24 VITALS
WEIGHT: 138 LBS | TEMPERATURE: 98.2 F | BODY MASS INDEX: 25.4 KG/M2 | HEART RATE: 75 BPM | HEIGHT: 62 IN | OXYGEN SATURATION: 98 % | SYSTOLIC BLOOD PRESSURE: 100 MMHG | DIASTOLIC BLOOD PRESSURE: 54 MMHG

## 2022-06-24 DIAGNOSIS — E10.9 CONTROLLED DIABETES MELLITUS TYPE 1 WITHOUT COMPLICATIONS: Primary | ICD-10-CM

## 2022-06-24 LAB
EXPIRATION DATE: ABNORMAL
HBA1C MFR BLD: 6.9 %
Lab: ABNORMAL

## 2022-06-24 PROCEDURE — 99214 OFFICE O/P EST MOD 30 MIN: CPT | Performed by: NURSE PRACTITIONER

## 2022-06-24 PROCEDURE — 95251 CONT GLUC MNTR ANALYSIS I&R: CPT | Performed by: NURSE PRACTITIONER

## 2022-06-24 NOTE — PROGRESS NOTES
Chief Complaint  Diabetes (F/u,med refills, pump eval)    Referred By: No ref. provider found    Subjective          Yohana Casillas presents to [facility] for insulin pump management    History of Present Illness    Visit type:  follow-up  Diabetes type:  Type 1  Current diabetes status/concerns/issues: She has been having some episodes of hypoglycemia mostly while she is at work due to increased physical activity  Other health concerns: She had an injection to her vocal cords in April of this year as ongoing treatment for her vocal cord paralysis  Diabetes symptoms:    Polyuria: No   Polydipsia: No   Polyphagia: No   Blurred vision: No   Excessive fatigue: No  Diabetes complications:  Neuro: None  Renal: None  Eyes: None  Amputation/Wounds: None  GI: None  Cardiovascular: Hypertension and hyperlipidemia  ED: N/A  Other: None  Hypoglycemia:  Level 1 hypoglycemia (54 mg/dL - 70 mg/dL); Frequency - 6% of glucose levels are below target per CGM report and Level 2 (less than 54 mg/dL); Frequency - The CGM records glucose levels in the 40s  Hypoglycemia Symptoms:  weakness and mood/behavior change  Current diabetes treatment:  tandem insulin pump using aspart insulin approximately 30 units each day  Blood glucose device:  Dexcom CGM  Blood glucose monitoring frequency:  Continuous per CGM  Blood glucose range/average: The 14-day pump report shows a glucose average of 155 mg/dL with a high of 400 and low 40.  In the high glucose levels of 404 because of a bad infusion set.  66% of glucose levels are in target range between 70 and 180 while 28% are above target and 6% are below target.  She is having postprandial hypoglycemia.  Diet:  Avoids high carb/sweet foods, Avoids sugary drinks  Activity/Exercise:  She is physically active with work    Past Medical History:   Diagnosis Date   • Anxiety    • Cancer (HCC)    • Diabetes mellitus type I (HCC)    • GERD (gastroesophageal reflux disease)     • History of thyroplasty 07/2021   • Hyperlipidemia    • Hypertension    • Retinopathy    • Thyroid disease      Past Surgical History:   Procedure Laterality Date   • CARPAL TUNNEL RELEASE     • CHOLECYSTECTOMY     • COLONOSCOPY     • ENDOSCOPY N/A 10/28/2021    Procedure: ESOPHAGOGASTRODUODENOSCOPY with biopsies;  Surgeon: Nadia Sanz MD;  Location: [facility] ENDOSCOPY;  Service: Gastroenterology;  Laterality: N/A;  esophagitis and gastritis   • HYSTERECTOMY     • THYROID SURGERY     • UPPER GASTROINTESTINAL ENDOSCOPY       Family History   Problem Relation Age of Onset   • Diabetes type II Other    • Cancer Mother    • Cancer Father    • Cancer Sister    • Hypertension Brother    • Diabetes Brother    • Malig Hyperthermia Neg Hx      Social History     Socioeconomic History   • Marital status:    • Number of children: 2   Tobacco Use   • Smoking status: Never Smoker   • Smokeless tobacco: Never Used   Vaping Use   • Vaping Use: Never used   Substance and Sexual Activity   • Alcohol use: Not Currently   • Drug use: Never   • Sexual activity: Defer     Allergies   Allergen Reactions   • Sulfa Antibiotics Hives   • Levofloxacin Rash       Current Outpatient Medications:   •  amLODIPine (NORVASC) 5 MG tablet, TAKE 1 TABLET EVERY DAY, Disp: 90 tablet, Rfl: 3  •  aspirin 81 MG EC tablet, Aspirin Low Dose 81 mg oral tablet,delayed release (DR/EC) take 1 tablet (81 mg) by oral route once daily   Active, Disp: , Rfl:   •  conjugated estrogens (Premarin) 0.625 MG/GM vaginal cream, Insert  into the vagina 2 (Two) Times a Week. Pea sized amount, Disp: 30 g, Rfl: 3  •  escitalopram (LEXAPRO) 10 MG tablet, TAKE 1 TABLET EVERY DAY, Disp: 90 tablet, Rfl: 1  •  hydroCHLOROthiazide (HYDRODIURIL) 25 MG tablet, TAKE 1 TABLET EVERY DAY, Disp: 90 tablet, Rfl: 3  •  insulin aspart (novoLOG) 100 UNIT/ML injection, USE AS DIRECTED IN INSULIN PUMP **MAX 65 UNITS PER DAY, Disp: 20 mL, Rfl: 5  •  levothyroxine (SYNTHROID,  "LEVOTHROID) 75 MCG tablet, TAKE ONE TABLET BY MOUTH DAILY, Disp: 90 tablet, Rfl: 1  •  lisinopril (PRINIVIL,ZESTRIL) 40 MG tablet, TAKE 1 TABLET EVERY DAY, Disp: 90 tablet, Rfl: 3  •  pantoprazole (Protonix) 40 MG EC tablet, Take 1 tablet by mouth Daily for 90 days. (Patient taking differently: Take 40 mg by mouth Daily. Take two 40mg tabes once daily), Disp: 90 tablet, Rfl: 1  •  simvastatin (ZOCOR) 20 MG tablet, TAKE 1 TABLET EVERY NIGHT, Disp: 90 tablet, Rfl: 1  •  Sodium Fluoride 5000 Sensitive 1.1-5 % gel, Use twice daily, Disp: , Rfl:     Review of Systems   Constitutional: Negative for activity change, appetite change, fatigue, fever, unexpected weight gain and unexpected weight loss.   HENT: Negative for congestion, ear pain, facial swelling, hearing loss, sore throat and tinnitus.    Eyes: Negative for blurred vision, double vision, redness and visual disturbance.   Respiratory: Negative for cough, shortness of breath and wheezing.    Cardiovascular: Negative for chest pain, palpitations and leg swelling.   Gastrointestinal: Positive for GERD and indigestion. Negative for abdominal distention, constipation, diarrhea, nausea and vomiting.   Endocrine: Negative for polydipsia, polyphagia and polyuria.   Genitourinary: Negative for difficulty urinating, frequency and urgency.   Musculoskeletal: Negative for back pain, gait problem and myalgias.   Skin: Negative for rash, skin lesions and wound.   Neurological: Negative for seizures, speech difficulty, weakness, headache and confusion.   Psychiatric/Behavioral: Negative for sleep disturbance, depressed mood and stress. The patient is not nervous/anxious.         Objective     Vitals:    06/24/22 1406   BP: 100/54   BP Location: Right arm   Patient Position: Sitting   Cuff Size: Adult   Pulse: 75   Temp: 98.2 °F (36.8 °C)   SpO2: 98%   Weight: 62.6 kg (138 lb)   Height: 157.5 cm (62\")   PainSc: 0-No pain     Body mass index is 25.24 kg/m².    Physical " Exam  Constitutional:       Appearance: Normal appearance.      Comments: Overweight with BMI of 25.24   HENT:      Head: Normocephalic and atraumatic.      Right Ear: External ear normal.      Left Ear: External ear normal.      Nose: Nose normal.   Eyes:      Extraocular Movements: Extraocular movements intact.      Conjunctiva/sclera: Conjunctivae normal.   Pulmonary:      Effort: Pulmonary effort is normal.   Musculoskeletal:         General: Normal range of motion.      Cervical back: Normal range of motion.   Skin:     General: Skin is warm and dry.   Neurological:      General: No focal deficit present.      Mental Status: She is alert and oriented to person, place, and time. Mental status is at baseline.   Psychiatric:         Mood and Affect: Mood normal.         Behavior: Behavior normal.         Thought Content: Thought content normal.         Judgment: Judgment normal.         Result Review :   The following data was reviewed by: JACKIE Qureshi on 06/24/2022:    Point-of-care A1c collected in the office today was 6.9% indicating controlled type 1 diabetes.  This is unchanged from the prior result collected in March of this year.    Most Recent A1C    HGBA1C Most Recent 6/24/22   Hemoglobin A1C 6.9             A1C Last 3 Results    HGBA1C Last 3 Results 12/10/21 3/11/22 6/24/22   Hemoglobin A1C 6.9 6.9 6.9                   Assessment: Her A1c remains well controlled.  She is having some problematic hypoglycemia after taking boluses for carbohydrates.  She also states some of the hypoglycemia occurs after increased physical activity at work.      Diagnoses and all orders for this visit:    1. Controlled diabetes mellitus type 1 without complications (HCC) (Primary)  -     POC Glycosylated Hemoglobin (Hb A1C)        Plan: Because of the postprandial hyperglycemia the carbohydrate ratios were changed as follows:    3 AM carbohydrate ratio of 1:16 was changed to 1-18  7 AM carbohydrate ratio of  1:12 was changed to 10-14  11 AM carbohydrate ratio of 1:16 was changed to 1-18  3 PM carbohydrate ratio of 1:18 was changed to 1-20  5 PM carbohydrate ratio of 1:16 is changed to 1-18    The patient will call the office if this does not resolve the hypoglycemia.  Otherwise she will be scheduled for routine follow-up appointment    The patient will monitor her blood glucose levels using the continuous glucose sensor.  If she develops problematic hyperglycemia or hypoglycemia or adverse drug reactions, she will contact the office for further instructions.        Follow Up     No follow-ups on file.    Patient was given instructions and counseling regarding her condition or for health maintenance advice. Please see specific information pulled into the AVS if appropriate.     Ame Key, APRN  06/24/2022

## 2022-08-22 ENCOUNTER — OFFICE VISIT (OUTPATIENT)
Dept: FAMILY MEDICINE CLINIC | Age: 72
End: 2022-08-22

## 2022-08-22 VITALS
DIASTOLIC BLOOD PRESSURE: 60 MMHG | OXYGEN SATURATION: 99 % | HEART RATE: 61 BPM | WEIGHT: 133.4 LBS | TEMPERATURE: 98 F | BODY MASS INDEX: 24.55 KG/M2 | SYSTOLIC BLOOD PRESSURE: 137 MMHG | HEIGHT: 62 IN

## 2022-08-22 DIAGNOSIS — J06.9 VIRAL UPPER RESPIRATORY TRACT INFECTION: ICD-10-CM

## 2022-08-22 DIAGNOSIS — R05.9 COUGH: Primary | ICD-10-CM

## 2022-08-22 LAB
EXPIRATION DATE: NORMAL
EXPIRATION DATE: NORMAL
FLUAV AG NPH QL: NEGATIVE
FLUBV AG NPH QL: NEGATIVE
INTERNAL CONTROL: NORMAL
INTERNAL CONTROL: NORMAL
Lab: NORMAL
Lab: NORMAL
S PYO AG THROAT QL: NEGATIVE

## 2022-08-22 PROCEDURE — 87880 STREP A ASSAY W/OPTIC: CPT | Performed by: NURSE PRACTITIONER

## 2022-08-22 PROCEDURE — 87804 INFLUENZA ASSAY W/OPTIC: CPT | Performed by: NURSE PRACTITIONER

## 2022-08-22 PROCEDURE — 99213 OFFICE O/P EST LOW 20 MIN: CPT | Performed by: NURSE PRACTITIONER

## 2022-08-22 PROCEDURE — 87081 CULTURE SCREEN ONLY: CPT | Performed by: NURSE PRACTITIONER

## 2022-08-22 RX ORDER — BROMPHENIRAMINE MALEATE, PSEUDOEPHEDRINE HYDROCHLORIDE, AND DEXTROMETHORPHAN HYDROBROMIDE 2; 30; 10 MG/5ML; MG/5ML; MG/5ML
5 SYRUP ORAL 4 TIMES DAILY PRN
Qty: 473 ML | Refills: 0 | Status: SHIPPED | OUTPATIENT
Start: 2022-08-22 | End: 2022-09-20

## 2022-08-22 RX ORDER — PANTOPRAZOLE SODIUM 40 MG/1
TABLET, DELAYED RELEASE ORAL
COMMUNITY
Start: 2022-07-03 | End: 2022-10-05 | Stop reason: SDUPTHER

## 2022-08-22 NOTE — PROGRESS NOTES
"Chief Complaint  Sore Throat (Pt c/o cough, sore throat. At home covid test was negative. /Ongoing since 8/18/22./)    Subjective          Yohana Casillas presents to [facility]  Symptoms since Thursday.  was ill last week.     Sore Throat   This is a new problem. The current episode started in the past 7 days. The problem has been gradually worsening. There has been no fever. Associated symptoms include congestion, coughing (productive), headaches and a plugged ear sensation. Pertinent negatives include no diarrhea, ear discharge, ear pain, hoarse voice, shortness of breath, swollen glands or vomiting. She has had no exposure to strep. She has tried acetaminophen (Mucinex) for the symptoms. The treatment provided no relief.       Objective   Vital Signs:   /60 (BP Location: Left arm, Patient Position: Sitting)   Pulse 61   Temp 98 °F (36.7 °C) (Oral)   Ht 157.5 cm (62\")   Wt 60.5 kg (133 lb 6.4 oz)   SpO2 99% Comment: room air  BMI 24.40 kg/m²     Physical Exam  Constitutional:       Appearance: Normal appearance. She is normal weight.   HENT:      Head: Normocephalic.      Right Ear: Tympanic membrane, ear canal and external ear normal.      Left Ear: Tympanic membrane, ear canal and external ear normal.      Nose:      Right Sinus: Maxillary sinus tenderness and frontal sinus tenderness present.      Left Sinus: Maxillary sinus tenderness and frontal sinus tenderness present.      Mouth/Throat:      Mouth: Mucous membranes are moist.      Pharynx: Oropharynx is clear. No oropharyngeal exudate or posterior oropharyngeal erythema.   Eyes:      Conjunctiva/sclera: Conjunctivae normal.      Pupils: Pupils are equal, round, and reactive to light.   Cardiovascular:      Rate and Rhythm: Normal rate and regular rhythm.      Pulses: Normal pulses.      Heart sounds: Normal heart sounds.   Pulmonary:      Effort: Pulmonary effort is normal.      Breath sounds: Normal " breath sounds.   Musculoskeletal:      Cervical back: Normal range of motion.   Lymphadenopathy:      Cervical: No cervical adenopathy.   Neurological:      Mental Status: She is alert and oriented to person, place, and time.   Psychiatric:         Mood and Affect: Mood normal.         Behavior: Behavior normal.         Thought Content: Thought content normal.        Result Review :   The following data was reviewed by: JACKIE Evans on 08/22/2022:                  Assessment and Plan    Diagnoses and all orders for this visit:    1. Cough (Primary)  -     POCT Influenza A/B  -     POCT rapid strep A  -     Beta Strep Culture, Throat - , Throat; Future  -     Beta Strep Culture, Throat - Swab, Throat    2. Viral upper respiratory tract infection  -     brompheniramine-pseudoephedrine-DM 30-2-10 MG/5ML syrup; Take 5 mL by mouth 4 (Four) Times a Day As Needed for Congestion or Cough.  Dispense: 473 mL; Refill: 0    Her symptoms are most likely viral in nature.  We will send in Bromfed-DM to see if that will help with her symptoms.  Her rapid strep and flu were negative.      Follow Up   Return if symptoms worsen or fail to improve.  Patient was given instructions and counseling regarding her condition or for health maintenance advice. Please see specific information pulled into the AVS if appropriate.

## 2022-08-24 LAB — BACTERIA SPEC AEROBE CULT: NORMAL

## 2022-08-29 RX ORDER — LEVOTHYROXINE SODIUM 0.07 MG/1
TABLET ORAL
Qty: 90 TABLET | Refills: 1 | Status: SHIPPED | OUTPATIENT
Start: 2022-08-29 | End: 2023-02-21

## 2022-09-13 DIAGNOSIS — F41.9 ANXIETY: ICD-10-CM

## 2022-09-13 DIAGNOSIS — E78.2 MIXED HYPERLIPIDEMIA: ICD-10-CM

## 2022-09-14 RX ORDER — LISINOPRIL 40 MG/1
TABLET ORAL
Qty: 90 TABLET | Refills: 1 | Status: SHIPPED | OUTPATIENT
Start: 2022-09-14

## 2022-09-14 RX ORDER — ESCITALOPRAM OXALATE 10 MG/1
TABLET ORAL
Qty: 90 TABLET | Refills: 1 | Status: SHIPPED | OUTPATIENT
Start: 2022-09-14

## 2022-09-14 RX ORDER — SIMVASTATIN 20 MG
TABLET ORAL
Qty: 90 TABLET | Refills: 1 | Status: SHIPPED | OUTPATIENT
Start: 2022-09-14

## 2022-09-14 RX ORDER — HYDROCHLOROTHIAZIDE 25 MG/1
TABLET ORAL
Qty: 90 TABLET | Refills: 1 | Status: SHIPPED | OUTPATIENT
Start: 2022-09-14

## 2022-09-14 RX ORDER — AMLODIPINE BESYLATE 5 MG/1
TABLET ORAL
Qty: 90 TABLET | Refills: 1 | Status: SHIPPED | OUTPATIENT
Start: 2022-09-14

## 2022-09-20 ENCOUNTER — OFFICE VISIT (OUTPATIENT)
Dept: FAMILY MEDICINE CLINIC | Age: 72
End: 2022-09-20

## 2022-09-20 VITALS
HEART RATE: 51 BPM | HEIGHT: 62 IN | RESPIRATION RATE: 16 BRPM | BODY MASS INDEX: 24.4 KG/M2 | TEMPERATURE: 98.4 F | DIASTOLIC BLOOD PRESSURE: 54 MMHG | WEIGHT: 132.6 LBS | SYSTOLIC BLOOD PRESSURE: 110 MMHG

## 2022-09-20 DIAGNOSIS — R30.0 DYSURIA: Primary | ICD-10-CM

## 2022-09-20 LAB
BILIRUB UR QL STRIP: NEGATIVE
CLARITY UR: CLEAR
COLOR UR: YELLOW
GLUCOSE UR STRIP-MCNC: NEGATIVE MG/DL
HGB UR QL STRIP.AUTO: NEGATIVE
KETONES UR QL STRIP: NEGATIVE
LEUKOCYTE ESTERASE UR QL STRIP.AUTO: NEGATIVE
NITRITE UR QL STRIP: NEGATIVE
PH UR STRIP.AUTO: 6 [PH] (ref 5–8)
PROT UR QL STRIP: NEGATIVE
SP GR UR STRIP: >=1.03 (ref 1–1.03)
UROBILINOGEN UR QL STRIP: NORMAL

## 2022-09-20 PROCEDURE — 99213 OFFICE O/P EST LOW 20 MIN: CPT | Performed by: NURSE PRACTITIONER

## 2022-09-20 PROCEDURE — 81003 URINALYSIS AUTO W/O SCOPE: CPT | Performed by: NURSE PRACTITIONER

## 2022-09-20 RX ORDER — FLUCONAZOLE 150 MG/1
150 TABLET ORAL ONCE
Qty: 1 TABLET | Refills: 0 | Status: SHIPPED | OUTPATIENT
Start: 2022-09-20 | End: 2022-09-21

## 2022-09-20 NOTE — PROGRESS NOTES
Yohana Casillas presents to [facility].    Chief Complaint:  Urinary Tract Infection (Pt pelvic pain, burning with urination. /Went to urgent care [facility] [place], unsure what day, and was given an antibiotic (Macrobid) started having side effects, extremely nauseated, constipation, severe headache and stopped taking medication. Reports still having UTI symptoms, pressure, frequency and burning sensation. )         History of Present Illness:  UTI:  Symptoms started: over a week ago   Associated symptoms: pressure, frequncy and dysuria   Treatments tried: macrobid and has been on other ATB's (wondered if she has a yeast infection)     PAST MEDICAL HISTORY changes since 1-2022:    Got her moderna covid booster at work       Hyperlipidemia: Hypercholesterolemia;     Hypertension     Type 1 Diabetes: controlled;     retinopathy, now being monitored, per Dr. Johnny MCKEON +      Hospitalizations: uti and drug reaction , at [facility]         CURRENT MEDICAL PROVIDERS:    Cardiologist: Reese    Urologist: dr sara Key NP/CDE         PREVENTIVE HEALTH MAINTENANCE             BONE DENSITY: was last done 2010 osteopenia     COLORECTAL CANCER SCREENING: colonoscopy with normal results     Hepatitis C Medicare Screening: was last done      MAMMOGRAM: was last done 9/2016 with the following abnormalaties noted-- required more images on left breast     PAP SMEAR: hysterectomy         Surgical History:       EGD     Hysterectomy: Partial;     thyroidplasty 12-22-11     Thyroidectomy: small portion remains;     Bilateral Tubal Ligation    Bilateral Carpal Tunnel;    right index finger, trigger release and right wrist cyst removed 12-21-11; Procedures: colonoscopy 2004     Cataract Removal: bilateral; 9&10- 2016;     Cholecystectomy: laparoscopic; 1-4-19;     Procedures:    Colonoscopy ( 12-18-14 )    EGD ( 12-18-14 ) left vocal cord surgery 9-26-19          Family History:         Positive for Hypertension ( brother ).      Positive for Lung Cancer ( father; mother ).      Positive for Seizure Disorder ( brother ).  Father:  at age 63; Cause of death was lung cancer     Mother:  at age 69; Cause of death was adrenal cancer;  Lung Cancer         Social History:     Occupation: [employer] de[employer] 3-4 days a week     Marital Status:      Children: 2 children           Review of Systems:  Review of Systems   Constitutional: Negative for fever.   Genitourinary: Negative for hematuria and vaginal discharge.   Musculoskeletal: Negative for back pain.          Current Outpatient Medications:   •  amLODIPine (NORVASC) 5 MG tablet, TAKE 1 TABLET EVERY DAY, Disp: 90 tablet, Rfl: 1  •  aspirin 81 MG EC tablet, Aspirin Low Dose 81 mg oral tablet,delayed release (DR/EC) take 1 tablet (81 mg) by oral route once daily   Active, Disp: , Rfl:   •  escitalopram (LEXAPRO) 10 MG tablet, TAKE 1 TABLET EVERY DAY, Disp: 90 tablet, Rfl: 1  •  hydroCHLOROthiazide (HYDRODIURIL) 25 MG tablet, TAKE 1 TABLET EVERY DAY, Disp: 90 tablet, Rfl: 1  •  insulin aspart (novoLOG) 100 UNIT/ML injection, USE AS DIRECTED IN INSULIN PUMP **MAX 65 UNITS PER DAY, Disp: 20 mL, Rfl: 5  •  levothyroxine (SYNTHROID, LEVOTHROID) 75 MCG tablet, TAKE ONE TABLET BY MOUTH DAILY, Disp: 90 tablet, Rfl: 1  •  lisinopril (PRINIVIL,ZESTRIL) 40 MG tablet, TAKE 1 TABLET EVERY DAY, Disp: 90 tablet, Rfl: 1  •  pantoprazole (PROTONIX) 40 MG EC tablet, , Disp: , Rfl:   •  simvastatin (ZOCOR) 20 MG tablet, TAKE 1 TABLET EVERY NIGHT, Disp: 90 tablet, Rfl: 1  •  Sodium Fluoride 5000 Sensitive 1.1-5 % gel, Use twice daily, Disp: , Rfl:   •  fluconazole (Diflucan) 150 MG tablet, Take 1 tablet by mouth 1 (One) Time for 1 dose., Disp: 1 tablet, Rfl: 0    Vital Signs:   Vitals:    22 1345   BP: 110/54   BP Location: Right arm   Patient Position: Sitting   Cuff Size: Adult   Pulse: 51   Resp: 16   Temp: 98.4  "°F (36.9 °C)   TempSrc: Oral   Weight: 60.1 kg (132 lb 9.6 oz)   Height: 157.5 cm (62\")   PainSc:   3   PainLoc: Groin         Physical Exam:  Physical Exam  Constitutional:       Appearance: Normal appearance.   Cardiovascular:      Rate and Rhythm: Normal rate and regular rhythm.      Heart sounds: No murmur heard.  Pulmonary:      Effort: Pulmonary effort is normal.      Breath sounds: Normal breath sounds.   Abdominal:      Tenderness: There is no right CVA tenderness or left CVA tenderness.   Neurological:      Mental Status: She is alert.   Psychiatric:         Mood and Affect: Mood normal.         Behavior: Behavior normal.         Result Review      The following data was reviewed by: JACKIE Rosen on 09/20/2022:    Results for orders placed or performed in visit on 09/20/22   Urinalysis With Culture If Indicated - Urine, Clean Catch    Specimen: Urine, Clean Catch   Result Value Ref Range    Color, UA Yellow Yellow, Straw    Appearance, UA Clear Clear    pH, UA 6.0 5.0 - 8.0    Specific Gravity, UA >=1.030 1.005 - 1.030    Glucose, UA Negative Negative    Ketones, UA Negative Negative    Bilirubin, UA Negative Negative    Blood, UA Negative Negative    Protein, UA Negative Negative    Leuk Esterase, UA Negative Negative    Nitrite, UA Negative Negative    Urobilinogen, UA 0.2 E.U./dL 0.2 - 1.0 E.U./dL               Assessment and Plan:          Diagnoses and all orders for this visit:    1. Dysuria (Primary)  Assessment & Plan:  Send for urine culture from [facility] / urine clear today, will cover her with Diflucan, if not improving, let me know, she had tried to get in with her urologist, consider urology consult if needed     Orders:  -     Urinalysis With Culture If Indicated - Urine, Clean Catch  -     fluconazole (Diflucan) 150 MG tablet; Take 1 tablet by mouth 1 (One) Time for 1 dose.  Dispense: 1 tablet; Refill: 0        Follow Up   Return if symptoms worsen or fail to improve.  Patient was " given instructions and counseling regarding her condition or for health maintenance advice. Please see specific information pulled into the AVS if appropriate.

## 2022-09-20 NOTE — ASSESSMENT & PLAN NOTE
Send for urine culture from [facility] / urine clear today, will cover her with Diflucan, if not improving, let me know, she had tried to get in with her urologist, consider urology consult if needed

## 2022-10-05 ENCOUNTER — OFFICE VISIT (OUTPATIENT)
Dept: GASTROENTEROLOGY | Facility: CLINIC | Age: 72
End: 2022-10-05

## 2022-10-05 VITALS
HEART RATE: 59 BPM | WEIGHT: 130.6 LBS | BODY MASS INDEX: 24.03 KG/M2 | HEIGHT: 62 IN | SYSTOLIC BLOOD PRESSURE: 123 MMHG | DIASTOLIC BLOOD PRESSURE: 61 MMHG

## 2022-10-05 DIAGNOSIS — K44.9 HIATAL HERNIA: ICD-10-CM

## 2022-10-05 DIAGNOSIS — K20.90 ESOPHAGITIS: Primary | ICD-10-CM

## 2022-10-05 PROCEDURE — 99213 OFFICE O/P EST LOW 20 MIN: CPT | Performed by: NURSE PRACTITIONER

## 2022-10-05 RX ORDER — PANTOPRAZOLE SODIUM 40 MG/1
40 TABLET, DELAYED RELEASE ORAL DAILY
Qty: 90 TABLET | Refills: 3 | Status: SHIPPED | OUTPATIENT
Start: 2022-10-05

## 2022-10-05 NOTE — PATIENT INSTRUCTIONS
Food Choices for Gastroesophageal Reflux Disease, Adult  When you have gastroesophageal reflux disease (GERD), the foods you eat and your eating habits are very important. Choosing the right foods can help ease your discomfort. Think about working with a food expert (dietitian) to help you make good choices.  What are tips for following this plan?  Reading food labels  Look for foods that are low in saturated fat. Foods that may help with your symptoms include:  Foods that have less than 5% of daily value (DV) of fat.  Foods that have 0 grams of trans fat.  Cooking  Do not pettit your food.  Cook your food by baking, steaming, grilling, or broiling. These are all methods that do not need a lot of fat for cooking.  To add flavor, try to use herbs that are low in spice and acidity.  Meal planning    Choose healthy foods that are low in fat, such as:  Fruits and vegetables.  Whole grains.  Low-fat dairy products.  Lean meats, fish, and poultry.  Eat small meals often instead of eating 3 large meals each day. Eat your meals slowly in a place where you are relaxed. Avoid bending over or lying down until 2-3 hours after eating.  Limit high-fat foods such as fatty meats or fried foods.  Limit your intake of fatty foods, such as oils, butter, and shortening.  Avoid the following as told by your doctor:  Foods that cause symptoms. These may be different for different people. Keep a food diary to keep track of foods that cause symptoms.  Alcohol.  Drinking a lot of liquid with meals.  Eating meals during the 2-3 hours before bed.  Lifestyle  Stay at a healthy weight. Ask your doctor what weight is healthy for you. If you need to lose weight, work with your doctor to do so safely.  Exercise for at least 30 minutes on 5 or more days each week, or as told by your doctor.  Wear loose-fitting clothes.  Do not smoke or use any products that contain nicotine or tobacco. If you need help quitting, ask your doctor.  Sleep with the head  of your bed higher than your feet. Use a wedge under the mattress or blocks under the bed frame to raise the head of the bed.  Chew sugar-free gum after meals.  What foods should eat?  Eat a healthy, well-balanced diet of fruits, vegetables, whole grains, low-fat dairy products, lean meats, fish, and poultry. Each person is different.  Foods that may cause symptoms in one person may not cause any symptoms in another person. Work with your doctor to find foods that are safe for you.  The items listed above may not be a complete list of what you can eat and drink. Contact a food expert for more options.  What foods should I avoid?  Limiting some of these foods may help in managing the symptoms of GERD. Everyone is different. Talk with a food expert or your doctor to help you find the exact foods to avoid, if any.  Fruits  Any fruits prepared with added fat. Any fruits that cause symptoms. For some people, this may include citrus fruits, such as oranges, grapefruit, pineapple, and anant.  Vegetables  Deep-fried vegetables. French fries. Any vegetables prepared with added fat. Any vegetables that cause symptoms. For some people, this may include tomatoes and tomato products, chili peppers, onions and garlic, and horseradish.  Grains  Pastries or quick breads with added fat.  Meats and other proteins  High-fat meats, such as fatty beef or pork, hot dogs, ribs, ham, sausage, salami, and boyer. Fried meat or protein, including fried fish and fried chicken. Nuts and nut butters, in large amounts.  Dairy  Whole milk and chocolate milk. Sour cream. Cream. Ice cream. Cream cheese. Milkshakes.  Fats and oils  Butter. Margarine. Shortening. Ghee.  Beverages  Coffee and tea, with or without caffeine. Carbonated beverages. Sodas. Energy drinks. Fruit juice made with acidic fruits, such as orange or grapefruit. Tomato juice. Alcoholic drinks.  Sweets and desserts  Chocolate and cocoa. Donuts.  Seasonings and condiments  Pepper.  Peppermint and spearmint. Added salt. Any condiments, herbs, or seasonings that cause symptoms. For some people, this may include waldron, hot sauce, or vinegar-based salad dressings.  The items listed above may not be a complete list of what you should not eat and drink. Contact a food expert for more options.  Questions to ask your doctor  Diet and lifestyle changes are often the first steps that are taken to manage symptoms of GERD. If diet and lifestyle changes do not help, talk with your doctor about taking medicines.  Where to find more information  International [employer] for Gastrointestinal Disorders: aboutgerd.org  Summary  When you have GERD, food and lifestyle choices are very important in easing your symptoms.  Eat small meals often instead of 3 large meals a day. Eat your meals slowly and in a place where you are relaxed.  Avoid bending over or lying down until 2-3 hours after eating.  Limit high-fat foods such as fatty meats or fried foods.  This information is not intended to replace advice given to you by your health care provider. Make sure you discuss any questions you have with your health care provider.  Document Revised: 06/28/2021 Document Reviewed: 06/28/2021  Elsevier Patient Education © 2022 Else[employer] Inc[employer]

## 2022-10-05 NOTE — PROGRESS NOTES
Chief Complaint     Follow-up (GERD)    History of Present Illness     Yohana Casillas is a 71 y.o. female who presents to [facility] for follow-up of esophagitis and hiatal hernia.    She feels that increased dose of Protonix (40 mg) is working well for her.  Denies breakthrough symptoms.  Denies abdominal pain and dysphagia.  Reports a regular bowel pattern.  Denies hematochezia and melena.     History      Past Medical History:   Diagnosis Date   • Anxiety    • Cancer (HCC)    • Diabetes mellitus type I (HCC)    • GERD (gastroesophageal reflux disease)    • History of thyroplasty 07/2021   • Hyperlipidemia    • Hypertension    • Retinopathy    • Thyroid disease      Past Surgical History:   Procedure Laterality Date   • CARPAL TUNNEL RELEASE     • CHOLECYSTECTOMY     • COLONOSCOPY     • ENDOSCOPY N/A 10/28/2021    Procedure: ESOPHAGOGASTRODUODENOSCOPY with biopsies;  Surgeon: Nadia Sanz MD;  Location: formerly [facility] ENDOSCOPY;  Service: Gastroenterology;  Laterality: N/A;  esophagitis and gastritis   • HYSTERECTOMY     • THYROID SURGERY     • UPPER GASTROINTESTINAL ENDOSCOPY       Family History   Problem Relation Age of Onset   • Diabetes type II Other    • Cancer Mother    • Cancer Father    • Cancer Sister    • Hypertension Brother    • Diabetes Brother    • Malig Hyperthermia Neg Hx         Current Medications       Current Outpatient Medications:   •  amLODIPine (NORVASC) 5 MG tablet, TAKE 1 TABLET EVERY DAY, Disp: 90 tablet, Rfl: 1  •  aspirin 81 MG EC tablet, Aspirin Low Dose 81 mg oral tablet,delayed release (DR/EC) take 1 tablet (81 mg) by oral route once daily   Active, Disp: , Rfl:   •  escitalopram (LEXAPRO) 10 MG tablet, TAKE 1 TABLET EVERY DAY, Disp: 90 tablet, Rfl: 1  •  hydroCHLOROthiazide (HYDRODIURIL) 25 MG tablet, TAKE 1 TABLET EVERY DAY, Disp: 90 tablet, Rfl: 1  •  insulin aspart (novoLOG) 100 UNIT/ML injection, USE AS DIRECTED IN INSULIN PUMP **MAX 65  "UNITS PER DAY, Disp: 20 mL, Rfl: 5  •  levothyroxine (SYNTHROID, LEVOTHROID) 75 MCG tablet, TAKE ONE TABLET BY MOUTH DAILY, Disp: 90 tablet, Rfl: 1  •  lisinopril (PRINIVIL,ZESTRIL) 40 MG tablet, TAKE 1 TABLET EVERY DAY, Disp: 90 tablet, Rfl: 1  •  pantoprazole (PROTONIX) 40 MG EC tablet, Take 1 tablet by mouth Daily., Disp: 90 tablet, Rfl: 3  •  simvastatin (ZOCOR) 20 MG tablet, TAKE 1 TABLET EVERY NIGHT, Disp: 90 tablet, Rfl: 1  •  Sodium Fluoride 5000 Sensitive 1.1-5 % gel, Use twice daily, Disp: , Rfl:      Allergies     Allergies   Allergen Reactions   • Macrobid [Nitrofurantoin] GI Intolerance     Constipation, nausea, headache   • Sulfa Antibiotics Hives   • Levofloxacin Rash       Social History       Social History     Social History Narrative    , 2 adult child, lives at home with           Objective       /61 (BP Location: Left arm, Patient Position: Sitting, Cuff Size: Small Adult)   Pulse 59   Ht 157.5 cm (62\")   Wt 59.2 kg (130 lb 9.6 oz)   BMI 23.89 kg/m²       Physical Exam    Results       Result Review :                     Assessment and Plan              Diagnoses and all orders for this visit:    1. Esophagitis (Primary)  -     pantoprazole (PROTONIX) 40 MG EC tablet; Take 1 tablet by mouth Daily.  Dispense: 90 tablet; Refill: 3    2. Hiatal hernia        Due for screening colonoscopy 2024.        Follow Up     Follow Up   Return in about 1 year (around 10/5/2023) for GERD.  Patient was given instructions and counseling regarding her condition or for health maintenance advice. Please see specific information pulled into the AVS if appropriate.     "

## 2022-10-14 ENCOUNTER — OFFICE VISIT (OUTPATIENT)
Dept: DIABETES SERVICES | Facility: CLINIC | Age: 72
End: 2022-10-14

## 2022-10-14 VITALS
SYSTOLIC BLOOD PRESSURE: 122 MMHG | DIASTOLIC BLOOD PRESSURE: 63 MMHG | BODY MASS INDEX: 24.84 KG/M2 | HEART RATE: 66 BPM | WEIGHT: 135 LBS | HEIGHT: 62 IN | OXYGEN SATURATION: 97 %

## 2022-10-14 DIAGNOSIS — E10.9 CONTROLLED DIABETES MELLITUS TYPE 1 WITHOUT COMPLICATIONS: Primary | ICD-10-CM

## 2022-10-14 DIAGNOSIS — Z97.8 USES SELF-APPLIED CONTINUOUS GLUCOSE MONITORING DEVICE: ICD-10-CM

## 2022-10-14 DIAGNOSIS — Z96.41 INSULIN PUMP IN PLACE: ICD-10-CM

## 2022-10-14 LAB
EXPIRATION DATE: ABNORMAL
HBA1C MFR BLD: 6.3 %
Lab: ABNORMAL

## 2022-10-14 PROCEDURE — 99213 OFFICE O/P EST LOW 20 MIN: CPT | Performed by: NURSE PRACTITIONER

## 2022-10-14 PROCEDURE — 95251 CONT GLUC MNTR ANALYSIS I&R: CPT | Performed by: NURSE PRACTITIONER

## 2022-10-14 RX ORDER — INSULIN ASPART 100 [IU]/ML
INJECTION, SOLUTION INTRAVENOUS; SUBCUTANEOUS
Qty: 20 ML | Refills: 5 | Status: SHIPPED | OUTPATIENT
Start: 2022-10-14

## 2022-10-26 ENCOUNTER — OFFICE VISIT (OUTPATIENT)
Dept: UROLOGY | Facility: CLINIC | Age: 72
End: 2022-10-26

## 2022-10-26 VITALS — BODY MASS INDEX: 24.73 KG/M2 | WEIGHT: 134.4 LBS | RESPIRATION RATE: 12 BRPM | HEIGHT: 62 IN

## 2022-10-26 DIAGNOSIS — N30.20 CHRONIC CYSTITIS: ICD-10-CM

## 2022-10-26 DIAGNOSIS — N95.2 ATROPHIC VAGINITIS: Primary | ICD-10-CM

## 2022-10-26 DIAGNOSIS — N39.0 RECURRENT UTI: ICD-10-CM

## 2022-10-26 PROCEDURE — 99214 OFFICE O/P EST MOD 30 MIN: CPT | Performed by: UROLOGY

## 2022-10-26 RX ORDER — CONJUGATED ESTROGENS 0.62 MG/G
CREAM VAGINAL 2 TIMES WEEKLY
Qty: 30 G | Refills: 12 | Status: SHIPPED | OUTPATIENT
Start: 2022-10-27

## 2022-10-26 NOTE — PROGRESS NOTES
UROLOGY OFFICE follow-up NOTE    Subjective   HPI  Yohana Casillas is a 72 y.o. female. History of cystitis. She is also a diabetic and has been having complaints of frequency, urgency, and dysuria. She was having problems with chronic infections until she started on Premarin vaginal cream and now has been doing well. She did have an episode where she was burning and going to the bathroom frequently. She started on antibiotic therapy and then it stopped. She is not having any symptoms today.     Update 1/15/19: Patient presents for annual follow-up of urinary symptoms and atrophic vaginitis.  Patient states that she has been doing well for quite some time but is recently status post cholecystectomy.  She reports 2 UTIs prior to cholecystectomy and once since.  Treated with empiric antibiotics.  She is unsure if cultures were obtained; not available for review.  Patient in addition to antibiotics took pyridium with relief of UTI symptoms.  Patient was also out of Premarin cream prior to UTIs.  Otherwise patient denies new complaints or changes to history since last visit.     Update 7/30/2019: Presents for routine follow-up.  Patient notes only one UTI since last visit which was treated without issue via antibiotics.  Has improved urinary urgency.  Denies urinary complaints today.  Patient continues to apply vaginal estrogen cream twice weekly.  Requests refills.     Update 9/3/20:  Presents for annual follow up; 2 uti over the last year. Urinary urgency is currently tolerable. Takes AZO on occasion; bladder irritated with tomatoes.  Continues to use estrogen cream weekly.     Update 10/21/2021: Patient presents for routine annual visit. States she was on 2-week vacation; having some irritation with voiding, slight burning, and pelvic pressure. Unsure if she has UTI. Needs refill of vaginal estrogen cream.  Thinks she may have had 1 uti since last visit.  Overall doing well.    Update 10/26/2022: Patient presents  "for routine annual visit for recurrent UTI, vaginal atrophy, and chronic cystitis. The patient reports that Premarin cream was DC'd by PCP due to osteoporosis. She states that she was using the cream 2 times per week.  Has been off of it for about a month.  The patient reports that she had a severe urinary tract infection approximately 6 to 8 weeks ago. She states that she went to urgent care at [facility] and was given an antibiotic, but it did not clear up. The patient notes that she had to go back again and was given another antibiotic. She states that they did a urine culture. The patient reports that she is feeling well today.  No new complaints.    Review of systems  A review of systems was performed, and positive findings are noted in the HPI.    Objective     Vital Signs:   Resp 12   Ht 157.5 cm (62\")   Wt 61 kg (134 lb 6.4 oz)   BMI 24.58 kg/m²       Physical exam  No acute distress, well-nourished  Awake alert and oriented  Mood normal; affect normal    Problem List:  Patient Active Problem List   Diagnosis   • Anxiety   • Hypertension   • Gastroesophageal reflux disease   • Heart murmur   • Hyperlipidemia   • Seasonal allergic rhinitis   • Type I diabetes mellitus, well controlled ([facility])   • Vocal cord paralysis   • Epigastric pain   • Palpitations   • Shortness of breath   • Atrophic vaginitis   • Dysphagia   • Pain in right foot   • Other allergy, initial encounter   • Hypercholesterolemia   • Recurrent urinary tract infection   • Chronic cystitis   • Routine history and physical examination of adult   • Encounter for immunization   • Postmenopausal state   • Encounter for screening mammogram for malignant neoplasm of breast   • Controlled type 1 diabetes mellitus with hyperglycemia, with long-term current use of insulin ([facility])   • Dysuria       Assessment & Plan   Diagnoses and all orders for this visit:    1. Atrophic vaginitis (Primary)  -     Estrogens Conjugated (Premarin) 0.625 MG/GM vaginal " cream; Insert  into the vagina 2 (Two) Times a Week. Pea sized amount  Dispense: 30 g; Refill: 12    2. Recurrent UTI  -     Estrogens Conjugated (Premarin) 0.625 MG/GM vaginal cream; Insert  into the vagina 2 (Two) Times a Week. Pea sized amount  Dispense: 30 g; Refill: 12    3. Chronic cystitis  -     Estrogens Conjugated (Premarin) 0.625 MG/GM vaginal cream; Insert  into the vagina 2 (Two) Times a Week. Pea sized amount  Dispense: 30 g; Refill: 12      Recommend resuming vaginal estrogen cream; discussed that the localized estrogen cream has very minuscule systemic absorption thus is not contraindicated in women with osteoporosis.  Patient has been on this medication for a number of years, started by Dr. Ferris.  Aware that it is one of the best tools for UTI prevention and management of chronic cystitis symptoms in the postmenopausal lady.  Patient agreeable to resume the cream; request refill  Prescription sent to pharmacy    Emphasized the importance of requesting and obtaining a urine culture when seeking treatment for UTI.  Patient aware that she can call office as symptoms arise so that urine culture may be obtained and treatment administered per culture sensitivities as appropriate.  Continue on-demand treatment for UTI per culture sensitivities    Encouraged hydration, not delaying emptying, and cranberry supplement.    Follow-up in 1 year or earlier as needed   All questions and concerns have been addressed at this time.      MDM moderate: 2 stable chronic illnesses; prescription drug management    Transcribed from ambient dictation for Leticia Bustillos MD by Joleen Quesada.  10/26/22   11:27 EDT    Patient or patient representative verbalized consent to the visit recording.  I have personally performed the services described in this document as transcribed by the above individual, and it is both accurate and complete.

## 2022-11-23 ENCOUNTER — OFFICE VISIT (OUTPATIENT)
Dept: FAMILY MEDICINE CLINIC | Age: 72
End: 2022-11-23

## 2022-11-23 VITALS
SYSTOLIC BLOOD PRESSURE: 130 MMHG | OXYGEN SATURATION: 97 % | RESPIRATION RATE: 16 BRPM | DIASTOLIC BLOOD PRESSURE: 65 MMHG | HEIGHT: 62 IN | WEIGHT: 132.6 LBS | BODY MASS INDEX: 24.4 KG/M2 | HEART RATE: 50 BPM

## 2022-11-23 DIAGNOSIS — Z12.31 SCREENING MAMMOGRAM FOR BREAST CANCER: ICD-10-CM

## 2022-11-23 DIAGNOSIS — R29.898 WEAKNESS OF BOTH LEGS: ICD-10-CM

## 2022-11-23 DIAGNOSIS — Z78.0 POSTMENOPAUSAL: ICD-10-CM

## 2022-11-23 DIAGNOSIS — E78.00 HYPERCHOLESTEROLEMIA: ICD-10-CM

## 2022-11-23 DIAGNOSIS — E03.9 ACQUIRED HYPOTHYROIDISM: ICD-10-CM

## 2022-11-23 DIAGNOSIS — Z00.00 ROUTINE GENERAL MEDICAL EXAMINATION AT A HEALTH CARE FACILITY: Primary | ICD-10-CM

## 2022-11-23 PROCEDURE — 1170F FXNL STATUS ASSESSED: CPT | Performed by: NURSE PRACTITIONER

## 2022-11-23 PROCEDURE — 1159F MED LIST DOCD IN RCRD: CPT | Performed by: NURSE PRACTITIONER

## 2022-11-23 PROCEDURE — 1126F AMNT PAIN NOTED NONE PRSNT: CPT | Performed by: NURSE PRACTITIONER

## 2022-11-23 PROCEDURE — 96160 PT-FOCUSED HLTH RISK ASSMT: CPT | Performed by: NURSE PRACTITIONER

## 2022-11-23 PROCEDURE — G0439 PPPS, SUBSEQ VISIT: HCPCS | Performed by: NURSE PRACTITIONER

## 2022-11-23 NOTE — PROGRESS NOTES
The ABCs of the Annual Wellness Visit  Subsequent Medicare Wellness Visit    Chief Complaint   Patient presents with   • Medicare Wellness-subsequent      Subjective    History of Present Illness:  Yohana Casillas is a 72 y.o. female who presents for a Subsequent Medicare Wellness Visit.    Medicare wellness HPI  Exercises regularly: yes   Eats healthy tries   Last mammogram: missed her last mammogram and was getting in [place]   Last DEXA: 11- osteoporosis lumbar spine   Last pap smear: s/p hysterectomy   BSE: yes   Wears seatbelts: yes   Living will: needs to update hers, gave a booklet today   [facility]ometrist: [facility]   Dentist: Dr Lindsay   Tobacco: none   Alcohol intake: none   Drugs: none   Falls: none   Colonoscopy: 2014  Immunizations: 3 covid vaccines, had flu vaccine at work Tdap 7-2012, zostavac    PAST MEDICAL HISTORY changes since 1-2022:     Got her moderna covid booster at work       Hyperlipidemia: Hypercholesterolemia;     Hypertension     Type 1 Diabetes: controlled;     retinopathy, now being monitored, per Dr. Johnny MCKEON +       Hospitalizations: uti and drug reaction , at [facility]         CURRENT MEDICAL PROVIDERS:    Cardiologist: Reese    Urologist: dr sara Key NP/CDE         PREVENTIVE HEALTH MAINTENANCE             COLORECTAL CANCER SCREENING: colonoscopy with normal results     Hepatitis C Medicare Screening: was last done        PAP SMEAR: hysterectomy         Surgical History:       EGD     Hysterectomy: Partial;     thyroidplasty 12-22-11     Thyroidectomy: small portion remains;     Bilateral Tubal Ligation    Bilateral Carpal Tunnel;    right index finger, trigger release and right wrist cyst removed 12-21-11; Procedures: colonoscopy 2004     Cataract Removal: bilateral; 9&10- 2016;     Cholecystectomy: laparoscopic; 1-4-19;     Procedures:    Colonoscopy ( 12-18-14 )    EGD ( 12-18-14 ) left vocal cord surgery 9-26-19          Family History:         Positive for Hypertension ( brother ).      Positive for Lung Cancer ( father; mother ).      Positive for Seizure Disorder ( brother ).  Father:  at age 63; Cause of death was lung cancer     Mother:  at age 69; Cause of death was adrenal cancer;  Lung Cancer         Social History:     Occupation: [employer] 3-4 days a week     Marital Status:      Children: 2 children     The following portions of the patient's history were reviewed and   updated as appropriate: allergies, current medications, past family history, past medical history, past social history, past surgical history and problem list.    Compared to one year ago, the patient feels her physical   health is the same.    Compared to one year ago, the patient feels her mental   health is the same.    Recent Hospitalizations:  She was not admitted to the hospital during the last year.       Current Medical Providers:  Patient Care Team:  Shanthi Caceres APRN as PCP - General (Family Medicine)  Leticia Bustillos MD as Consulting Physician (Urology)    Outpatient Medications Prior to Visit   Medication Sig Dispense Refill   • amLODIPine (NORVASC) 5 MG tablet TAKE 1 TABLET EVERY DAY 90 tablet 1   • aspirin 81 MG EC tablet Aspirin Low Dose 81 mg oral tablet,delayed release (DR/EC) take 1 tablet (81 mg) by oral route once daily   Active     • escitalopram (LEXAPRO) 10 MG tablet TAKE 1 TABLET EVERY DAY 90 tablet 1   • Estrogens Conjugated (Premarin) 0.625 MG/GM vaginal cream Insert  into the vagina 2 (Two) Times a Week. Pea sized amount 30 g 12   • hydroCHLOROthiazide (HYDRODIURIL) 25 MG tablet TAKE 1 TABLET EVERY DAY 90 tablet 1   • Insulin Aspart (NovoLOG) 100 UNIT/ML injection Up to 65 units per day per insulin pump 20 mL 5   • levothyroxine (SYNTHROID, LEVOTHROID) 75 MCG tablet TAKE ONE TABLET BY MOUTH DAILY 90 tablet 1   • lisinopril (PRINIVIL,ZESTRIL) 40 MG tablet TAKE 1 TABLET EVERY DAY 90  tablet 1   • pantoprazole (PROTONIX) 40 MG EC tablet Take 1 tablet by mouth Daily. 90 tablet 3   • simvastatin (ZOCOR) 20 MG tablet TAKE 1 TABLET EVERY NIGHT 90 tablet 1   • Sodium Fluoride 5000 Sensitive 1.1-5 % gel Use twice daily       No facility-administered medications prior to visit.       No opioid medication identified on active medication list. I have reviewed chart for other potential  high risk medication/s and harmful drug interactions in the elderly.          Aspirin is on active medication list. Aspirin use is indicated based on review of current medical condition/s. Pros and cons of this therapy have been discussed today. Benefits of this medication outweigh potential harm.  Patient has been encouraged to continue taking this medication.  .      Patient Active Problem List   Diagnosis   • Anxiety   • Hypertension   • Gastroesophageal reflux disease   • Heart murmur   • Hyperlipidemia   • Seasonal allergic rhinitis   • Type I diabetes mellitus, well controlled ([facility])   • Vocal cord paralysis   • Epigastric pain   • Palpitations   • Shortness of breath   • Atrophic vaginitis   • Dysphagia   • Pain in right foot   • Other allergy, initial encounter   • Hypercholesterolemia   • Recurrent urinary tract infection   • Chronic cystitis   • Routine history and physical examination of adult   • Encounter for immunization   • Postmenopausal state   • Encounter for screening mammogram for malignant neoplasm of breast   • Controlled type 1 diabetes mellitus with hyperglycemia, with long-term current use of insulin ([facility])   • Dysuria   • Routine general medical examination at a health care facility   • Postmenopausal   • Screening mammogram for breast cancer   • Acquired hypothyroidism   • Weakness of both legs     Advance Care Planning  Advance Directive is not on file.  ACP discussion was held with the patient during this visit. Patient does not have an advance directive, information provided.    Review of Systems  "  Constitutional: Negative for fatigue and fever.   HENT: Negative for sore throat.    Eyes: Negative for visual disturbance.   Respiratory: Negative for cough and shortness of breath.    Cardiovascular: Negative for chest pain, palpitations and leg swelling.   Gastrointestinal: Negative for abdominal pain.   Genitourinary: Negative for difficulty urinating.   Musculoskeletal: Negative for arthralgias.   Skin: Negative for rash.   Neurological: Positive for weakness (in legs at times when she walks ).   Hematological: Negative for adenopathy.   Psychiatric/Behavioral: Negative for sleep disturbance.        Objective    Vitals:    11/23/22 1253   BP: 130/65   BP Location: Right arm   Patient Position: Sitting   Cuff Size: Adult   Pulse: 50   Resp: 16   SpO2: 97%  Comment: room air   Weight: 60.1 kg (132 lb 9.6 oz)   Height: 157.5 cm (62\")   PainSc: 0-No pain     Estimated body mass index is 24.25 kg/m² as calculated from the following:    Height as of this encounter: 157.5 cm (62\").    Weight as of this encounter: 60.1 kg (132 lb 9.6 oz).    BMI is within normal parameters. No other follow-up for BMI required.      Does the patient have evidence of cognitive impairment? No    Physical Exam  Vitals reviewed.   Constitutional:       Appearance: Normal appearance. She is well-developed.   HENT:      Head: Normocephalic and atraumatic.      Right Ear: External ear normal.      Left Ear: External ear normal.      Mouth/Throat:      Pharynx: No oropharyngeal exudate.   Eyes:      Conjunctiva/sclera: Conjunctivae normal.      Pupils: Pupils are equal, round, and reactive to light.   Cardiovascular:      Rate and Rhythm: Normal rate and regular rhythm.      Heart sounds: No murmur heard.    No friction rub. No gallop.   Pulmonary:      Effort: Pulmonary effort is normal.      Breath sounds: Normal breath sounds. No wheezing or rhonchi.   Chest:   Breasts:     Right: Normal. No mass, nipple discharge or skin change.      " Left: Normal. No mass, nipple discharge or skin change.   Abdominal:      General: Bowel sounds are normal. There is no distension.      Palpations: Abdomen is soft.      Tenderness: There is no abdominal tenderness.   Lymphadenopathy:      Upper Body:      Right upper body: No axillary adenopathy.      Left upper body: No axillary adenopathy.   Skin:     General: Skin is warm and dry.   Neurological:      Mental Status: She is alert and oriented to person, place, and time.      Cranial Nerves: No cranial nerve deficit.   Psychiatric:         Mood and Affect: Mood and affect normal.         Behavior: Behavior normal.         Thought Content: Thought content normal.         Judgment: Judgment normal.       Lab Results   Component Value Date    HGBA1C 6.3 10/14/2022            HEALTH RISK ASSESSMENT    Smoking Status:  Social History     Tobacco Use   Smoking Status Never   Smokeless Tobacco Never     Alcohol Consumption:  Social History     Substance and Sexual Activity   Alcohol Use Not Currently     Fall Risk Screen:    IVETT Fall Risk Assessment was completed, and patient is at LOW risk for falls.Assessment completed on:11/23/2022    Depression Screening:  PHQ-2/PHQ-9 Depression Screening 1/3/2022   Retired PHQ-9 Total Score 0   Retired Total Score 0       Health Habits and Functional and Cognitive Screening:  Functional & Cognitive Status 11/23/2022   Do you have difficulty preparing food and eating? No   Do you have difficulty bathing yourself, getting dressed or grooming yourself? No   Do you have difficulty using the toilet? No   Do you have difficulty moving around from place to place? No   Do you have trouble with steps or getting out of a bed or a chair? No   Current Diet Well Balanced Diet   Dental Exam Up to date   Eye Exam Up to date   Exercise (times per week) 4 times per week   Current Exercises Include Home Exercise Program (TV, Computer, Etc.)   Do you need help using the phone?  No   Are you deaf  or do you have serious difficulty hearing?  No   Do you need help with transportation? No   Do you need help shopping? No   Do you need help preparing meals?  No   Do you need help with housework?  No   Do you need help with laundry? No   Do you need help taking your medications? No   Do you need help managing money? No   Do you ever drive or ride in a car without wearing a seat belt? No   Have you felt unusual stress, anger or loneliness in the last month? No   Who do you live with? Spouse   If you need help, do you have trouble finding someone available to you? No   Have you been bothered in the last four weeks by sexual problems? No   Do you have difficulty concentrating, remembering or making decisions? No       Age-appropriate Screening Schedule:  Refer to the list below for future screening recommendations based on patient's age, sex and/or medical conditions. Orders for these recommended tests are listed in the plan section. The patient has been provided with a written plan.    Health Maintenance   Topic Date Due   • ZOSTER VACCINE (2 of 3) 10/30/2013   • URINE MICROALBUMIN  05/05/2021   • TDAP/TD VACCINES (2 - Td or Tdap) 07/11/2022   • INFLUENZA VACCINE  03/31/2023 (Originally 8/1/2022)   • LIPID PANEL  01/10/2023   • DIABETIC FOOT EXAM  01/26/2023   • MAMMOGRAM  02/10/2023   • HEMOGLOBIN A1C  04/14/2023   • DIABETIC EYE EXAM  10/14/2023   • DXA SCAN  11/29/2023              Assessment & Plan   CMS Preventative Services Quick Reference  Risk Factors Identified During Encounter  Immunizations Discussed/Encouraged (specific Immunizations; Prevnar 20 (Pneumococcal 20-valent conjugate), Shingrix and COVID19  The above risks/problems have been discussed with the patient.  Follow up actions/plans if indicated are seen below in the Assessment/Plan Section.  Pertinent information has been shared with the patient in the After Visit Summary.    Diagnoses and all orders for this visit:    1. Routine general medical  examination at a health care facility (Primary)  Assessment & Plan:  Advise regular exercise, healthy eating, always wear seat belts. Living will discussed, will update, booklet given, fall prevention discussed.  Immunizations discussed. Advised pneum 20, shingrex and covid booster, declines, to get an update of Tdap check with pharmacy   To continue yearly optometry and dental exams.          2. Postmenopausal    3. Screening mammogram for breast cancer  Assessment & Plan:  Continue breast exams, set up mammogram     Orders:  -     Cancel: Mammo Screening Digital Tomosynthesis Bilateral With CAD; Future  -     Mammo Screening Digital Tomosynthesis Bilateral With CAD; Future    4. Hypercholesterolemia  -     Comprehensive Metabolic Panel; Future  -     Lipid Panel; Future    5. Acquired hypothyroidism  -     TSH Rfx On Abnormal To Free T4; Future    6. Weakness of both legs  Assessment & Plan:  Checking labs next week, checking Mg, discussed exercise     Orders:  -     Magnesium; Future      Follow Up:   Return for fasting for labs.     An After Visit Summary and PPPS were made available to the patient.

## 2022-11-23 NOTE — ASSESSMENT & PLAN NOTE
Advise regular exercise, healthy eating, always wear seat belts. Living will discussed, will update, booklet given, fall prevention discussed.  Immunizations discussed. Advised pneum 20, shingrex and covid booster, declines, to get an update of Tdap check with pharmacy   To continue yearly optometry and dental exams.

## 2022-12-05 ENCOUNTER — TELEPHONE (OUTPATIENT)
Dept: FAMILY MEDICINE CLINIC | Age: 72
End: 2022-12-05

## 2022-12-05 NOTE — TELEPHONE ENCOUNTER
----- Message from Janee Rowland LPN sent at 12/5/2022  8:07 AM EST -----      ----- Message -----  From: SYSTEM  Sent: 12/5/2022   1:11 AM EST  To: [facility] [place] [facility] [place]

## 2022-12-07 ENCOUNTER — LAB (OUTPATIENT)
Dept: LAB | Facility: HOSPITAL | Age: 72
End: 2022-12-07

## 2022-12-07 DIAGNOSIS — E03.9 ACQUIRED HYPOTHYROIDISM: ICD-10-CM

## 2022-12-07 DIAGNOSIS — R29.898 WEAKNESS OF BOTH LEGS: ICD-10-CM

## 2022-12-07 DIAGNOSIS — E78.00 HYPERCHOLESTEROLEMIA: ICD-10-CM

## 2022-12-07 LAB
ALBUMIN SERPL-MCNC: 4 G/DL (ref 3.5–5.2)
ALBUMIN/GLOB SERPL: 1.6 G/DL
ALP SERPL-CCNC: 70 U/L (ref 39–117)
ALT SERPL W P-5'-P-CCNC: 13 U/L (ref 1–33)
ANION GAP SERPL CALCULATED.3IONS-SCNC: 9 MMOL/L (ref 5–15)
AST SERPL-CCNC: 20 U/L (ref 1–32)
BILIRUB SERPL-MCNC: 0.4 MG/DL (ref 0–1.2)
BUN SERPL-MCNC: 14 MG/DL (ref 8–23)
BUN/CREAT SERPL: 19.4 (ref 7–25)
CALCIUM SPEC-SCNC: 9.3 MG/DL (ref 8.6–10.5)
CHLORIDE SERPL-SCNC: 103 MMOL/L (ref 98–107)
CHOLEST SERPL-MCNC: 183 MG/DL (ref 0–200)
CO2 SERPL-SCNC: 28 MMOL/L (ref 22–29)
CREAT SERPL-MCNC: 0.72 MG/DL (ref 0.57–1)
EGFRCR SERPLBLD CKD-EPI 2021: 90.1 ML/MIN/1.73
GLOBULIN UR ELPH-MCNC: 2.5 GM/DL
GLUCOSE SERPL-MCNC: 160 MG/DL (ref 65–99)
HDLC SERPL-MCNC: 74 MG/DL (ref 40–60)
LDLC SERPL CALC-MCNC: 98 MG/DL (ref 0–100)
LDLC/HDLC SERPL: 1.32 {RATIO}
MAGNESIUM SERPL-MCNC: 2.2 MG/DL (ref 1.6–2.4)
POTASSIUM SERPL-SCNC: 4.5 MMOL/L (ref 3.5–5.2)
PROT SERPL-MCNC: 6.5 G/DL (ref 6–8.5)
SODIUM SERPL-SCNC: 140 MMOL/L (ref 136–145)
TRIGL SERPL-MCNC: 55 MG/DL (ref 0–150)
TSH SERPL DL<=0.05 MIU/L-ACNC: 0.69 UIU/ML (ref 0.27–4.2)
VLDLC SERPL-MCNC: 11 MG/DL (ref 5–40)

## 2022-12-07 PROCEDURE — 80053 COMPREHEN METABOLIC PANEL: CPT

## 2022-12-07 PROCEDURE — 84443 ASSAY THYROID STIM HORMONE: CPT

## 2022-12-07 PROCEDURE — 80061 LIPID PANEL: CPT

## 2022-12-07 PROCEDURE — 83735 ASSAY OF MAGNESIUM: CPT

## 2022-12-07 PROCEDURE — 36415 COLL VENOUS BLD VENIPUNCTURE: CPT

## 2022-12-21 NOTE — PROGRESS NOTES
[facility]  Cardiology progress Note    Patient Name: Yohana Casillas  : 1950    CHIEF COMPLAINT  Hypertension        Subjective   Subjective     HISTORY OF PRESENT ILLNESS    Yohana Casillas is a 72 y.o. female with history of hypertension and shortness of breath.  Shortness of breath is improved.    REVIEW OF SYSTEMS    Constitutional:    No fever, no weight loss  Skin:     No rash  Otolaryngeal:    No difficulty swallowing  Cardiovascular: See HPI.  Pulmonary:    No cough, no sputum production    Personal History     Social History:    reports that she has never smoked. She has never used smokeless tobacco. She reports that she does not drink alcohol and does not use drugs.    Home Medications:  Current Outpatient Medications on File Prior to Visit   Medication Sig   • amLODIPine (NORVASC) 5 MG tablet TAKE 1 TABLET EVERY DAY   • aspirin 81 MG EC tablet Aspirin Low Dose 81 mg oral tablet,delayed release (DR/EC) take 1 tablet (81 mg) by oral route once daily   Active   • escitalopram (LEXAPRO) 10 MG tablet TAKE 1 TABLET EVERY DAY   • Estrogens Conjugated (Premarin) 0.625 MG/GM vaginal cream Insert  into the vagina 2 (Two) Times a Week. Pea sized amount   • hydroCHLOROthiazide (HYDRODIURIL) 25 MG tablet TAKE 1 TABLET EVERY DAY   • Insulin Aspart (NovoLOG) 100 UNIT/ML injection Up to 65 units per day per insulin pump   • levothyroxine (SYNTHROID, LEVOTHROID) 75 MCG tablet TAKE ONE TABLET BY MOUTH DAILY   • lisinopril (PRINIVIL,ZESTRIL) 40 MG tablet TAKE 1 TABLET EVERY DAY   • pantoprazole (PROTONIX) 40 MG EC tablet Take 1 tablet by mouth Daily.   • simvastatin (ZOCOR) 20 MG tablet TAKE 1 TABLET EVERY NIGHT   • Sodium Fluoride 5000 Sensitive 1.1-5 % gel Use twice daily     No current facility-administered medications on file prior to visit.       Past Medical History:   Diagnosis Date   • Anxiety    • Cancer (HCC)    • Diabetes mellitus type I (HCC)    • GERD (gastroesophageal reflux disease)    •  History of thyroplasty 07/2021   • Hyperlipidemia    • Hypertension    • Retinopathy    • Thyroid disease        Allergies:  Allergies   Allergen Reactions   • Macrobid [Nitrofurantoin] GI Intolerance     Constipation, nausea, headache   • Sulfa Antibiotics Hives     Other reaction(s): rash   • Levofloxacin Rash       Objective    Objective       Vitals:   Heart Rate:  [48] 48  BP: (142)/(62) 142/62  Body mass index is 24.14 kg/m².     PHYSICAL EXAM:    General Appearance:   · well developed  · well nourished  HENT:   · oropharynx moist  · lips not cyanotic  Neck:  · thyroid not enlarged  · supple  Respiratory:  · no respiratory distress  · normal breath sounds  · no rales  Cardiovascular:  · no jugular venous distention  · regular rhythm  · apical impulse normal  · S1 normal, S2 normal  · no S3, no S4   · no murmur  · no rub, no thrill  · carotid pulses normal; no bruit  · pedal pulses normal  · lower extremity edema: none    Skin:   · warm, dry  Psychiatric:  · judgement and insight appropriate  · normal mood and affect        Result Review:  I have personally reviewed the available results from  [x]  Laboratory  [x]  EKG  [x]  Cardiology  [x]  Medications  [x]  Old records  []  Other:     Procedures  Lab Results   Component Value Date    CHOL 183 12/07/2022    CHOL 198 01/10/2022    CHOL 165 09/13/2021     Lab Results   Component Value Date    TRIG 55 12/07/2022    TRIG 50 01/10/2022    TRIG 64 09/13/2021     Lab Results   Component Value Date    HDL 74 (H) 12/07/2022    HDL 87 (H) 01/10/2022    HDL 71 (H) 09/13/2021     Lab Results   Component Value Date    LDL 98 12/07/2022     (H) 01/10/2022    LDL 81 09/13/2021     Lab Results   Component Value Date    VLDL 11 12/07/2022    VLDL 9 01/10/2022    VLDL 13 09/13/2021     Results for orders placed in visit on 12/11/21    Adult Transthoracic Echo Complete W/ Cont if Necessary Per Protocol    Interpretation Summary  Fibrocalcific mitral and aortic  valves.  Normal left ventricular systolic function.  Mild MR.  Mild TR.  Mild AI.     Impression/Plan:  1.  Essential hypertension controlled: Continue Zestril 40 mg a day.  Continue amlodipine 5 mg a day.  Blood pressure control at home.  2.  Hyperlipidemia: Continue Zocor 20 mg a day.  Lipid profile reviewed shows LDL of 98.  3.  Shortness of breath improved: Continue hydrochlorothiazide 25 mg a day.  Echocardiogram showed normal left ventricular systolic function.  4.  Mild valvular heart disease/mild mitral gestation: Asymptomatic.  5.  Hypothyroidism: Continue Synthroid 75 mcg a day.           Dameon Leigh MD   12/27/22   09:15 EST

## 2022-12-27 ENCOUNTER — OFFICE VISIT (OUTPATIENT)
Dept: CARDIOLOGY | Facility: CLINIC | Age: 72
End: 2022-12-27

## 2022-12-27 VITALS
BODY MASS INDEX: 24.29 KG/M2 | DIASTOLIC BLOOD PRESSURE: 62 MMHG | HEART RATE: 48 BPM | HEIGHT: 62 IN | WEIGHT: 132 LBS | SYSTOLIC BLOOD PRESSURE: 142 MMHG

## 2022-12-27 DIAGNOSIS — I10 HYPERTENSION, ESSENTIAL: Primary | ICD-10-CM

## 2022-12-27 DIAGNOSIS — E78.2 HYPERLIPEMIA, MIXED: ICD-10-CM

## 2022-12-27 DIAGNOSIS — R06.02 SHORTNESS OF BREATH: ICD-10-CM

## 2022-12-27 DIAGNOSIS — E02 SUBCLINICAL IODINE-DEFICIENCY HYPOTHYROIDISM: ICD-10-CM

## 2022-12-27 PROCEDURE — 99214 OFFICE O/P EST MOD 30 MIN: CPT | Performed by: SPECIALIST

## 2023-02-14 ENCOUNTER — OFFICE VISIT (OUTPATIENT)
Dept: PODIATRY | Facility: CLINIC | Age: 73
End: 2023-02-14
Payer: MEDICARE

## 2023-02-14 VITALS
BODY MASS INDEX: 24.84 KG/M2 | HEART RATE: 54 BPM | TEMPERATURE: 97.5 F | HEIGHT: 62 IN | OXYGEN SATURATION: 100 % | DIASTOLIC BLOOD PRESSURE: 41 MMHG | SYSTOLIC BLOOD PRESSURE: 159 MMHG | WEIGHT: 135 LBS

## 2023-02-14 DIAGNOSIS — E11.8 DIABETIC FOOT: ICD-10-CM

## 2023-02-14 DIAGNOSIS — E11.9 TYPE 2 DIABETES MELLITUS WITHOUT COMPLICATION, WITH LONG-TERM CURRENT USE OF INSULIN: Primary | ICD-10-CM

## 2023-02-14 DIAGNOSIS — Z79.4 TYPE 2 DIABETES MELLITUS WITHOUT COMPLICATION, WITH LONG-TERM CURRENT USE OF INSULIN: Primary | ICD-10-CM

## 2023-02-14 PROCEDURE — 99213 OFFICE O/P EST LOW 20 MIN: CPT | Performed by: PODIATRIST

## 2023-02-14 PROCEDURE — G8404 LOW EXTEMITY NEUR EXAM DOCUM: HCPCS | Performed by: PODIATRIST

## 2023-02-14 NOTE — PROGRESS NOTES
[facility] - PODIATRY    Today's Date: 02/14/23    Patient Name: Yohana Casillas  MRN: [id number]  CSN: [id number]  PCP: Shanthi Caceres APRN, Last PCP Visit: 23 November 2022  Referring Provider: No ref. provider found    SUBJECTIVE     Chief Complaint   Patient presents with   • Left Foot - Annual Exam, Diabetes   • Right Foot - Annual Exam, Diabetes     HPI: Yohana Casillas, a 72 y.o.female, presents to clinic for a diabetic foot evaluation.    New, Established, New Problem: Established    Onset: Insidious    Nature: IDDM    Stable, worsening, improving: Stable    Patient controlling diabetes via: Insulin pumps with CGM    Patient states their most recent blood glucose reading was 97.    Patient denies any fevers, chills, nausea, vomiting, shortness of breath, nor any other constitutional signs nor symptoms.    Medical changes: None.    Past Medical History:   Diagnosis Date   • Anxiety    • Cancer (HCC)    • Diabetes mellitus type I (HCC)    • GERD (gastroesophageal reflux disease)    • History of thyroplasty 07/2021   • Hyperlipidemia    • Hypertension    • Retinopathy    • Thyroid disease      Past Surgical History:   Procedure Laterality Date   • CARPAL TUNNEL RELEASE     • CHOLECYSTECTOMY     • COLONOSCOPY     • ENDOSCOPY N/A 10/28/2021    Procedure: ESOPHAGOGASTRODUODENOSCOPY with biopsies;  Surgeon: Nadia Sanz MD;  Location: [facility] ENDOSCOPY;  Service: Gastroenterology;  Laterality: N/A;  esophagitis and gastritis   • HYSTERECTOMY     • THYROID SURGERY     • UPPER GASTROINTESTINAL ENDOSCOPY       Family History   Problem Relation Age of Onset   • Diabetes type II Other    • Cancer Mother    • Cancer Father    • Cancer Sister    • Hypertension Brother    • Diabetes Brother    • Malig Hyperthermia Neg Hx      Social History     Socioeconomic History   • Marital status:    • Number of children: 2   Tobacco Use   • Smoking status: Never   • Smokeless tobacco: Never    Vaping Use   • Vaping Use: Never used   Substance and Sexual Activity   • Alcohol use: Never   • Drug use: Never   • Sexual activity: Not Currently     Partners: Male     Birth control/protection: Hysterectomy     Allergies   Allergen Reactions   • Macrobid [Nitrofurantoin] GI Intolerance     Constipation, nausea, headache   • Sulfa Antibiotics Hives     Other reaction(s): rash   • Levofloxacin Rash     Current Outpatient Medications   Medication Sig Dispense Refill   • amLODIPine (NORVASC) 5 MG tablet TAKE 1 TABLET EVERY DAY 90 tablet 1   • aspirin 81 MG EC tablet Aspirin Low Dose 81 mg oral tablet,delayed release (DR/EC) take 1 tablet (81 mg) by oral route once daily   Active     • escitalopram (LEXAPRO) 10 MG tablet TAKE 1 TABLET EVERY DAY 90 tablet 1   • Estrogens Conjugated (Premarin) 0.625 MG/GM vaginal cream Insert  into the vagina 2 (Two) Times a Week. Pea sized amount 30 g 12   • hydroCHLOROthiazide (HYDRODIURIL) 25 MG tablet TAKE 1 TABLET EVERY DAY 90 tablet 1   • Insulin Aspart (NovoLOG) 100 UNIT/ML injection Up to 65 units per day per insulin pump 20 mL 5   • levothyroxine (SYNTHROID, LEVOTHROID) 75 MCG tablet TAKE ONE TABLET BY MOUTH DAILY 90 tablet 1   • lisinopril (PRINIVIL,ZESTRIL) 40 MG tablet TAKE 1 TABLET EVERY DAY 90 tablet 1   • pantoprazole (PROTONIX) 40 MG EC tablet Take 1 tablet by mouth Daily. 90 tablet 3   • simvastatin (ZOCOR) 20 MG tablet TAKE 1 TABLET EVERY NIGHT 90 tablet 1   • Sodium Fluoride 5000 Sensitive 1.1-5 % gel Use twice daily       No current facility-administered medications for this visit.     Review of Systems   Constitutional: Negative.    All other systems reviewed and are negative.      OBJECTIVE     Vitals:    02/14/23 0721   BP: 159/41   Pulse: 54   Temp: 97.5 °F (36.4 °C)   SpO2: 100%       Body mass index is 24.69 kg/m².    Lab Results   Component Value Date    HGBA1C 6.3 10/14/2022       Lab Results   Component Value Date    GLUCOSE 160 (H) 12/07/2022    CALCIUM  9.3 12/07/2022     12/07/2022    K 4.5 12/07/2022    CO2 28.0 12/07/2022     12/07/2022    BUN 14 12/07/2022    CREATININE 0.72 12/07/2022    EGFRIFNONA 79 01/10/2022    BCR 19.4 12/07/2022    ANIONGAP 9.0 12/07/2022       Patient seen in no apparent distress.      PHYSICAL EXAM:     Foot/Ankle Exam:       General:   Diabetic Foot Exam Performed    Appearance: appears stated age and healthy    Orientation: AAOx3    Affect: appropriate    Gait: unimpaired    Shoe Gear:  Casual shoes    VASCULAR      Right Foot Vascularity   Normal vascular exam    Dorsalis pedis:  2+  Posterior tibial:  2+  Skin Temperature: warm    Edema Grading:  None  CFT:  < 3 seconds  Pedal Hair Growth:  Absent  Varicosities: mild varicosities       Left Foot Vascularity   Normal vascular exam    Dorsalis pedis:  2+  Posterior tibial:  2+  Skin Temperature: warm    Edema Grading:  None  CFT:  < 3 seconds  Pedal Hair Growth:  Absent  Varicosities: mild varicosities        NEUROLOGIC     Right Foot Neurologic   Normal sensation    Light touch sensation:  Normal  Vibratory sensation:  Normal  Hot/Cold sensation: normal    Protective Sensation using Newell-Donya Monofilament:  10     Left Foot Neurologic   Normal sensation    Light touch sensation:  Normal  Vibratory sensation:  Normal  Hot/cold sensation: normal    Protective Sensation using Newell-Donya Monofilament:  10     MUSCULOSKELETAL      Right Foot Musculoskeletal   Hallux valgus: Yes       Left Foot Musculoskeletal   Hallux valgus: Yes       MUSCLE STRENGTH     Right Foot Muscle Strength   Foot dorsiflexion:  4  Foot plantar flexion:  4  Foot inversion:  4  Foot eversion:  4     Left Foot Muscle Strength   Foot dorsiflexion:  4  Foot plantar flexion:  4  Foot inversion:  4  Foot eversion:  4     RANGE OF MOTION      Right Foot Range of Motion   Foot and ankle ROM within normal limits       Left Foot Range of Motion   Foot and ankle ROM within normal limits        DERMATOLOGIC     Right Foot Dermatologic   Skin: skin intact    Nails: normal       Left Foot Dermatologic   Skin: skin intact    Nails: normal        Diabetic Foot Exam Performed      ASSESSMENT/PLAN     Diagnoses and all orders for this visit:    1. Type 2 diabetes mellitus without complication, with long-term current use of insulin (HCC) (Primary)    2. Diabetic foot ([facility])        Comprehensive lower extremity examination and evaluation was performed.    Discussed findings and treatment plan including risks, benefits, and treatment options with patient in detail. Patient agreed with treatment plan.    Medications and allergies reviewed.  Reviewed available blood glucose and HgB A1C lab values along with other pertinent labs.  These were discussed with the patient as to their importance of diabetic maintenance.    Diabetic foot exam performed and documented this date, compliant with CQM required standards. Detail of findings as noted in physical exam.  Lower extremity Neurologic exam for diabetic patient performed and documented this date, compliant with PQRS required standards. Detail of findings as noted in physical exam.  Advised patient importance of good routine lower extremity hygiene. Advised patient importance of evaluating for intact skin and pain free nail borders.  Advised patient to use mirror to evaluate plantar/ soles of feet for better visualization. Advised patient monitor and phone office to be seen if any cracking to skin, open lesions, painful nail borders or if nails become elongated prior to next visit. Advised patient importance of daily cleansing of lower extremities, followed by good skin cream to maintain normal hydration of skin. Also advised patient importance of close daily monitoring of blood sugar. Advised to regulate diet and medications to maintain control of blood sugar in optimal range. Contact primary care provider if difficulties maintaining blood sugar levels.  Advised Patient  of presence of Diabetes Mellitus condition.  Advised Patient risk of progression and worsening or improvement, then return of condition.  Will monitor condition for any change in future. Treat with most appropriate treatment pending status of condition.  Counseled and advised patient extensively on nature and ramifications of diabetes. Standard instructions given to patient for good diabetic foot care and maintenance. Advised importance of careful monitoring to avoid break down and complications secondary to diabetes. Advised patient importance of strict maintenance of blood sugar control. Advised patient of possible ominous results from neglect of condition, i.e.: amputation/ loss of digits, feet and legs, or even death.  Patient states understands counseling, will monitor closely, continue good hygiene and routine diabetic foot care. Patient will contact office is questions or problems.      An After Visit Summary was printed and given to the patient at discharge, including (if requested) any available informative/educational handouts regarding diagnosis, treatment, or medications. All questions were answered to patient/family satisfaction. Should symptoms fail to improve or worsen they agree to call or return to clinic or to go to the Emergency Department. Discussed the importance of following up with any needed screening tests/labs/specialist appointments and any requested follow-up recommended by me today. Importance of maintaining follow-up discussed and patient accepts that missed appointments can delay diagnosis and potentially lead to worsening of conditions.    Return in about 1 year (around 2/14/2024) for Podiatry Diabetic Foot Exam., or sooner if acute issues arise.    This document has been electronically signed by Tanner Green DPM on February 14, 2023 07:43 EST

## 2023-02-21 RX ORDER — LEVOTHYROXINE SODIUM 0.07 MG/1
TABLET ORAL
Qty: 90 TABLET | Refills: 0 | Status: SHIPPED | OUTPATIENT
Start: 2023-02-21

## 2023-02-21 NOTE — TELEPHONE ENCOUNTER
Rx Refill Note  Requested Prescriptions     Pending Prescriptions Disp Refills   • levothyroxine (SYNTHROID, LEVOTHROID) 75 MCG tablet [Pharmacy Med Name: LEVOTHYROXINE 75 MCG TABLET] 90 tablet 1     Sig: TAKE ONE TABLET BY MOUTH DAILY      Last office visit with prescribing clinician: 11/23/2022     Next office visit with prescribing clinician: 5/23/2023     {Ale Samaniego LPN  02/21/23, 15:10 EST

## 2023-02-22 NOTE — PROGRESS NOTES
Chief Complaint  Diabetes (Follow up, med mgt, a1c eval, pump/cgm eval )    Referred By: No ref. provider found    Subjective          Yohana Casillas presents to [facility] for diabetes medication management    History of Present Illness    Visit type:  follow-up  Diabetes type:  Type 1  Current diabetes status/concerns/issues: She attempted to do a recent software upgrade on her pump but discover that her pump is now outside the warrantee.  She may be eligible for an upgrade to her device now.  Other health concerns: No new health issues  Current Diabetes symptoms:    Polyuria: No   Polydipsia: No   Polyphagia: No   Blurred vision: No   Excessive fatigue: No   Known Diabetes complications:  Neuro: None  Renal: None  Eyes: None  Amputation/Wounds: None  GI: None  Cardiovascular: Hypertension and hyperlipidemia  ED: N/A              Other: None  Hypoglycemia:  Level 1 hypoglycemia (54 mg/dL - 70 mg/dL); Frequency - CGM report indicates 6% of glucose levels are below target and Level 2 (less than 54 mg/dL); Frequency - There is a glucose level as low as 40 mg/dL recorded.  Hypoglycemia Symptoms:  shaking/tremors, sweating and weakness  Current diabetes treatment: Tandem insulin pump using NovoLog insulin.  She is using approximately 30 units of insulin a day  Blood glucose device:  Dexcom CGM  Blood glucose monitoring frequency:  Continuous per CGM  Blood glucose range/average: The 14-day sensor report shows a glucose average of 150 mg/dL with a high of 326 and a low of 40 mg/dL.   68% of glucose levels are followed in target range between 80 and 180 mg/dL while 26% are above target and 6% are below target.  It is noted that she is entering extra carbohydrates after her meal when her glucose levels go too high.  This is causing some of the episodes of hypoglycemia seen in the report today  Glucose Source: Device Reviewed  Diet:  Carbohydrate Counting, Limits high carb/sweet foods, Avoids  sugary drinks  Activity/Exercise:  She is active with her work    Past Medical History:   Diagnosis Date   • Anxiety    • Cancer (HCC)    • Diabetes mellitus type I (HCC)    • GERD (gastroesophageal reflux disease)    • History of thyroplasty 07/2021   • Hyperlipidemia    • Hypertension    • Retinopathy    • Thyroid disease      Past Surgical History:   Procedure Laterality Date   • CARPAL TUNNEL RELEASE     • CHOLECYSTECTOMY     • COLONOSCOPY     • ENDOSCOPY N/A 10/28/2021    Procedure: ESOPHAGOGASTRODUODENOSCOPY with biopsies;  Surgeon: Nadia Sanz MD;  Location: [facility] ENDOSCOPY;  Service: Gastroenterology;  Laterality: N/A;  esophagitis and gastritis   • HYSTERECTOMY     • THYROID SURGERY     • UPPER GASTROINTESTINAL ENDOSCOPY       Family History   Problem Relation Age of Onset   • Diabetes type II Other    • Cancer Mother    • Cancer Father    • Cancer Sister    • Hypertension Brother    • Diabetes Brother    • Malig Hyperthermia Neg Hx      Social History     Socioeconomic History   • Marital status:    • Number of children: 2   Tobacco Use   • Smoking status: Never   • Smokeless tobacco: Never   Vaping Use   • Vaping Use: Never used   Substance and Sexual Activity   • Alcohol use: Never   • Drug use: Never   • Sexual activity: Not Currently     Partners: Male     Birth control/protection: Hysterectomy     Allergies   Allergen Reactions   • Macrobid [Nitrofurantoin] GI Intolerance     Constipation, nausea, headache   • Sulfa Antibiotics Hives     Other reaction(s): rash   • Levofloxacin Rash       Current Outpatient Medications:   •  amLODIPine (NORVASC) 5 MG tablet, TAKE 1 TABLET EVERY DAY, Disp: 90 tablet, Rfl: 1  •  aspirin 81 MG EC tablet, Aspirin Low Dose 81 mg oral tablet,delayed release (DR/EC) take 1 tablet (81 mg) by oral route once daily   Active, Disp: , Rfl:   •  escitalopram (LEXAPRO) 10 MG tablet, TAKE 1 TABLET EVERY DAY, Disp: 90 tablet, Rfl: 1  •  Estrogens Conjugated  "(Premarin) 0.625 MG/GM vaginal cream, Insert  into the vagina 2 (Two) Times a Week. Pea sized amount, Disp: 30 g, Rfl: 12  •  hydroCHLOROthiazide (HYDRODIURIL) 25 MG tablet, TAKE 1 TABLET EVERY DAY, Disp: 90 tablet, Rfl: 1  •  Insulin Aspart (NovoLOG) 100 UNIT/ML injection, Up to 65 units per day per insulin pump, Disp: 20 mL, Rfl: 5  •  levothyroxine (SYNTHROID, LEVOTHROID) 75 MCG tablet, TAKE ONE TABLET BY MOUTH DAILY, Disp: 90 tablet, Rfl: 0  •  lisinopril (PRINIVIL,ZESTRIL) 40 MG tablet, TAKE 1 TABLET EVERY DAY, Disp: 90 tablet, Rfl: 1  •  pantoprazole (PROTONIX) 40 MG EC tablet, Take 1 tablet by mouth Daily., Disp: 90 tablet, Rfl: 3  •  simvastatin (ZOCOR) 20 MG tablet, TAKE 1 TABLET EVERY NIGHT, Disp: 90 tablet, Rfl: 1  •  Sodium Fluoride 5000 Sensitive 1.1-5 % gel, Use twice daily, Disp: , Rfl:     Review of Systems   Constitutional: Negative for activity change, appetite change, fatigue, unexpected weight gain and unexpected weight loss.   Eyes: Negative for blurred vision and visual disturbance.   Gastrointestinal: Negative for abdominal pain, constipation, diarrhea, nausea, vomiting, GERD and indigestion.   Endocrine: Negative for polydipsia, polyphagia and polyuria.   Neurological: Positive for numbness (Hands and feet).        Objective     Vitals:    02/24/23 1357   BP: 115/52   BP Location: Right arm   Patient Position: Sitting   Cuff Size: Pediatric   Pulse: 53   Temp: 98 °F (36.7 °C)   SpO2: 99%   Weight: 62.1 kg (137 lb)   Height: 157.5 cm (62\")   PainSc: 0-No pain     Body mass index is 25.06 kg/m².    Physical Exam  Constitutional:       Appearance: Normal appearance.      Comments: Overweight with BMI of 25.06   HENT:      Head: Normocephalic and atraumatic.      Right Ear: External ear normal.      Left Ear: External ear normal.      Nose: Nose normal.   Eyes:      Extraocular Movements: Extraocular movements intact.      Conjunctiva/sclera: Conjunctivae normal.   Pulmonary:      Effort: " Pulmonary effort is normal.   Musculoskeletal:         General: Normal range of motion.      Cervical back: Normal range of motion.   Skin:     General: Skin is warm and dry.   Neurological:      General: No focal deficit present.      Mental Status: She is alert and oriented to person, place, and time. Mental status is at baseline.   Psychiatric:         Mood and Affect: Mood normal.         Behavior: Behavior normal.         Thought Content: Thought content normal.         Judgment: Judgment normal.         Result Review :   The following data was reviewed by: JACKIE Qureshi on 02/24/2023:    Most Recent A1C    HGBA1C Most Recent 2/24/23   Hemoglobin A1C 6.9 (A)   (A) Abnormal value              A1C Last 3 Results    HGBA1C Last 3 Results 6/24/22 10/14/22 2/24/23   Hemoglobin A1C 6.9 6.3 6.9 (A)   (A) Abnormal value            Point-of-care glucose in the office today is 6.9% indicating controlled type 1 diabetes.  This is up from the prior result of 6.3 collected in October 2022    Creatinine   Date Value Ref Range Status   12/07/2022 0.72 0.57 - 1.00 mg/dL Final   01/10/2022 0.73 0.57 - 1.00 mg/dL Final     eGFR   Date Value Ref Range Status   12/07/2022 90.1 >60.0 mL/min/1.73 Final     Comment:     National Kidney Foundation and American Society of Nephrology (ASN) Task Force recommended calculation based on the Chronic Kidney Disease Epidemiology Collaboration (CKD-EPI) equation refit without adjustment for race.     Labs collected on 12/7/2022 show normal renal function    Total Cholesterol   Date Value Ref Range Status   12/07/2022 183 0 - 200 mg/dL Final   01/10/2022 198 0 - 200 mg/dL Final     Triglycerides   Date Value Ref Range Status   12/07/2022 55 0 - 150 mg/dL Final   01/10/2022 50 0 - 150 mg/dL Final     HDL Cholesterol   Date Value Ref Range Status   12/07/2022 74 (H) 40 - 60 mg/dL Final   01/10/2022 87 (H) 40 - 60 mg/dL Final     LDL Cholesterol    Date Value Ref Range Status    12/07/2022 98 0 - 100 mg/dL Final   01/10/2022 102 (H) 0 - 100 mg/dL Final     Lipid panel collected on 12/7/2022 shows elevated HDL otherwise normal lipid panel            Assessment: The patient's had a slight increase in her A1c but remains in a controlled status.  She is having some postprandial hyperglycemia which she attributes to probable miscounting of her carbohydrate intake.  She is sometimes entering additional carbohydrates after the meal because her glucose level goes too high.  This is causing problematic hypoglycemia to occur.      Diagnoses and all orders for this visit:    1. Controlled diabetes mellitus type 1 without complications (HCC) (Primary)  -     POC Glycosylated Hemoglobin (Hb A1C)    2. Insulin pump in place    3. Uses self-applied continuous glucose monitoring device    4. Overweight (BMI 25.0-29.9)        Plan: We made no changes to her pump settings.  The patient was advised if her glucose level goes high after the meal to take a correction dose but not to add additional carbohydrates as this is a probable cause for her episodes of hypoglycemia.  She will be scheduled for routine follow-up appointment    The patient will monitor her blood glucose levels using her continuous glucose sensor.  If she develops problematic hyperglycemia or hypoglycemia or adverse drug reactions, she will contact the office for further instructions.        Follow Up     No follow-ups on file.    Patient was given instructions and counseling regarding her condition or for health maintenance advice. Please see specific information pulled into the AVS if appropriate.     Ame Key, APRN  02/24/2023      Dictated Utilizing Dragon Dictation.  Please note that portions of this note were completed with a voice recognition program.  Part of this note may be an electronic transcription/translation of spoken language to printed text using the Dragon Dictation System.

## 2023-02-24 ENCOUNTER — OFFICE VISIT (OUTPATIENT)
Dept: DIABETES SERVICES | Facility: CLINIC | Age: 73
End: 2023-02-24
Payer: MEDICARE

## 2023-02-24 VITALS
HEIGHT: 62 IN | OXYGEN SATURATION: 99 % | HEART RATE: 53 BPM | SYSTOLIC BLOOD PRESSURE: 115 MMHG | BODY MASS INDEX: 25.21 KG/M2 | DIASTOLIC BLOOD PRESSURE: 52 MMHG | TEMPERATURE: 98 F | WEIGHT: 137 LBS

## 2023-02-24 DIAGNOSIS — Z97.8 USES SELF-APPLIED CONTINUOUS GLUCOSE MONITORING DEVICE: ICD-10-CM

## 2023-02-24 DIAGNOSIS — E66.3 OVERWEIGHT (BMI 25.0-29.9): ICD-10-CM

## 2023-02-24 DIAGNOSIS — Z96.41 INSULIN PUMP IN PLACE: ICD-10-CM

## 2023-02-24 DIAGNOSIS — E10.9 CONTROLLED DIABETES MELLITUS TYPE 1 WITHOUT COMPLICATIONS: Primary | ICD-10-CM

## 2023-02-24 LAB
EXPIRATION DATE: ABNORMAL
HBA1C MFR BLD: 6.9 %
Lab: ABNORMAL

## 2023-02-24 PROCEDURE — 3044F HG A1C LEVEL LT 7.0%: CPT | Performed by: NURSE PRACTITIONER

## 2023-02-24 PROCEDURE — 99213 OFFICE O/P EST LOW 20 MIN: CPT | Performed by: NURSE PRACTITIONER

## 2023-02-24 PROCEDURE — 95251 CONT GLUC MNTR ANALYSIS I&R: CPT | Performed by: NURSE PRACTITIONER

## 2023-03-16 ENCOUNTER — HOSPITAL ENCOUNTER (OUTPATIENT)
Dept: MAMMOGRAPHY | Facility: HOSPITAL | Age: 73
Discharge: HOME | End: 2023-03-16
Admitting: NURSE PRACTITIONER
Payer: MEDICARE

## 2023-03-16 DIAGNOSIS — Z12.31 SCREENING MAMMOGRAM FOR BREAST CANCER: ICD-10-CM

## 2023-03-16 PROCEDURE — 77067 SCR MAMMO BI INCL CAD: CPT

## 2023-03-16 PROCEDURE — 77063 BREAST TOMOSYNTHESIS BI: CPT

## 2023-05-05 RX ORDER — LEVOTHYROXINE SODIUM 0.07 MG/1
TABLET ORAL
Qty: 90 TABLET | Refills: 0 | Status: SHIPPED | OUTPATIENT
Start: 2023-05-05

## 2023-05-05 NOTE — TELEPHONE ENCOUNTER
Rx Refill Note  Requested Prescriptions     Pending Prescriptions Disp Refills   • levothyroxine (SYNTHROID, LEVOTHROID) 75 MCG tablet [Pharmacy Med Name: LEVOTHYROXINE 75 MCG TABLET] 90 tablet 0     Sig: TAKE ONE TABLET BY MOUTH DAILY      Last office visit with prescribing clinician: 11/23/2022       Next office visit wit prescribing clinician: 5/23/2023       Ale Samaniego LPN  05/05/23, 08:47 EDT

## 2023-05-23 ENCOUNTER — OFFICE VISIT (OUTPATIENT)
Dept: FAMILY MEDICINE CLINIC | Age: 73
End: 2023-05-23
Payer: MEDICARE

## 2023-05-23 VITALS
RESPIRATION RATE: 16 BRPM | HEART RATE: 48 BPM | DIASTOLIC BLOOD PRESSURE: 57 MMHG | WEIGHT: 136 LBS | BODY MASS INDEX: 25.03 KG/M2 | SYSTOLIC BLOOD PRESSURE: 141 MMHG | HEIGHT: 62 IN

## 2023-05-23 DIAGNOSIS — E03.9 ACQUIRED HYPOTHYROIDISM: ICD-10-CM

## 2023-05-23 DIAGNOSIS — F41.9 ANXIETY: ICD-10-CM

## 2023-05-23 DIAGNOSIS — M25.551 RIGHT HIP PAIN: ICD-10-CM

## 2023-05-23 DIAGNOSIS — E78.00 HYPERCHOLESTEROLEMIA: ICD-10-CM

## 2023-05-23 DIAGNOSIS — E10.9 TYPE I DIABETES MELLITUS, WELL CONTROLLED: Primary | ICD-10-CM

## 2023-05-23 NOTE — PROGRESS NOTES
Yohana Casillas presents to [facility].    Chief Complaint:  Diabetes, Hypertension, Hyperlipidemia, Hypothyroidism, and Follow-up         History of Present Illness:  Diabetes:  Clarisse Key   Current medication: novolog  Tolerating medication: Yes  Last eye exam: UTD   Last foot exam: 2-2023  At home BS ranges:     Lab Results       Component                Value               Date                       HGBA1C                   6.9 (A)             02/24/2023              Hyperlipidemia  Current medication: zocor   Tolerating medication: Yes  Needs Refill: no     Lab Results       Component                Value               Date                       CHOL                     183                 12/07/2022                 CHLPL                    180                 12/22/2020                 TRIG                     55                  12/07/2022                 HDL                      74 (H)              12/07/2022                 LDL                      98                  12/07/2022              Hypertension:  Current medication: norvasc,lisinopril and HCTZ   Tolerating Medication: Yes  Checking BP at home and it is: < 130/ < 80  Needs refills: no   Labs:  Lab Results       Component                Value               Date                       GLUCOSE                  160 (H)             12/07/2022                 BUN                      14                  12/07/2022                 CREATININE               0.72                12/07/2022                 EGFRIFNONA               79                  01/10/2022                 BCR                      19.4                12/07/2022                 K                        4.5                 12/07/2022                 CO2                      28.0                12/07/2022                 CALCIUM                  9.3                 12/07/2022                 ALBUMIN                  4.00                12/07/2022                  LABIL2                   1.4                 2021                 AST                      20                  2022                 ALT                      13                  2022              Hypothyroidism  Current rx: levo 75 mcg   Tolerating rx: yes   Refills needed no   Lab Results       Component                Value               Date                       TSH                      0.695               2022              Anxiety/ Depression  Current medication/lexapro   Tolerating medication Yes    PAST MEDICAL HISTORY changes since :     Got her moderna covid booster at work       Hyperlipidemia: Hypercholesterolemia;     Hypertension     Type 1 Diabetes: controlled;     retinopathy, now being monitored, per Dr. Johnny MCKEON +       Hospitalizations: uti and drug reaction , at [facility]         CURRENT MEDICAL PROVIDERS:    Cardiologist: Reese    Urologist: dr sara Key NP/CDE         PREVENTIVE HEALTH MAINTENANCE             COLORECTAL CANCER SCREENING: colonoscopy with normal results     Hepatitis C Medicare Screening: was last done        PAP SMEAR: hysterectomy         Surgical History:       EGD     Hysterectomy: Partial;     thyroidplasty 11     Thyroidectomy: small portion remains;     Bilateral Tubal Ligation    Bilateral Carpal Tunnel;    right index finger, trigger release and right wrist cyst removed 11; Procedures: colonoscopy      Cataract Removal: bilateral; &10- 2016;     Cholecystectomy: laparoscopic; 19;     Procedures:    Colonoscopy ( 14 )    EGD ( 14 ) left vocal cord surgery 19         Family History:         Positive for Hypertension ( brother ).      Positive for Lung Cancer ( father; mother ).      Positive for Seizure Disorder ( brother ).  Father:  at age 63; Cause of death was lung cancer     Mother:  at age 69; Cause of death was adrenal  cancer;  Lung Cancer         Social History:     Occupation: [employer] dept 3-4 days a week     Marital Status:      Children: 2 children            Review of Systems:  Review of Systems   Constitutional: Negative for fatigue and fever.   Respiratory: Negative for cough and shortness of breath.    Cardiovascular: Negative for chest pain, palpitations and leg swelling.   Musculoskeletal: Positive for arthralgias (right hip pain, after doing an exercise, comes and goes and worse with sitting for long periods, tried tylenol, helps some ).   Neurological: Negative for numbness.          Current Outpatient Medications:   •  amLODIPine (NORVASC) 5 MG tablet, TAKE 1 TABLET EVERY DAY, Disp: 90 tablet, Rfl: 1  •  aspirin 81 MG EC tablet, Aspirin Low Dose 81 mg oral tablet,delayed release (DR/EC) take 1 tablet (81 mg) by oral route once daily   Active, Disp: , Rfl:   •  escitalopram (LEXAPRO) 10 MG tablet, TAKE 1 TABLET EVERY DAY, Disp: 90 tablet, Rfl: 1  •  Estrogens Conjugated (Premarin) 0.625 MG/GM vaginal cream, Insert  into the vagina 2 (Two) Times a Week. Pea sized amount, Disp: 30 g, Rfl: 12  •  hydroCHLOROthiazide (HYDRODIURIL) 25 MG tablet, TAKE 1 TABLET EVERY DAY, Disp: 90 tablet, Rfl: 1  •  Insulin Aspart (NovoLOG) 100 UNIT/ML injection, Up to 65 units per day per insulin pump, Disp: 20 mL, Rfl: 5  •  levothyroxine (SYNTHROID, LEVOTHROID) 75 MCG tablet, TAKE ONE TABLET BY MOUTH DAILY, Disp: 90 tablet, Rfl: 0  •  lisinopril (PRINIVIL,ZESTRIL) 40 MG tablet, TAKE 1 TABLET EVERY DAY, Disp: 90 tablet, Rfl: 1  •  pantoprazole (PROTONIX) 40 MG EC tablet, Take 1 tablet by mouth Daily., Disp: 90 tablet, Rfl: 3  •  simvastatin (ZOCOR) 20 MG tablet, TAKE 1 TABLET EVERY NIGHT, Disp: 90 tablet, Rfl: 1  •  Sodium Fluoride 5000 Sensitive 1.1-5 % gel, Use twice daily, Disp: , Rfl:     Vital Signs:   Vitals:    05/23/23 1323   BP: 141/57   BP Location: Left arm   Patient Position: Sitting   Cuff Size: Adult   Pulse:  "(!) 48   Resp: 16   Weight: 61.7 kg (136 lb)   Height: 157.5 cm (62\")         Physical Exam:  Physical Exam  Vitals reviewed.   Constitutional:       General: She is not in acute distress.     Appearance: Normal appearance.   Neck:      Vascular: No carotid bruit.   Cardiovascular:      Rate and Rhythm: Normal rate and regular rhythm.      Heart sounds: Normal heart sounds. No murmur heard.  Pulmonary:      Effort: Pulmonary effort is normal. No respiratory distress.      Breath sounds: Normal breath sounds.   Musculoskeletal:         General: No tenderness (to right hip, full ROM Of hips and back, no pain ).      Right lower leg: No edema.      Left lower leg: No edema.   Neurological:      Mental Status: She is alert.   Psychiatric:         Mood and Affect: Mood normal.         Behavior: Behavior normal.         Result Review      The following data was reviewed by: JACKIE Rosen on 05/23/2023:    Results for orders placed or performed in visit on 02/24/23   POC Glycosylated Hemoglobin (Hb A1C)    Specimen: Blood   Result Value Ref Range    Hemoglobin A1C 6.9 (A) %    Lot Number [id number]     Expiration Date 12.31.2024                Assessment and Plan:          Diagnoses and all orders for this visit:    1. Type I diabetes mellitus, well controlled (Primary)  Assessment & Plan:  She has a follow up appt with MARLON Key, will return fasting for labs     Orders:  -     Comprehensive Metabolic Panel; Future  -     Lipid Panel; Future  -     Hemoglobin A1c; Future  -     Microalbumin / Creatinine Urine Ratio - Urine, Clean Catch; Future    2. Right hip pain  Assessment & Plan:  Will x-ray her right hip, consider further evaluation or referral     Orders:  -     XR Hip With or Without Pelvis 2 - 3 View Right; Future    3. Acquired hypothyroidism  Assessment & Plan:  Will recheck her TSH with fasting labs     Orders:  -     TSH Rfx On Abnormal To Free T4; Future    4. Hypercholesterolemia  Assessment & " Plan:  Continue current rx       5. Anxiety  Assessment & Plan:  Continue current rx           Follow Up   Return for fasting for labs.  Patient was given instructions and counseling regarding her condition or for health maintenance advice. Please see specific information pulled into the AVS if appropriate.

## 2023-05-30 ENCOUNTER — LAB (OUTPATIENT)
Dept: LAB | Facility: HOSPITAL | Age: 73
End: 2023-05-30

## 2023-05-30 ENCOUNTER — HOSPITAL ENCOUNTER (OUTPATIENT)
Dept: GENERAL RADIOLOGY | Facility: HOSPITAL | Age: 73
Discharge: HOME | End: 2023-05-30

## 2023-05-30 DIAGNOSIS — M25.551 RIGHT HIP PAIN: ICD-10-CM

## 2023-05-30 DIAGNOSIS — E03.9 ACQUIRED HYPOTHYROIDISM: ICD-10-CM

## 2023-05-30 DIAGNOSIS — E10.9 TYPE I DIABETES MELLITUS, WELL CONTROLLED: ICD-10-CM

## 2023-05-30 LAB
ALBUMIN SERPL-MCNC: 4.2 G/DL (ref 3.5–5.2)
ALBUMIN UR-MCNC: <1.2 MG/DL
ALBUMIN/GLOB SERPL: 1.6 G/DL
ALP SERPL-CCNC: 71 U/L (ref 39–117)
ALT SERPL W P-5'-P-CCNC: 14 U/L (ref 1–33)
ANION GAP SERPL CALCULATED.3IONS-SCNC: 6.5 MMOL/L (ref 5–15)
AST SERPL-CCNC: 18 U/L (ref 1–32)
BILIRUB SERPL-MCNC: 0.5 MG/DL (ref 0–1.2)
BUN SERPL-MCNC: 17 MG/DL (ref 8–23)
BUN/CREAT SERPL: 21.8 (ref 7–25)
CALCIUM SPEC-SCNC: 9.6 MG/DL (ref 8.6–10.5)
CHLORIDE SERPL-SCNC: 103 MMOL/L (ref 98–107)
CHOLEST SERPL-MCNC: 245 MG/DL (ref 0–200)
CO2 SERPL-SCNC: 29.5 MMOL/L (ref 22–29)
CREAT SERPL-MCNC: 0.78 MG/DL (ref 0.57–1)
CREAT UR-MCNC: 111.3 MG/DL
EGFRCR SERPLBLD CKD-EPI 2021: 81.8 ML/MIN/1.73
GLOBULIN UR ELPH-MCNC: 2.7 GM/DL
GLUCOSE SERPL-MCNC: 115 MG/DL (ref 65–99)
HBA1C MFR BLD: 7.1 % (ref 4.8–5.6)
HDLC SERPL-MCNC: 75 MG/DL (ref 40–60)
LDLC SERPL CALC-MCNC: 161 MG/DL (ref 0–100)
LDLC/HDLC SERPL: 2.11 {RATIO}
MICROALBUMIN/CREAT UR: NORMAL MG/G{CREAT}
POTASSIUM SERPL-SCNC: 4.1 MMOL/L (ref 3.5–5.2)
PROT SERPL-MCNC: 6.9 G/DL (ref 6–8.5)
SODIUM SERPL-SCNC: 139 MMOL/L (ref 136–145)
TRIGL SERPL-MCNC: 58 MG/DL (ref 0–150)
TSH SERPL DL<=0.05 MIU/L-ACNC: 1.22 UIU/ML (ref 0.27–4.2)
VLDLC SERPL-MCNC: 9 MG/DL (ref 5–40)

## 2023-05-30 PROCEDURE — 82570 ASSAY OF URINE CREATININE: CPT

## 2023-05-30 PROCEDURE — 82043 UR ALBUMIN QUANTITATIVE: CPT

## 2023-05-30 PROCEDURE — 83036 HEMOGLOBIN GLYCOSYLATED A1C: CPT

## 2023-05-30 PROCEDURE — 80053 COMPREHEN METABOLIC PANEL: CPT

## 2023-05-30 PROCEDURE — 73502 X-RAY EXAM HIP UNI 2-3 VIEWS: CPT

## 2023-05-30 PROCEDURE — 36415 COLL VENOUS BLD VENIPUNCTURE: CPT

## 2023-05-30 PROCEDURE — 80061 LIPID PANEL: CPT

## 2023-05-30 PROCEDURE — 84443 ASSAY THYROID STIM HORMONE: CPT

## 2023-06-01 ENCOUNTER — TELEPHONE (OUTPATIENT)
Dept: FAMILY MEDICINE CLINIC | Age: 73
End: 2023-06-01

## 2023-06-01 NOTE — TELEPHONE ENCOUNTER
Faxed to Dr Canales.  If they need the film they will need to request that from [facility].

## 2023-06-01 NOTE — TELEPHONE ENCOUNTER
Caller: Yohana Casillas    Relationship: Self    Best call back number: N/A    What is the best time to reach you: N/A    Who are you requesting to speak with (clinical staff, provider,  specific staff member): [facility] NURSE [facility]    Do you know the name of the person who called: PATIENT    What was the call regarding: FAX NUMBER [phone] DR SANTIZO. SHAGUFTA NEEDED THIS INFORMATION. NO NEED TO CALL PATIENT BACK.    Is it okay if the provider responds through MyChart:N/A

## 2023-06-07 ENCOUNTER — OFFICE VISIT (OUTPATIENT)
Dept: ORTHOPEDIC SURGERY | Facility: CLINIC | Age: 73
End: 2023-06-07
Payer: MEDICARE

## 2023-06-07 VITALS
SYSTOLIC BLOOD PRESSURE: 138 MMHG | OXYGEN SATURATION: 95 % | WEIGHT: 136.2 LBS | DIASTOLIC BLOOD PRESSURE: 61 MMHG | BODY MASS INDEX: 25.06 KG/M2 | HEART RATE: 53 BPM | HEIGHT: 62 IN

## 2023-06-07 DIAGNOSIS — M76.891 HAMSTRING TENDONITIS OF RIGHT THIGH: ICD-10-CM

## 2023-06-07 DIAGNOSIS — M70.61 TROCHANTERIC BURSITIS OF RIGHT HIP: Primary | ICD-10-CM

## 2023-06-07 RX ORDER — DEXAMETHASONE SODIUM PHOSPHATE 4 MG/ML
8 INJECTION, SOLUTION INTRA-ARTICULAR; INTRALESIONAL; INTRAMUSCULAR; INTRAVENOUS; SOFT TISSUE ONCE
Status: COMPLETED | OUTPATIENT
Start: 2023-06-07 | End: 2023-06-07

## 2023-06-07 RX ADMIN — DEXAMETHASONE SODIUM PHOSPHATE 8 MG: 4 INJECTION, SOLUTION INTRA-ARTICULAR; INTRALESIONAL; INTRAMUSCULAR; INTRAVENOUS; SOFT TISSUE at 15:20

## 2023-06-07 NOTE — PROGRESS NOTES
"Chief Complaint  Initial Evaluation of the Right Hip and Initial Evaluation of the Left Hip     Subjective      Yohana Casillas presents to [facility] for initial evaluation of bilateral hips.  She had an X ray from her PCP.  She is here to discuss the X rays.  She has had pain in the gluteal region and the lateral aspect of the hip.  She has difficulty with prolonged standing, sitting and stairs.  She has had no recent injury.       Allergies   Allergen Reactions    Macrobid [Nitrofurantoin] GI Intolerance     Constipation, nausea, headache    Sulfa Antibiotics Hives     Other reaction(s): rash    Levofloxacin Rash        Social History     Socioeconomic History    Marital status:     Number of children: 2   Tobacco Use    Smoking status: Never    Smokeless tobacco: Never   Vaping Use    Vaping Use: Never used   Substance and Sexual Activity    Alcohol use: Never    Drug use: Never    Sexual activity: Not Currently     Partners: Male     Birth control/protection: Hysterectomy        Review of Systems     Objective   Vital Signs:   /61 (BP Location: Right arm, Patient Position: Sitting, Cuff Size: Adult)   Pulse 53   Ht 157.5 cm (62\")   Wt 61.8 kg (136 lb 3.2 oz)   SpO2 95%   BMI 24.91 kg/m²       Physical Exam  Constitutional:       Appearance: Normal appearance. Patient is well-developed and normal weight.   HENT:      Head: Normocephalic.      Right Ear: Hearing and external ear normal.      Left Ear: Hearing and external ear normal.      Nose: Nose normal.   Eyes:      Conjunctiva/sclera: Conjunctivae normal.   Cardiovascular:      Rate and Rhythm: Normal rate.   Pulmonary:      Effort: Pulmonary effort is normal.      Breath sounds: No wheezing or rales.   Abdominal:      Palpations: Abdomen is soft.      Tenderness: There is no abdominal tenderness.   Musculoskeletal:      Cervical back: Normal range of motion.   Skin:     Findings: No rash.   Neurological:      " Mental Status: Patient  is alert and oriented to person, place, and time.   Psychiatric:         Mood and Affect: Mood and affect normal.         Judgment: Judgment normal.       Ortho Exam      BILATERAL HIPS No skin discoloration, atrophy or swelling. Positive EHL, FHL, GS, and TA. Sensation intact to all 5 nerves of the foot. Positive straight leg raise. Leg lengths appear equal. Ambulates with Antalgic gait Negative Veronika. Negative Branden. Good strength to hamstrings, quadriceps, dorsiflexors, and plantar flexors. Knee Extensor Mechanism  intact       Procedures        Imaging Results (Most Recent)       None             Result Review :         XR Hip With or Without Pelvis 2 - 3 View Right    Result Date: 5/30/2023  Narrative: PROCEDURE: XR HIP W OR WO PELVIS 2-3 VIEW RIGHT  COMPARISON: [facility], CR, RIGHT HIP/PELVIS, 5/18/2016, 15:44.  INDICATIONS: CHRONIC RIGHT POSTERIOR HIP PAIN  FINDINGS:  No fracture or malalignment is identified.  No focal osseous lesion is seen.  Mild bilateral hip osteoarthritis is noted.  Mild degenerative disc changes are noted in the lower lumbar spine.  There are foci of abnormal mineralization noted along the femoral greater trochanters bilaterally.      Impression:   1. Mild bilateral hip osteoarthritis 2. Mild degenerative disc changes in the lower lumbar spine 3. Abnormal mineralization near the femoral greater trochanters bilaterally suggesting gluteal calcific tendinitis      Kyler Salinas M.D.       Electronically Signed and Approved By: Kyler Salinas M.D. on 5/30/2023 at 8:34                     Assessment and Plan     Diagnoses and all orders for this visit:    1. Hamstring tendonitis of right thigh (Primary)    2. Trochanteric bursitis of right hip        Discussed the treatment plan with the patient. I reviewed the X-rays that were obtained 5/30/23 with the patient.     Discussed the risks and benefits of conservative measures.  The patient expressed  understanding and wished to proceed with a left hip IM injection.      Add hamstring and trochanteric bursitis to her physical therapy order.      Call or return if worsening symptoms.    Follow Up     PRN      Patient was given instructions and counseling regarding her condition or for health maintenance advice. Please see specific information pulled into the AVS if appropriate.     Scribed for Qasim Meléndez MD by Trista Lanza MA.  06/07/23   14:59 EDT    I have personally performed the services described in this document as scribed by the above individual and it is both accurate and complete. Qasim Meléndez MD 06/07/23

## 2023-06-16 DIAGNOSIS — E78.2 MIXED HYPERLIPIDEMIA: ICD-10-CM

## 2023-06-16 DIAGNOSIS — F41.9 ANXIETY: ICD-10-CM

## 2023-06-19 RX ORDER — LISINOPRIL 40 MG/1
TABLET ORAL
Qty: 90 TABLET | Refills: 1 | Status: SHIPPED | OUTPATIENT
Start: 2023-06-19

## 2023-06-19 RX ORDER — HYDROCHLOROTHIAZIDE 25 MG/1
TABLET ORAL
Qty: 90 TABLET | Refills: 1 | Status: SHIPPED | OUTPATIENT
Start: 2023-06-19

## 2023-06-19 RX ORDER — SIMVASTATIN 20 MG
TABLET ORAL
Qty: 90 TABLET | Refills: 1 | Status: SHIPPED | OUTPATIENT
Start: 2023-06-19

## 2023-06-19 RX ORDER — ESCITALOPRAM OXALATE 10 MG/1
TABLET ORAL
Qty: 90 TABLET | Refills: 1 | Status: SHIPPED | OUTPATIENT
Start: 2023-06-19

## 2023-06-19 RX ORDER — AMLODIPINE BESYLATE 5 MG/1
TABLET ORAL
Qty: 90 TABLET | Refills: 1 | Status: SHIPPED | OUTPATIENT
Start: 2023-06-19

## 2023-08-01 ENCOUNTER — OFFICE VISIT (OUTPATIENT)
Dept: CARDIOLOGY | Facility: CLINIC | Age: 73
End: 2023-08-01
Payer: MEDICARE

## 2023-08-01 VITALS
WEIGHT: 134.2 LBS | DIASTOLIC BLOOD PRESSURE: 94 MMHG | HEIGHT: 62 IN | SYSTOLIC BLOOD PRESSURE: 154 MMHG | HEART RATE: 64 BPM | BODY MASS INDEX: 24.69 KG/M2

## 2023-08-01 DIAGNOSIS — E78.2 HYPERLIPEMIA, MIXED: ICD-10-CM

## 2023-08-01 DIAGNOSIS — R06.02 SHORTNESS OF BREATH: ICD-10-CM

## 2023-08-01 DIAGNOSIS — I34.0 NONRHEUMATIC MITRAL VALVE REGURGITATION: ICD-10-CM

## 2023-08-01 DIAGNOSIS — I10 HYPERTENSION, ESSENTIAL: Primary | ICD-10-CM

## 2023-08-01 PROCEDURE — 99214 OFFICE O/P EST MOD 30 MIN: CPT | Performed by: SPECIALIST

## 2023-08-01 PROCEDURE — 1159F MED LIST DOCD IN RCRD: CPT | Performed by: SPECIALIST

## 2023-08-01 PROCEDURE — 3077F SYST BP >= 140 MM HG: CPT | Performed by: SPECIALIST

## 2023-08-01 PROCEDURE — 1160F RVW MEDS BY RX/DR IN RCRD: CPT | Performed by: SPECIALIST

## 2023-08-01 PROCEDURE — 3080F DIAST BP >= 90 MM HG: CPT | Performed by: SPECIALIST

## 2023-08-01 RX ORDER — ATORVASTATIN CALCIUM 20 MG/1
20 TABLET, FILM COATED ORAL DAILY
Qty: 90 TABLET | Refills: 3 | Status: SHIPPED | OUTPATIENT
Start: 2023-08-01

## 2023-08-01 RX ORDER — AMLODIPINE BESYLATE 10 MG/1
10 TABLET ORAL DAILY
Qty: 90 TABLET | Refills: 3 | Status: SHIPPED | OUTPATIENT
Start: 2023-08-01

## 2023-08-04 RX ORDER — LEVOTHYROXINE SODIUM 0.07 MG/1
TABLET ORAL
Qty: 90 TABLET | Refills: 0 | Status: SHIPPED | OUTPATIENT
Start: 2023-08-04

## 2023-08-21 ENCOUNTER — TELEPHONE (OUTPATIENT)
Dept: CARDIOLOGY | Facility: CLINIC | Age: 73
End: 2023-08-21
Payer: MEDICARE

## 2023-08-21 NOTE — TELEPHONE ENCOUNTER
Received call from patient. Per patient since increasing amlodipine she has been having more swelling.   Patient on   amlodipine 10 mg daily  HCTZ 25 mg daily  Lisinopril 40 mg daily

## 2023-08-22 RX ORDER — CARVEDILOL 6.25 MG/1
6.25 TABLET ORAL 2 TIMES DAILY
Qty: 180 TABLET | Refills: 3 | Status: SHIPPED | OUTPATIENT
Start: 2023-08-22

## 2023-08-25 ENCOUNTER — OFFICE VISIT (OUTPATIENT)
Dept: DIABETES SERVICES | Facility: CLINIC | Age: 73
End: 2023-08-25
Payer: MEDICARE

## 2023-08-25 VITALS
DIASTOLIC BLOOD PRESSURE: 64 MMHG | OXYGEN SATURATION: 96 % | BODY MASS INDEX: 25.21 KG/M2 | SYSTOLIC BLOOD PRESSURE: 110 MMHG | HEART RATE: 44 BPM | HEIGHT: 62 IN | WEIGHT: 137 LBS

## 2023-08-25 DIAGNOSIS — E10.9 CONTROLLED DIABETES MELLITUS TYPE 1 WITHOUT COMPLICATIONS: Primary | ICD-10-CM

## 2023-08-25 DIAGNOSIS — Z46.81 INSULIN PUMP TITRATION: ICD-10-CM

## 2023-08-25 DIAGNOSIS — Z96.41 INSULIN PUMP IN PLACE: ICD-10-CM

## 2023-08-25 DIAGNOSIS — E66.3 OVERWEIGHT (BMI 25.0-29.9): ICD-10-CM

## 2023-08-25 DIAGNOSIS — Z97.8 USES SELF-APPLIED CONTINUOUS GLUCOSE MONITORING DEVICE: ICD-10-CM

## 2023-08-25 LAB
EXPIRATION DATE: ABNORMAL
HBA1C MFR BLD: 7 %
Lab: ABNORMAL

## 2023-08-25 PROCEDURE — 3074F SYST BP LT 130 MM HG: CPT | Performed by: NURSE PRACTITIONER

## 2023-08-25 PROCEDURE — 3051F HG A1C>EQUAL 7.0%<8.0%: CPT | Performed by: NURSE PRACTITIONER

## 2023-08-25 PROCEDURE — 3078F DIAST BP <80 MM HG: CPT | Performed by: NURSE PRACTITIONER

## 2023-08-25 PROCEDURE — 1160F RVW MEDS BY RX/DR IN RCRD: CPT | Performed by: NURSE PRACTITIONER

## 2023-08-25 PROCEDURE — 1159F MED LIST DOCD IN RCRD: CPT | Performed by: NURSE PRACTITIONER

## 2023-08-25 PROCEDURE — 95251 CONT GLUC MNTR ANALYSIS I&R: CPT | Performed by: NURSE PRACTITIONER

## 2023-08-25 PROCEDURE — 99214 OFFICE O/P EST MOD 30 MIN: CPT | Performed by: NURSE PRACTITIONER

## 2023-08-25 NOTE — PROGRESS NOTES
Chief Complaint  Diabetes (Follow up, med mgt, A1c eval, pump eval )    Referred By: No ref. provider found    Subjective          Yohana Casillas presents to 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  Current Diabetes symptoms:    Polyuria: No   Polydipsia: No   Polyphagia: No   Blurred vision: No   Excessive fatigue: No  Known Diabetes complications:  Neuropathy: None; Location: N/A  Renal: Stage II mild (GFR = 60-89 mL/min)  Eyes: None; Location: N/A  Amputation/Wounds: None  GI: None  Cardiovascular: Hypertension and Hyperlipidemia  ED: N/A  Other: None  Hypoglycemia:  Level 1 hypoglycemia (54 mg/dL - 70 mg/dL); Frequency - 4% per CGM and Level 2 (less than 54 mg/dL); Frequency - CGM does indicate a glucose level as low as 40 mg/dL  Hypoglycemia Symptoms:  shaking/tremors  Current diabetes treatment:  Tandem insulin pump using NovoLog insulin. She is using approximately 30 units of insulin a day   Blood glucose device:  Dexcom CGM  Blood glucose monitoring frequency:  Continuous per CGM  Blood glucose range/average:   The 14-day pump report shows glucose average of 163 mg/dL with 64% of glucose levels while in target range between 80 and 180 mg/dL with 31% are above target 4% are below target.  She is having episodes of hypoglycemia during the midday  Glucose Source: Device Reviewed  Diet:  Carbohydrate Counting, Limits high carb/sweet foods, Avoids sugary drinks  Activity/Exercise:   She is very active with work    Past Medical History:   Diagnosis Date    Anxiety     Cancer     Diabetes mellitus type I     GERD  (gastroesophageal reflux disease)     History of thyroplasty 07/2021    Hyperlipidemia     Hypertension     Retinopathy     Thyroid disease      Past Surgical History:   Procedure Laterality Date    CARPAL TUNNEL RELEASE      CHOLECYSTECTOMY      COLONOSCOPY      ENDOSCOPY N/A 10/28/2021    Procedure: ESOPHAGOGASTRODUODENOSCOPY with biopsies;  Surgeon: Nadia Sanz MD;  Location: [facility];  Service: Gastroenterology;  Laterality: N/A;  esophagitis and gastritis    HYSTERECTOMY      THYROID SURGERY      UPPER GASTROINTESTINAL ENDOSCOPY       Family History   Problem Relation Age of Onset    Diabetes type II Other     Cancer Mother     Cancer Father     Cancer Sister     Hypertension Brother     Diabetes Brother     Malig Hyperthermia Neg Hx      Social History     Socioeconomic History    Marital status:     Number of children: 2   Tobacco Use    Smoking status: Never    Smokeless tobacco: Never   Vaping Use    Vaping Use: Never used   Substance and Sexual Activity    Alcohol use: Never    Drug use: Never    Sexual activity: Not Currently     Partners: Male     Birth control/protection: Hysterectomy     Allergies   Allergen Reactions    Macrobid [Nitrofurantoin] GI Intolerance     Constipation, nausea, headache    Sulfa Antibiotics Hives     Other reaction(s): rash    Levofloxacin Rash       Current Outpatient Medications:     aspirin 81 MG EC tablet, Aspirin Low Dose 81 mg oral tablet,delayed release (DR/EC) take 1 tablet (81 mg) by oral route once daily   Active, Disp: , Rfl:     atorvastatin (LIPITOR) 20 MG tablet, Take 1 tablet by mouth Daily., Disp: 90 tablet, Rfl: 3    carvedilol (COREG) 6.25 MG tablet, Take 1 tablet by mouth 2 (Two) Times a Day., Disp: 180 tablet, Rfl: 3    escitalopram (LEXAPRO) 10 MG tablet, TAKE 1 TABLET EVERY DAY, Disp: 90 tablet, Rfl: 1    Estrogens Conjugated (Premarin) 0.625 MG/GM vaginal cream, Insert  into the vagina 2 (Two) Times a Week. Pea sized amount,  "Disp: 30 g, Rfl: 12    hydroCHLOROthiazide (HYDRODIURIL) 25 MG tablet, TAKE 1 TABLET EVERY DAY, Disp: 90 tablet, Rfl: 1    Insulin Aspart (novoLOG) 100 UNIT/ML injection, INJECT UP TO 65 UNITS VIA INSULIN PUMP DAILY, Disp: 20 mL, Rfl: 5    levothyroxine (SYNTHROID, LEVOTHROID) 75 MCG tablet, TAKE ONE TABLET BY MOUTH DAILY, Disp: 90 tablet, Rfl: 0    lisinopril (PRINIVIL,ZESTRIL) 40 MG tablet, TAKE 1 TABLET EVERY DAY, Disp: 90 tablet, Rfl: 1    pantoprazole (PROTONIX) 40 MG EC tablet, Take 1 tablet by mouth Daily., Disp: 90 tablet, Rfl: 3    Sodium Fluoride 5000 Sensitive 1.1-5 % gel, Use twice daily, Disp: , Rfl:     Objective     Vitals:    08/25/23 1324   BP: 110/64   BP Location: Right arm   Patient Position: Sitting   Cuff Size: Adult   Pulse: (!) 44   SpO2: 96%   Weight: 62.1 kg (137 lb)   Height: 157.5 cm (62\")   PainSc: 0-No pain     Body mass index is 25.06 kg/mý.    Physical Exam  Constitutional:       Appearance: Normal appearance.      Comments: Overweight (BMI 25 - 29.9) Pt Current BMI = 25.06    HENT:      Head: Normocephalic and atraumatic.      Right Ear: External ear normal.      Left Ear: External ear normal.      Nose: Nose normal.   Eyes:      Extraocular Movements: Extraocular movements intact.      Conjunctiva/sclera: Conjunctivae normal.   Pulmonary:      Effort: Pulmonary effort is normal.   Musculoskeletal:         General: Normal range of motion.      Cervical back: Normal range of motion.   Skin:     General: Skin is warm and dry.   Neurological:      General: No focal deficit present.      Mental Status: She is alert and oriented to person, place, and time. Mental status is at baseline.   Psychiatric:         Mood and Affect: Mood normal.         Behavior: Behavior normal.         Thought Content: Thought content normal.         Judgment: Judgment normal.           Result Review :   The following data was reviewed by: JACKIE Qureshi on 08/25/2023:    Most Recent A1C          " 8/25/2023    13:25   HGBA1C Most Recent   Hemoglobin A1C 7        A1C Last 3 Results          2/24/2023    14:02 5/30/2023    07:05 8/25/2023    13:25   HGBA1C Last 3 Results   Hemoglobin A1C 6.9  7.10  7      A1c collected in the office today is 7%, indicating Controlled Type I diabetes.  This result is down from the prior result of 7.1% collected on 5/30/2023      Creatinine   Date Value Ref Range Status   05/30/2023 0.78 0.57 - 1.00 mg/dL Final   12/07/2022 0.72 0.57 - 1.00 mg/dL Final     eGFR   Date Value Ref Range Status   05/30/2023 81.8 >60.0 mL/min/1.73 Final   12/07/2022 90.1 >60.0 mL/min/1.73 Final     Comment:     National Kidney Foundation and American Society of Nephrology (ASN) Task Force recommended calculation based on the Chronic Kidney Disease Epidemiology Collaboration (CKD-EPI) equation refit without adjustment for race.     Labs collected on 5/30/2023 show Stage II mild (GFR = 60-89mL/min)    Microalbumin, Urine   Date Value Ref Range Status   05/30/2023 <1.2 mg/dL Final     Creatinine, Urine   Date Value Ref Range Status   05/30/2023 111.3 mg/dL Final     Urine microalbuminuria collected on 5/30/2023 is negative for microalbuminuria    Total Cholesterol   Date Value Ref Range Status   05/30/2023 245 (H) 0 - 200 mg/dL Final   12/07/2022 183 0 - 200 mg/dL Final     Triglycerides   Date Value Ref Range Status   05/30/2023 58 0 - 150 mg/dL Final   12/07/2022 55 0 - 150 mg/dL Final     HDL Cholesterol   Date Value Ref Range Status   05/30/2023 75 (H) 40 - 60 mg/dL Final   12/07/2022 74 (H) 40 - 60 mg/dL Final     LDL Cholesterol    Date Value Ref Range Status   05/30/2023 161 (H) 0 - 100 mg/dL Final   12/07/2022 98 0 - 100 mg/dL Final     Lipid panel collected on 5/30/2023 shows Hypercholesterolemia            Assessment: She has had improvement in her A1c and is back in a controlled status.  She is having some episodes of hypoglycemia during the midday.  She states these are occurring sometimes at  work.      Diagnoses and all orders for this visit:    1. Controlled diabetes mellitus type 1 without complications (Primary)  -     POC Glycosylated Hemoglobin (Hb A1C)    2. Insulin pump in place    3. Insulin pump titration    4. Uses self-applied continuous glucose monitoring device    5. Overweight (BMI 25.0-29.9)        Plan: To minimize the risk for hypoglycemia the 11 AM to 3 PM carbohydrate ratio 1:18 was increased to 1-20 to prevent postprandial hypoglycemia.  No other changes were made to the pump settings today.  The patient be scheduled for routine follow-up appointment.    The patient will monitor her blood glucose levels using her CGM.  If she develops problematic hyperglycemia or hypoglycemia or adverse drug reactions, she will contact the office for further instructions.        Follow Up     No follow-ups on file.    Patient was given instructions and counseling regarding her condition or for health maintenance advice. Please see specific information pulled into the AVS if appropriate.     Ame Key, JACKIE  08/25/2023      Dictated Utilizing Dragon Dictation.  Please note that portions of this note were completed with a voice recognition program.  Part of this note may be an electronic transcription/translation of spoken language to printed text using the Dragon Dictation System.

## 2023-10-05 ENCOUNTER — OFFICE VISIT (OUTPATIENT)
Dept: GASTROENTEROLOGY | Facility: CLINIC | Age: 73
End: 2023-10-05
Payer: MEDICARE

## 2023-10-05 VITALS
SYSTOLIC BLOOD PRESSURE: 120 MMHG | BODY MASS INDEX: 24.84 KG/M2 | WEIGHT: 135 LBS | DIASTOLIC BLOOD PRESSURE: 60 MMHG | HEIGHT: 62 IN | HEART RATE: 52 BPM

## 2023-10-05 DIAGNOSIS — K20.90 ESOPHAGITIS: ICD-10-CM

## 2023-10-05 DIAGNOSIS — K21.9 GASTROESOPHAGEAL REFLUX DISEASE, UNSPECIFIED WHETHER ESOPHAGITIS PRESENT: Primary | ICD-10-CM

## 2023-10-05 RX ORDER — PANTOPRAZOLE SODIUM 40 MG/1
40 TABLET, DELAYED RELEASE ORAL DAILY
Qty: 90 TABLET | Refills: 3 | Status: SHIPPED | OUTPATIENT
Start: 2023-10-05

## 2023-10-05 NOTE — PATIENT INSTRUCTIONS
Food Choices for Gastroesophageal Reflux Disease, Adult  When you have gastroesophageal reflux disease (GERD), the foods you eat and your eating habits are very important. Choosing the right foods can help ease your discomfort. Think about working with a food expert (dietitian) to help you make good choices.  What are tips for following this plan?  Reading food labels  Look for foods that are low in saturated fat. Foods that may help with your symptoms include:  Foods that have less than 5% of daily value (DV) of fat.  Foods that have 0 grams of trans fat.  Cooking  Do not pettit your food.  Cook your food by baking, steaming, grilling, or broiling. These are all methods that do not need a lot of fat for cooking.  To add flavor, try to use herbs that are low in spice and acidity.  Meal planning    Choose healthy foods that are low in fat, such as:  Fruits and vegetables.  Whole grains.  Low-fat dairy products.  Lean meats, fish, and poultry.  Eat small meals often instead of eating 3 large meals each day. Eat your meals slowly in a place where you are relaxed. Avoid bending over or lying down until 2-3 hours after eating.  Limit high-fat foods such as fatty meats or fried foods.  Limit your intake of fatty foods, such as oils, butter, and shortening.  Avoid the following as told by your doctor:  Foods that cause symptoms. These may be different for different people. Keep a food diary to keep track of foods that cause symptoms.  Alcohol.  Drinking a lot of liquid with meals.  Eating meals during the 2-3 hours before bed.  Lifestyle  Stay at a healthy weight. Ask your doctor what weight is healthy for you. If you need to lose weight, work with your doctor to do so safely.  Exercise for at least 30 minutes on 5 or more days each week, or as told by your doctor.  Wear loose-fitting clothes.  Do not smoke or use any products that contain nicotine or tobacco. If you need help quitting, ask your doctor.  Sleep with the head  of your bed higher than your feet. Use a wedge under the mattress or blocks under the bed frame to raise the head of the bed.  Chew sugar-free gum after meals.  What foods should eat?    Eat a healthy, well-balanced diet of fruits, vegetables, whole grains, low-fat dairy products, lean meats, fish, and poultry. Each person is different.  Foods that may cause symptoms in one person may not cause any symptoms in another person. Work with your doctor to find foods that are safe for you.  The items listed above may not be a complete list of what you can eat and drink. Contact a food expert for more options.  What foods should I avoid?  Limiting some of these foods may help in managing the symptoms of GERD. Everyone is different. Talk with a food expert or your doctor to help you find the exact foods to avoid, if any.  Fruits  Any fruits prepared with added fat. Any fruits that cause symptoms. For some people, this may include citrus fruits, such as oranges, grapefruit, pineapple, and anant.  Vegetables  Deep-fried vegetables. French fries. Any vegetables prepared with added fat. Any vegetables that cause symptoms. For some people, this may include tomatoes and tomato products, chili peppers, onions and garlic, and horseradish.  Grains  Pastries or quick breads with added fat.  Meats and other proteins  High-fat meats, such as fatty beef or pork, hot dogs, ribs, ham, sausage, salami, and boyer. Fried meat or protein, including fried fish and fried chicken. Nuts and nut butters, in large amounts.  Dairy  Whole milk and chocolate milk. Sour cream. Cream. Ice cream. Cream cheese. Milkshakes.  Fats and oils  Butter. Margarine. Shortening. Ghee.  Beverages  Coffee and tea, with or without caffeine. Carbonated beverages. Sodas. Energy drinks. Fruit juice made with acidic fruits, such as orange or grapefruit. Tomato juice. Alcoholic drinks.  Sweets and desserts  Chocolate and cocoa. Donuts.  Seasonings and condiments  Pepper.  Peppermint and spearmint. Added salt. Any condiments, herbs, or seasonings that cause symptoms. For some people, this may include waldron, hot sauce, or vinegar-based salad dressings.  The items listed above may not be a complete list of what you should not eat and drink. Contact a food expert for more options.  Questions to ask your doctor  Diet and lifestyle changes are often the first steps that are taken to manage symptoms of GERD. If diet and lifestyle changes do not help, talk with your doctor about taking medicines.  Where to find more information  [employer]: aboutgerd.org  Summary  When you have GERD, food and lifestyle choices are very important in easing your symptoms.  Eat small meals often instead of 3 large meals a day. Eat your meals slowly and in a place where you are relaxed.  Avoid bending over or lying down until 2-3 hours after eating.  Limit high-fat foods such as fatty meats or fried foods.  This information is not intended to replace advice given to you by your health care provider. Make sure you discuss any questions you have with your health care provider.  Document Revised: 06/28/2021 Document Reviewed: 06/28/2021  Elsevier Patient Education © 2023 [employer]

## 2023-10-05 NOTE — PROGRESS NOTES
Chief Complaint     Heartburn    History of Present Illness     Yohana Casillas is a 72 y.o. female who presents to [facility] for follow-up of esophagitis and hiatal hernia.      She reports that she's doing well.  Only taking protonix as needed.      Reports intermittent loose stools that she feels is related to certain foods.  Denies rectal bleeding.       History      Past Medical History:   Diagnosis Date    Anxiety     Cancer     Diabetes mellitus type I     GERD (gastroesophageal reflux disease)     History of thyroplasty 07/2021    Hyperlipidemia     Hypertension     Retinopathy     Thyroid disease      Past Surgical History:   Procedure Laterality Date    CARPAL TUNNEL RELEASE      CHOLECYSTECTOMY      COLONOSCOPY      ENDOSCOPY N/A 10/28/2021    Procedure: ESOPHAGOGASTRODUODENOSCOPY with biopsies;  Surgeon: Nadia Sanz MD;  Location: [facility] ENDOSCOPY;  Service: Gastroenterology;  Laterality: N/A;  esophagitis and gastritis    HYSTERECTOMY      THYROID SURGERY      UPPER GASTROINTESTINAL ENDOSCOPY       Family History   Problem Relation Age of Onset    Diabetes type II Other     Cancer Mother     Cancer Father     Cancer Sister     Hypertension Brother     Diabetes Brother     Malig Hyperthermia Neg Hx         Current Medications       Current Outpatient Medications:     aspirin 81 MG EC tablet, Aspirin Low Dose 81 mg oral tablet,delayed release (DR/EC) take 1 tablet (81 mg) by oral route once daily   Active, Disp: , Rfl:     atorvastatin (LIPITOR) 20 MG tablet, Take 1 tablet by mouth Daily., Disp: 90 tablet, Rfl: 3    carvedilol (COREG) 6.25 MG tablet, Take 1 tablet by mouth 2 (Two) Times a Day., Disp: 180 tablet, Rfl: 3    escitalopram (LEXAPRO) 10 MG tablet, TAKE 1 TABLET EVERY DAY, Disp: 90 tablet, Rfl: 1    Estrogens Conjugated (Premarin) 0.625 MG/GM vaginal cream, Insert  into the vagina 2 (Two) Times a Week. Pea sized amount, Disp: 30 g, Rfl: 12     "hydroCHLOROthiazide (HYDRODIURIL) 25 MG tablet, TAKE 1 TABLET EVERY DAY, Disp: 90 tablet, Rfl: 1    Insulin Aspart (novoLOG) 100 UNIT/ML injection, INJECT UP TO 65 UNITS VIA INSULIN PUMP DAILY, Disp: 20 mL, Rfl: 5    levothyroxine (SYNTHROID, LEVOTHROID) 75 MCG tablet, TAKE ONE TABLET BY MOUTH DAILY, Disp: 90 tablet, Rfl: 0    lisinopril (PRINIVIL,ZESTRIL) 40 MG tablet, TAKE 1 TABLET EVERY DAY, Disp: 90 tablet, Rfl: 1    pantoprazole (PROTONIX) 40 MG EC tablet, Take 1 tablet by mouth Daily., Disp: 90 tablet, Rfl: 3    Sodium Fluoride 5000 Sensitive 1.1-5 % gel, Use twice daily, Disp: , Rfl:      Allergies     Allergies   Allergen Reactions    Macrobid [Nitrofurantoin] GI Intolerance     Constipation, nausea, headache    Sulfa Antibiotics Hives     Other reaction(s): rash    Levofloxacin Rash       Social History       Social History     Social History Narrative    , 2 adult child, lives at home with           Objective       /60 (BP Location: Right arm, Patient Position: Sitting, Cuff Size: Adult)   Pulse 52   Ht 157.5 cm (62\")   Wt 61.2 kg (135 lb)   BMI 24.69 kg/m²       Physical Exam    Results       Result Review :    The following data was reviewed by: JACKIE Thomas on 10/05/2023:      CMP          12/7/2022    07:12 5/30/2023    07:05   CMP   Glucose 160  115    BUN 14  17    Creatinine 0.72  0.78    EGFR 90.1  81.8    Sodium 140  139    Potassium 4.5  4.1    Chloride 103  103    Calcium 9.3  9.6    Total Protein 6.5  6.9    Albumin 4.00  4.2    Globulin 2.5  2.7    Total Bilirubin 0.4  0.5    Alkaline Phosphatase 70  71    AST (SGOT) 20  18    ALT (SGPT) 13  14    Albumin/Globulin Ratio 1.6  1.6    BUN/Creatinine Ratio 19.4  21.8    Anion Gap 9.0  6.5                 Assessment and Plan              Diagnoses and all orders for this visit:    1. Gastroesophageal reflux disease, unspecified whether esophagitis present (Primary)    2. Esophagitis  -     pantoprazole " (PROTONIX) 40 MG EC tablet; Take 1 tablet by mouth Daily.  Dispense: 90 tablet; Refill: 3      Continue with current plan of care.  Due for screening colonoscopy in December, 2024.      Follow Up     Follow Up   Return in about 1 year (around 10/5/2024) for GERD.  Patient was given instructions and counseling regarding her condition or for health maintenance advice. Please see specific information pulled into the AVS if appropriate.

## 2023-10-26 ENCOUNTER — TELEPHONE (OUTPATIENT)
Dept: UROLOGY | Facility: CLINIC | Age: 73
End: 2023-10-26
Payer: MEDICARE

## 2023-11-07 RX ORDER — LEVOTHYROXINE SODIUM 0.07 MG/1
75 TABLET ORAL DAILY
Qty: 90 TABLET | Refills: 0 | Status: SHIPPED | OUTPATIENT
Start: 2023-11-07

## 2023-11-09 NOTE — TELEPHONE ENCOUNTER
SPOKE TO PT AND SHE HAS THE [employer] INSURANCE, SHE IS GOING TO CALL HER INSURANCE TO SEE IF THEY WOULD COVER BECAUSE SHE DOESN'T WANT A BILL, SHE STATED SHE WILL CALL BACK IF THEY WILL COVER THE APPT, ANYTHING ELSE TO DO?

## 2023-11-23 NOTE — ASSESSMENT & PLAN NOTE
Continue current rx   
Continue current rx   
Continue to monitor bp at home   
She has a follow up appt with MARLON Key, will return fasting for labs   
Will recheck her TSH with fasting labs   
Will x-ray her right hip, consider further evaluation or referral   
Vani

## 2023-12-03 ENCOUNTER — E-VISIT (OUTPATIENT)
Dept: FAMILY MEDICINE CLINIC | Facility: TELEHEALTH | Age: 73
End: 2023-12-03
Payer: MEDICARE

## 2023-12-03 NOTE — E-VISIT TREATED
Chief Complaint: Cold, flu, COVID, sinus, hay fever, or seasonal allergies   Patient introduction   Patient is 73-year-old female with cough, congestion, nasal discharge, voice hoarseness, and headache that started 3 to 5 days ago. Regarding date of symptom onset, patient writes: 12/29/23.   COVID-19 testing history, vaccination status, and exposure:    Patient was tested for COVID-19 within the last 24 hours. Test result was negative.    Has not received an updated [id number] COVID-19 vaccination (Pfizer-BioNTech or Moderna vaccine after September 12, 2023; or Novavax vaccine after October 3, 2023).    No known exposure to a person with a confirmed or suspected case of COVID-19.    No high-risk (household) exposure to COVID-19 within the last 14 days.    No travel outside local community within the last 14 days.   Risk factors for severe disease from COVID-19 infection    Age 65 or older.    Higher risk for severe disease from COVID-19 because of BMI >= 25.    Hypertension.    Diabetes.   Warning. The following may warrant further investigation:    Hypertension.   General presentation   Symptoms came on gradually.   Fever:    No fever.   Sinus and nasal symptoms:    Nasal discharge.    Postnasal drip.    1 to 3 episodes of antibiotic treatment for sinus infection in the last year.    Sinus pain or pressure on or around the forehead, eyes, nose, and cheeks.    Patient first noticed sinus pain less than 5 days ago.    Sinus pain is worse with Valsalva.    No itchy nose or sneezing.    No nasal drainage.    No history of unhealed nasal septal ulcer/nasal wound.    No history of deviated septum or nasal polyps.   Throat symptoms:    Voice is moderately hoarse. Patient does not believe hoarseness is due to voice strain.    No sore throat.   Head and body aches:    Headache described as mild (1 to 3 on a scale of 1 to 10).    No sweats.    No chills.    No myalgia.    No fatigue.   Cough:    Cough is not noticeably  "worse depending on time of day    Cough is productive of sputum.    Describes color of sputum as yellow.   Wheezing and shortness of breath:    No COPD diagnosis.    No asthma diagnosis.    No wheezing.    No shortness of breath.    No previous albuterol inhaler use for URIs, bronchitis, or pneumonia.    No previous steroid inhaler use for URIs, bronchitis, or pneumonia.   Chest pain:    No chest pain.   Ear symptoms:    Current symptoms include pressure in the ear(s).   Dizziness:    No dizziness.   Allergies:    No history of allergies.   Flu exposure:    No recent known exposure to a person with a confirmed flu diagnosis.    Received a flu vaccine in the last 2 to 4 weeks.   Patient is taking over-the-counter medications for current symptoms, including acetaminophen and loratadine.   Review of red flags/alarm symptoms:    No changes in alertness or awareness.    No symptoms suggesting intracranial hemorrhage.    No decreased urination.   Risk factors for antibiotic resistance:    Diabetes.   Pregnancy/menstrual status/breastfeeding:    Patient is postmenopausal.   Self-exam:    Height: 5' 2\"    Weight: 138 lbs    Neck lymph nodes feel normal.   Recent antibiotic use:    Has not taken antibiotics for similar symptoms within the past month.   Current medications   Currently taking hydroCHLOROthiazide 25 MG tablet, aspirin 81 MG EC tablet, carvedilol 6.25 MG tablet, Insulin Aspart 100 UNIT/ML injection, escitalopram 10 MG tablet, pantoprazole 40 MG EC tablet, Sodium Fluoride 5000 Sensitive 1.1-5 % gel, atorvastatin 20 MG tablet, lisinopril 40 MG tablet, levothyroxine 75 MCG tablet, and VITAMIN A/VITAMIN D/VITAMIN E.   Medication allergies   None.   Medication contraindication review   Patient has history of hypertension and thyroid disease. Therefore, the following medication(s) will not be prescribed:    Metoclopramide.    Acetaminophen-diphenhydramine-phenylephrine.    Pseudoephedrine.   "  Aspirin-chlorpheniramine-phenylephrine.   No history of metoclopramide-associated dystonic reaction and tardive dyskinesia.   No known history of amoxicillin-clavulanate-associated cholestatic jaundice or hepatic impairment.   No known history of azithromycin-associated cholestatic jaundice or hepatic impairment.   Past medical history   Immune conditions:   No immunocompromising conditions.   No history of cancer.   Social history   Never smoked tobacco.   High-risk household contacts:    Household contact 65 years or older.   Patient did not request an excuse note.   Assessment   Viral sinusitis.   This is the likely diagnosis based on patient's interview responses, including:    Symptom profile    Duration of symptoms is shorter than 10 days   Plan   Medications:    benzonatate 200 mg capsule RX 200mg 1 cap PO tid PRN 7d for cough. Do not chew or cut these capsules. Amount is 21 cap.    Mucus Relief  mg tablet, extended release OTC 600mg 1 tab PO q12h PRN 7d for cough and congestion. Amount is 14 tab.    triamcinolone 55 mcg nasal spray OTC 55mcg 2 sprays nasal qd 30d for nasal symptoms due to allergies or sinusitis. Triamcinolone needs to be used every day to be effective. It may take up to a week for the full effects of the medication to be seen. Brands to look for include Nasacort Allergy 24HR. Amount is 16.9 mL.   The patient's prescription will be sent to:   [facility] [id number]   [address] [place] [address]   Phone: [phone]     Fax: [phone]   Patient informed to purchase OTC medications.   Education:    Condition and causes    Prevention    Treatment and self-care    When to call provider   ----------   Electronically signed by JACKIE Ndiaye on 2023-12-03 at 15:58PM   ----------   Patient Interview Transcript:   Which of these symptoms are bothering you? Select all that apply.    Cough    Stuffed-up nose or sinuses    Runny nose    Hoarse voice or loss of voice    " Headache   Not selected:    Shortness of breath    Itchy or watery eyes    Itchy nose or sneezing    Loss of smell or taste    Sore throat    Fever    Sweats    Chills    Muscle or body aches    Fatigue or tiredness    Nausea or vomiting    Diarrhea    I don't have any of these symptoms   When did your current symptoms start? Select one.    3 to 5 days ago   Not selected:    Less than 48 hours ago    6 to 9 days ago    10 to 14 days ago    2 to 3 weeks ago    3 to 4 weeks ago    More than a month ago   Do you know the exact date your symptoms started? If so, enter the date as MM/DD/YY. Select one.    Yes (specify): 12/29/23   Not selected:    No   Did your symptoms come on suddenly or gradually? Select one.    Gradually   Not selected:    Suddenly    I'm not sure   Since your current symptoms started, have you been tested for COVID-19? This includes home self-tests as well as nose swab or saliva tests done at a doctor's office, lab, or testing site. Select one.    Yes   Not selected:    No   When was your most recent COVID-19 test? Select one.    Within the last 24 hours   Not selected:    Within the last week (specify date as MM/DD/YY)    7 to 14 days ago    15 to 30 days ago    More than 1 month ago   What was the result of your most recent COVID-19 test? Select one.    Negative   Not selected:    Positive   Has anyone in your household tested positive for COVID-19 in the past 14 days? Select one.    No   Not selected:    Yes   In the last 14 days, have you had close contact with someone who has COVID-19? \"Close contact\" means any of these: - Caring for someone with COVID-19. - Being within 6 feet of someone with COVID-19 for a total of at least 15 minutes over a 24-hour period. For example, three 5-minute exposures for a total of 15 minutes. - Being in direct contact with respiratory droplets from someone with COVID-19 (being coughed on, kissing, sharing utensils). Select one.    No, not that I know of   Not " selected:    Yes, a confirmed case    Yes, a suspected case   Have you gotten the [id number] updated COVID-19 vaccine? This means either the updated Pfizer-BioNTech or Moderna vaccine after September 12, 2023; or the updated Novavax vaccine after October 3, 2023. Select one.    No   Not selected:    Yes   In the last 14 days, have you traveled outside of your local community? This includes travel by car, RV, bus, train, or plane. Travel increases your chances of getting and spreading COVID-19. Select one.    No   Not selected:    Yes   You mentioned having a headache. On a scale of 1 to 10, how severe is your headache pain? Select one.    Mild (1 to 3)   Not selected:    Moderate (4 to 6)    Severe (7 to 9)    Unbearable (10)    The worst headache of my life (10+)   Do you cough so hard that it's made you gag or vomit? By gag, we mean has your coughing made you choke or dry heave? Select all that apply.    Yes, my coughing has made me gag   Not selected:    Yes, my coughing has made me vomit    No   When is your cough the worst? Select all that apply.    I haven't noticed a difference depending on time of day   Not selected:    In the morning, or when I wake up    During the day    At nighttime, or while I'm sleeping   Are you coughing up mucus or phlegm? Select one.    Yes, a lot   Not selected:    No, my cough is dry    Yes, a little   What color is most of the mucus or phlegm that you're coughing up? Select one.    Yellow or yellowish   Not selected:    Clear    White/frothy    Green or greenish    Red or pink    I'm not sure   Do you feel sinus pain or pressure in any of these areas?    In my forehead    Around my eyes    Behind my nose    In my cheeks   Not selected:    In my upper teeth or jaw    No   When did you first notice your sinus pain or pressure? Select one.    Less than 5 days ago   Not selected:    5 to 9 days ago    10 to 14 days ago    2 to 4 weeks ago    1 month ago or longer   Does coughing,  "sneezing, or leaning forward make your sinuses feel worse? Select one.    Yes   Not selected:    No   What color is your nasal drainage? Select one.    My nose is stuffed but not draining or running   Not selected:    Clear    White    Yellow or yellowish    Green or greenish   Is there any drainage (mucus) going down the back of your throat? This kind of drainage is also called \"postnasal drip.\" It can cause frequent throat clearing. Select one.    Yes   Not selected:    No, not that I know of   Since your symptoms started, have you felt dizzy? Select one.    No   Not selected:    Yes, but I can continue with my regular daily activities    Yes, and it makes it hard to stand, walk, or do daily activities   Do you have chest pain? You might also feel it as discomfort, aching, tightness, or squeezing in the chest. Select one.    No   Not selected:    Yes   Have you urinated at least 3 times in the last 24 hours? Select one.    Yes   Not selected:    No   Changes in alertness or awareness may mean you need emergency care. Since your symptoms started, have you had any of these? Select all that apply.    None of the above   Not selected:    Confusion    Slurred speech    Not knowing where you are or what day it is    Difficulty staying conscious    Fainting or passing out   Do your symptoms include a whistling sound, or wheezing, when you breathe? Select one.    No   Not selected:    Yes   Early in this interview, you told us you were hoarse or you'd lost your voice. How would you describe the changes to your voice? Select one.    It's harder than usual to talk   Not selected:    It just sounds a little raspy    I can barely talk at all   Is it possible that you strained your voice? Singing, yelling, or talking more or louder than usual can cause voice strain. Select one.    No, not that I know of   Not selected:    Yes   Do you have any of these symptoms in your ear(s)? Select all that apply.    Pressure   Not " selected:    Pain    Fullness    Crackling or popping    Plugged or blocked sensation    None of the above   Are your glands/lymph nodes swollen, or does it hurt when you touch them?    No, not that I can tell   Not selected:    Yes   In the past week, has anyone around you (such as at school, work, or home) had a confirmed diagnosis of the flu? A confirmed diagnosis means that a nose swab was done to verify a flu infection. Select all that apply.    No, not that I know of   Not selected:    I live with someone who has the flu    I've been within touching distance of someone who has the flu    I've walked by, or sat about 3 feet away from, someone who has the flu    I've been in the same building as someone who has the flu   Have you ever been diagnosed with asthma? Select one.    No   Not selected:    Yes   Have you ever been prescribed albuterol to use for wheezing, cough, or shortness of breath caused by a cold, bronchitis, or pneumonia? Albuterol (ProAir, Proventil, Ventolin) is prescribed as an inhaler or a solution to be used with a nebulizer machine. Select one.    No, not that I know of   Not selected:    Yes   Have you ever been prescribed a steroid inhaler to use for wheezing, cough, or shortness of breath caused by a cold, bronchitis, or pneumonia? Some examples of steroid inhalers include Pulmicort, Flovent, Qvar, and Alvesco. Select one.    No, not that I know of   Not selected:    Yes   Have you ever been diagnosed with chronic obstructive pulmonary disease (COPD)? Select one.    No, not that I know of   Not selected:    Yes   In the past month, have you taken antibiotics for similar symptoms? Examples of antibiotics include amoxicillin, amoxicillin-clavulanate (Augmentin), penicillin, cefdinir (Omnicef), doxycycline, and clindamycin (Cleocin). Select one.    No   Not selected:    Yes (specify)   In the last year, how many times were you treated with antibiotics for a sinus infection? Select one.    1  to 3 times   Not selected:    None    4 or more times   Have you been diagnosed with a deviated septum or nasal polyps? The nose is divided into two nostrils by the septum. A crooked septum is called a deviated septum. Nasal polyps are growths inside the nose or sinuses. Select one.    No, not that I know of   Not selected:    Yes, but I had surgery to treat them    Yes, I have a deviated septum    Yes, I have nasal polyps    Yes, I have a deviated septum and nasal polyps   Do you have a sore inside your nose that won't heal? Select one.    No, not that I know of   Not selected:    Yes   Do you have allergies (pollen, dust mites, mold, animal dander)? Select one.    No, not that I know of   Not selected:    Yes   Have you had a flu shot this season? Select one.    Yes, 2 to 4 weeks ago   Not selected:    Yes, less than 2 weeks ago    Yes, 1 to 3 months ago    Yes, 3 to 6 months ago    Yes, more than 6 months ago    No   Have you gone through menopause? Select one.    Yes   Not selected:    No    I'm going through it now   The flu and COVID-19 can be more serious for people in certain groups. The next few questions help us figure out if you or anyone you live with is at higher risk for complications from these infections. Do any of these statements apply to you? Select all that apply.    None of the above   Not selected:    I'm     I'm     I'm Black    I'm  or    Do you smoke tobacco? Select one.    No   Not selected:    Yes, every day    Yes, some days    No, I quit   Do you have a mostly inactive lifestyle? Answer yes if all of these are true: - You spend at least 6 hours a day sitting or lying down - You get less than 2 and a half hours per week of moderate exercise such as walking fast - You get less than 1 hour and 15 minutes per week of intense exercise such as jogging or running Select one.    No   Not selected:    Yes   Do you have any of these conditions? Select all  that apply.    None of the above   Not selected:    Chronic lung disease, such as cystic fibrosis or interstitial fibrosis    Heart disease, such as congenital heart disease, congestive heart failure, or coronary artery disease    Disorder of the brain, spinal cord, or nerves and muscles, such as dementia, cerebral palsy, epilepsy, muscular dystrophy, or developmental delay    Metabolic disorder or mitochondrial disease    Cerebrovascular disease, such as stroke or another condition affecting the blood vessels or blood supply to the brain    Down syndrome    Mood disorder, including depression or schizophrenia spectrum disorders    Substance use disorder, such as alcohol, opioid, or cocaine use disorder    Tuberculosis    Primary immunodeficiency   Do you live in a group care setting? Examples include: - Nursing home - Residential care - Psychiatric treatment facility - Group home - [facility] - [facility] and care home - Homeless shelter - Foster care setting Select one.    No   Not selected:    Yes   Are you a healthcare worker? Select one.    No   Not selected:    Yes   People with a very high body mass index (BMI) are at higher risk for developing complications from the flu and severe illness from COVID-19. To determine your BMI, we need to know your weight and height. Please enter your weight (in pounds).    Weight   Please enter your height.    Height   Do you have any of these conditions that can affect the immune system? Scroll to see all options. Select all that apply.    None of these   Not selected:    History of bone marrow transplant    Chronic kidney disease    Chronic liver disease (including cirrhosis)    HIV/AIDS    Inflammatory bowel disease (Crohn's disease or ulcerative colitis)    Lupus    Moderate to severe plaque psoriasis    Multiple sclerosis    Rheumatoid arthritis    Sickle cell anemia    Alpha or beta thalassemia    History of solid organ transplant (kidney, liver, or heart)    History of  spleen removal    An autoimmune disorder not listed here (specify)    A condition requiring treatment with long-term use of oral steroids (such as prednisone, prednisolone, or dexamethasone) (specify)   Have you ever been diagnosed with cancer? Select one.    No   Not selected:    Yes, I have cancer now    Yes, but I'm in remission   Do any of these apply to you? Select all that apply.    I have diabetes   Not selected:    I've been hospitalized within the last 5 days    I'm in close contact with a child in     None of the above   The flu and COVID-19 can be more serious for people in certain groups. Do any of these apply to the people who live with you? Select all that apply.    Over age 65   Not selected:    Under age 5            Black     or     Pregnant    Has given birth, had a miscarriage, had a pregnancy loss, or had an  in the last 2 weeks    None of the above   Does any member of your household have any of these medical conditions? Select all that apply.    None of the above   Not selected:    Asthma    Disorders of the brain, spinal cord, or nerves and muscles, such as dementia, cerebral palsy, epilepsy, muscular dystrophy, or developmental delay    Chronic lung disease, such as COPD or cystic fibrosis    Heart disease, such as congenital heart disease, congestive heart failure, or coronary artery disease    Cerebrovascular disease, such as stroke or another condition affecting the blood vessels or blood supply to the brain    Blood disorders, such as sickle cell disease    Diabetes    Metabolic disorders such as inherited metabolic disorders or mitochondrial disease    Kidney disorders    Liver disorders    Weakened immune system due to illness or medications such as chemotherapy or steroids    Children under the age of 19 who are on long-term aspirin therapy    Extreme obesity (BMI > 40)   Do you have any of these conditions? Scroll to see all  options. Select all that apply.    High blood pressure    Thyroid disease   Not selected:    Aspirin triad (also known as Samter's triad or ASA triad)    Asthma or hives from taking aspirin or other NSAIDs, such as ibuprofen or naproxen    Blockage or narrowing of the blood vessels of the heart    Blood clotting disorder    Blood dyscrasia, such anemia, leukemia, lymphoma, or myeloma    Bone marrow depression    Catecholamine-releasing paraganglioma    Congenital long QT syndrome    Depression    Difficulty urinating or completely emptying your bladder    Uncorrected electrolyte abnormalities    Fungal infection    Gastrointestinal (GI) bleeding    Gastrointestinal (GI) obstruction    G6PD deficiency    Recent heart attack    Irregular heartbeat or heart rhythm    Mononucleosis (mono)    Myasthenia gravis    Parkinson's disease    Pheochromocytoma    Reye syndrome    Seizure disorder    Ulcerative colitis    None of the above   Have you ever had either of these conditions? Select all that apply.    No   Not selected:    Metoclopramide-associated dystonic reaction    Tardive dyskinesia   Just a few more questions about medications, and then you're finished. Have you used any non-prescription medications or nasal sprays for your current symptoms? Examples include saline sprays, decongestants, NyQuil, and Tylenol. Select one.    Yes   Not selected:    No   Which of these non-prescription medications have you tried? Scroll to see all options. Select all that apply.    Acetaminophen (Tylenol)    Loratadine (Alavert, Claritin)   Not selected:    Budesonide (Rhinocort)    Cetirizine (Zyrtec)    Chlorpheniramine (Aller-chlor, Chlor-Trimeton)    Cromolyn (NasalCrom)    Dextromethorphan (Delsym, Robitussin, Vicks DayQuil Cough)    Diphenhydramine (Benadryl)    Fexofenadine (Allegra)    Fluticasone (Flonase)    Guaifenesin (Mucinex)    Guaifenesin/dextromethorphan (Delsym DM, Mucinex DM, Robitussin DM)    Ibuprofen (Advil,  Motrin, Midol)    Ketotifen (Alaway, Zaditor)    Naphazoline-pheniramine (Naphcon-A, Opcon-A, Visine-A)    Omeprazole (Prilosec)    Oxymetazoline (Afrin)    Phenylephrine (Sudafed PE)    Triamcinolone (Nasacort)    None of the above   Have you taken any monoamine oxidase inhibitor (MAOI) medications in the last 14 days? Examples include rasagiline (Azilect), selegiline (Eldepryl, Zelapar), isocarboxazid (Marplan), phenelzine (Nardil), and tranylcypromine (Parnate). Select one.    No, not that I know of   Not selected:    Yes   Do you take Kynmobi or Apokyn (apomorphine)? Select one.    No   Not selected:    Yes   Are you still taking these medications listed in your medical record? If you're not taking any of these, click Next. Select all that apply.    hydroCHLOROthiazide 25 MG tablet    aspirin 81 MG EC tablet    carvedilol 6.25 MG tablet    Insulin Aspart 100 UNIT/ML injection    escitalopram 10 MG tablet    pantoprazole 40 MG EC tablet    Sodium Fluoride 5000 Sensitive 1.1-5 % gel    atorvastatin 20 MG tablet    lisinopril 40 MG tablet    levothyroxine 75 MCG tablet   Are you taking any other medications, vitamins, or supplements? Select one.    Yes   Not selected:    No   Have you ever had an allergic or bad reaction to any medication? Select one.    No   Not selected:    Yes   Are you allergic to milk or to the proteins found in milk (for example, whey or casein)? A milk allergy is different from lactose intolerance. Select one.    No, not that I know of   Not selected:    Yes   Have you ever had jaundice or liver problems as a result of taking amoxicillin-clavulanate (Augmentin)? Jaundice is a condition in which the skin and the whites of the eyes turn yellow. Select all that apply.    No, not that I know of   Not selected:    Yes, jaundice    Yes, liver problems   Have you ever had jaundice or liver problems as a result of taking azithromycin (Zithromax, Zmax)? Jaundice is a condition in which the skin and  the whites of the eyes turn yellow. Select all that apply.    No, not that I know of   Not selected:    Yes, jaundice    Yes, liver problems   Do you need a doctor's note? A doctor's note confirms that you received care today and states when you can return to school or work. It does not contain information about your diagnosis or treatment plan. Your provider will make the final decision on whether to give you a doctor's note and for how long. Doctor's notes CANNOT be backdated. We can't provide medical leave paperwork through this type of visit. If more paperwork is needed to request time off, contact your primary care provider. Select one.    No   Not selected:    Today only (1 day)    Today and tomorrow (2 days)    3 days    5 days    7 days    10 days    14 days   Is there anything you'd like to add about your symptoms? Please limit your comments to the symptoms asked about in this interview. If you include comments about other concerns, your provider may recommend that you be seen in person.   The patient did not enter any additional information.   ----------   Medical history   Medical history data does not currently exist for this patient.

## 2023-12-03 NOTE — EXTERNAL PATIENT INSTRUCTIONS
Diagnosis   Viral sinusitis   My name is JACKIE Ndiaye, and I'm a healthcare provider at [facility]. From your interview, I see that you have viral sinusitis (sinus infection).   To prevent the spread of illness to others, stay home and away from other people as much as possible while you're sick. When you need to be around other people, consider wearing a face mask.   Here are the main things to know about your current symptoms:    A viral sinusitis infection will get better on its own.    You can use the medications I recommend here to help ease your symptoms.   Based on what you told me in your interview, I haven't prescribed any antibiotics. Antibiotics fight bacteria, not viruses. The latest research shows that 90% of sinus infections are caused by viruses. Antibiotics won't help with your viral infection. They could even make you feel worse as they can cause or worsen nausea, diarrhea, and stomach pain. The following factors suggest that a virus is causing your symptoms:    You've had symptoms for less than 10 days. A bacterial infection is suspected when you've had symptoms longer than 10 days without improvement.    You haven't had a fever. Bacterial infections can cause a high fever.   Medications   Your pharmacy   [facility] [id number] [address] [facility] [address] [phone]     Prescription   Benzonatate (200mg): Take 1 capsule by mouth 3 times a day as needed for cough. Do not chew or cut these capsules.   Non-prescription   Mucus Relief ER oral tablet, extended release (600mg): Take 1 tablet by mouth every 12 hours as needed for cough and congestion.   Triamcinolone (55mcg): Spray 2 times in each nostril daily for nasal symptoms due to allergies or sinusitis. Triamcinolone needs to be used every day to be effective. It may take up to a week for the full effects of the medication to be seen. Brands to look for include [employer] 24HR.   About your diagnosis   Viral  sinusitis is an infection of the sinuses, the hollow spaces connected to your nasal passages. These passages swell and block the drainage of fluid or mucus from the nose, causing pain, pressure, and congestion. These are the key things to know about a viral sinus infection:    It usually starts with cold symptoms.    Some medications can lessen your symptoms.    You can spread it to other people.    The 200 different viruses that cause the common cold can also cause a viral sinus infection.   Common symptoms include fever, sneezing, runny nose, congestion, sinus pain and pressure, sore throat, and cough. You may also notice fatigue, body aches, difficulty sleeping, or decreased appetite.   What to expect   You should feel better within 1 to 2 weeks.   When to seek care   Call us at [phone]   with any sudden or unexpected symptoms.    Fever that measures over 103F or continues for more than 3 days.    Your headache worsens.    Swallowing becomes extremely difficult or impossible.    Your hoarse voice or loss of voice lasts longer than 2 weeks.    Your sinus pain continues for 10 days or more, without improvement.    More than 5 episodes of diarrhea in a day.    More than 5 episodes of vomiting in a day.    Coughing up red or bloody mucus.    Severe shortness of breath.    Severe stomach pain.    Symptoms that get better for a few days, and then suddenly get worse.    Severe chest pain   Other treatment    Rest! Your body needs rest to recover and fight infection.    Drink plenty of water to stay hydrated.    Use a clean humidifier or a cool-mist vaporizer in your room at night. Breathing humid air may help you feel better.    Place a warm, moist washcloth over your nose and forehead to help with your sinus pain and pressure.    Try non-prescription saline nasal sprays to help your nasal symptoms.    Try using a Neti Pot to flush out your stuffy nose and sinuses. Neti Pots are available at any drugstore  without a prescription.    Gargle with salt water several times a day to help your throat feel better. Cough drops and throat lozenges may provide extra relief. A teaspoon of honey stirred into warm water or weak tea can help soothe a sore throat and cough.    Avoid smoke and air pollution. Smoke can make infections worse.   Prevention    Avoid close contact with other people when you're sick.    Cover your mouth and nose when you cough or sneeze. Use a tissue or cough into your elbow. Make sure that used tissues go directly into the trash.    Avoid touching your eyes, nose, or mouth while you're sick.    Wash your hands often, especially after coughing, sneezing, or blowing your nose. If soap and water are not available, use an alcohol-based hand .    If you or someone in your home or workplace is sick, disinfect commonly used items. This includes door handles, tables, computers, remotes, and pens.    Coronavirus (COVID-19) information   Common symptoms of COVID-19 include fever, cough, shortness of breath, fatigue, muscle or body aches, headaches, new loss of sense of taste or smell, sore throat, stuffy or runny nose, nausea or vomiting, and diarrhea. Most people who get COVID-19 have mild symptoms and can rest at home until they get better. Elderly people and those with chronic medical problems may be at risk for more serious complications.   FAQs about the COVID-19 vaccine   There are four authorized COVID-19 vaccines: [employer] Jazz Vaccine (J&J/Jazz), Moderna, Novavax, and Pfizer-BioNTech (Pfizer). The J&J/Jazz and Novavax vaccines are approved for use in people aged 18 and older. The Moderna and Pfizer vaccines are approved for those aged 6 months and older. All four are available at no cost. Even if you don't have health insurance, you can still get the COVID-19 vaccine for free.   Which vaccine is the best? Which vaccine should I get?   All four vaccines are highly effective.  Even if you get COVID-19 after being vaccinated, all of the vaccines help prevent severe disease, hospitalization, and complications.   Most people should get whichever vaccine is first available to them. However, women younger than 50 years old should consider the rare risk of blood clots with low platelets after vaccination with the J&J/Jazz vaccine. This risk hasn't been seen with the other three vaccines.   Are the vaccines safe?   Yes. Hundreds of millions of people in the [place] have already safely received COVID-19 vaccines under the most intense safety monitoring in the history of the [place].   Do I need the vaccine if I've already had COVID?   Yes. Vaccination helps protect you even if you've already had COVID.   If you had COVID-19 and had symptoms, wait to get vaccinated until you've recovered and completed your isolation period.   If you tested positive for COVID-19 but did not have symptoms, you can get vaccinated after 5 full days have passed since you had a positive test, as long as you don't develop symptoms.   How many doses of the vaccine do I need?   Visit www.cdc.gov/coronavirus/2019-ncov/vaccines/stay-up-to-date.html   to find out how to stay up to date with your COVID-19 vaccines.   I'm immunocompromised. How many doses of the vaccine do I need?   For information on how immunocompromised people can stay up to date with their COVID-19 vaccines, visit www.cdc.gov/coronavirus/2019-ncov/vaccines/recommendations/immuno.html  .   What are the common side effects of the vaccine?   A sore arm, tiredness, headache, and muscle pain may occur within two days of getting the vaccine and last a day or two. For the Moderna or Pfizer vaccines, side effects are more common after the second dose. People over the age of 55 are less likely to have side effects than younger people.   After I'm up to date on vaccines, can I still get or spread COVID?   Yes, you can still get COVID, but your disease should be milder. And  your risk of serious illness, hospitalization, and complications will be much lower, especially if you're up to date. Unfortunately, you can still spread COVID if you've been vaccinated. That's why it's important to follow isolation guidelines if you get sick or test positive.   After I'm up to date on vaccines, can I go back to normal?   You should still wear a mask indoors in public if:    It's required by laws, rules, regulations, or local guidance.    You have a weakened immune system.    Your age puts you at increased risk of severe disease.    You have a medical condition that puts you at increased risk of severe disease.    Someone in your household has a weakened immune system, is at increased risk for severe disease, or is unvaccinated.    You're in an area of high transmission.   Where can I get a COVID-19 vaccine?   Visit [facility] [employer]'s website for more information. To find a COVID-19 vaccination site near you, visit www.vaccines.gov/  , call [phone]  , or text your zip code to 846002 (TIME PLUS Q). Message and data rates may apply.   What about travel?   Travel increases your risk of exposure to COVID-19. For more information, see www.cdc.gov/coronavirus/2019-ncov/travelers/index.html  .   I've had close contact with someone who has COVID. Do I need to quarantine, and if so, for how long?   For the most current answer, including a calculator to determine whether you need to stay home and for how long, visit www.cdc.gov/coronavirus/2019-ncov/your-health/quarantine-isolation.html  .   I've tested positive for COVID. How long do I need to isolate?   For the latest recommendations, including a calculator to determine how long you need to stay home, visit www.cdc.gov/coronavirus/2019-ncov/your-health/quarantine-isolation.html  .   What if I develop symptoms that might be from COVID?   For the latest recommendations on what to do if you're sick, including when to seek emergency care, visit  www.cdc.gov/coronavirus/2019-ncov/if-you-are-sick/steps-when-sick.html  .    Flu vaccine information   Who should get a flu vaccine?   Everyone 6 months of age and older should get a yearly flu vaccine.   When should I get vaccinated?   You should get a flu vaccine by the end of October. Once you're vaccinated, it takes about two weeks for antibodies to develop and protect you against the flu. That's why it's important to get vaccinated as soon as possible.   After October, is it too late to get vaccinated?   No. You should still get vaccinated. As long as the flu viruses are still in your community, flu vaccines will remain available, even into January of next year or later.   Why do I need a flu vaccine EVERY year?   Flu viruses are constantly changing, so flu vaccines are usually updated from one season to the next. Your protection from the flu vaccine also lessens over time.   Is the flu vaccine safe?   Yes. Over the last 50 years, hundreds of millions of Americans have safely received the flu vaccines.   What are the side effects of flu vaccines?   You CANNOT get the flu from a flu vaccine. Common side effects of the flu shot include soreness, redness and/or swelling where the shot was given, low grade fever, and aches. Common side effects of the nasal spray flu vaccine for adults include runny nose, headaches, sore throat, and cough. For children, side effects include wheezing, vomiting, muscle aches, and fever.   Does the flu vaccine increase your risk of getting COVID-19?   No. There is no evidence that getting a flu vaccine increases your risk of getting COVID-19.   Is it safe to get the flu vaccine along with a COVID-19 vaccine?   Yes. It's safe to get the flu vaccine with a COVID-19 vaccine or booster.   Contact your healthcare provider TODAY for details on when and where to get your flu vaccine.   Your provider   Your diagnosis was provided by JACKIE Ndiaye, a member of your trusted care team at  [facility].   If you have any questions, call us at [phone]  .

## 2024-01-06 DIAGNOSIS — E10.9 CONTROLLED DIABETES MELLITUS TYPE 1 WITHOUT COMPLICATIONS: ICD-10-CM

## 2024-01-08 RX ORDER — INSULIN ASPART 100 [IU]/ML
INJECTION, SOLUTION INTRAVENOUS; SUBCUTANEOUS
Qty: 20 ML | Refills: 5 | Status: SHIPPED | OUTPATIENT
Start: 2024-01-08

## 2024-01-12 NOTE — PROGRESS NOTES
Chief Complaint  Diabetes (Follow up, med mgt, A1c eval, cgm/pump eval )    Referred By: JOSE Rosen*    Subjective          Yohana Casillas presents to [facility] for diabetes medication management    History of Present Illness    Visit type:  follow-up  Diabetes type:  Type 1  Current diabetes status/concerns/issues:  She had a lot of highs due to steroids  Other health concerns:  She received a steroid injection for right hip pain and she had to take oral steroids for treatment of left foot plantar fascitis   Current Diabetes symptoms:    Polyuria: No   Polydipsia: No   Polyphagia: No   Blurred vision: No   Excessive fatigue: No  Known Diabetes complications:  Neuropathy: None; Location: N/A  Renal: Stage II mild (GFR = 60-89 mL/min)  Eyes: Moderate Nonproliferative Retinopathy and Without Macular Edema; Location: Bilateral  Amputation/Wounds: None  GI: None  Cardiovascular: Hypertension and Hyperlipidemia  ED: N/A  Other: None  Hypoglycemia:  Level 1 hypoglycemia (54 mg/dL - 70 mg/dL); Frequency - 2% per CGM and Level 2 (less than 54 mg/dL); Frequency - 1% per CGM  Hypoglycemia Symptoms:  shaking/tremors  Current diabetes treatment:  Tandem insulin pump using NovoLog insulin. She is using approximately 30 units of insulin a day    Blood glucose device:  Dexcom CGM  Blood glucose monitoring frequency:  Continuous per CGM  Blood glucose range/average:  The 14-day sensor report shows an average glucose of 165 mg/dL, with 63% in target range ( mgdL), 26% in the high range (181-250 mg/dL), 9% in the very high range (>250 mg/dL), 2% in the low range (54-70 mg/dL) and 1% in the very low range (<54 mg/dL).   Glucose Source: Device Reviewed  Diet:  Carbohydrate Counting, Limits high carb/sweet foods, Avoids sugary drinks  Activity/Exercise:   active with work    Past Medical History:   Diagnosis Date    Anxiety     Cancer     Diabetes mellitus type I     GERD  (gastroesophageal reflux disease)     History of thyroplasty 07/2021    Hyperlipidemia     Hypertension     Retinopathy     Thyroid disease      Past Surgical History:   Procedure Laterality Date    CARPAL TUNNEL RELEASE      CHOLECYSTECTOMY      COLONOSCOPY      ENDOSCOPY N/A 10/28/2021    Procedure: ESOPHAGOGASTRODUODENOSCOPY with biopsies;  Surgeon: Nadia Sanz MD;  Location: [facility] END[facility];  Service: Gastroenterology;  Laterality: N/A;  esophagitis and gastritis    HYSTERECTOMY      THYROID SURGERY      UPPER GASTROINTESTINAL ENDOSCOPY       Family History   Problem Relation Age of Onset    Diabetes type II Other     Cancer Mother     Cancer Father     Cancer Sister     Hypertension Brother     Diabetes Brother     Malig Hyperthermia Neg Hx      Social History     Socioeconomic History    Marital status:     Number of children: 2   Tobacco Use    Smoking status: Never    Smokeless tobacco: Never   Vaping Use    Vaping Use: Never used   Substance and Sexual Activity    Alcohol use: Never    Drug use: Never    Sexual activity: Not Currently     Partners: Male     Birth control/protection: Hysterectomy     Allergies   Allergen Reactions    Macrobid [Nitrofurantoin] GI Intolerance     Constipation, nausea, headache    Sulfa Antibiotics Hives     Other reaction(s): rash    Levofloxacin Rash       Current Outpatient Medications:     aspirin 81 MG EC tablet, Aspirin Low Dose 81 mg oral tablet,delayed release (DR/EC) take 1 tablet (81 mg) by oral route once daily   Active, Disp: , Rfl:     atorvastatin (LIPITOR) 20 MG tablet, Take 1 tablet by mouth Daily., Disp: 90 tablet, Rfl: 3    carvedilol (COREG) 6.25 MG tablet, Take 1 tablet by mouth 2 (Two) Times a Day., Disp: 180 tablet, Rfl: 3    escitalopram (LEXAPRO) 10 MG tablet, TAKE 1 TABLET EVERY DAY, Disp: 90 tablet, Rfl: 1    Estrogens Conjugated (Premarin) 0.625 MG/GM vaginal cream, Insert  into the vagina 2 (Two) Times a Week. Pea sized amount,  "Disp: 30 g, Rfl: 12    hydroCHLOROthiazide (HYDRODIURIL) 25 MG tablet, TAKE 1 TABLET EVERY DAY, Disp: 90 tablet, Rfl: 1    Insulin Aspart (novoLOG) 100 UNIT/ML injection, INJECT UP TO 65 UNITS VIA INSULIN PUMP DAILY, Disp: 20 mL, Rfl: 5    levothyroxine (SYNTHROID, LEVOTHROID) 75 MCG tablet, TAKE 1 TABLET BY MOUTH DAILY, Disp: 90 tablet, Rfl: 0    lisinopril (PRINIVIL,ZESTRIL) 40 MG tablet, TAKE 1 TABLET EVERY DAY, Disp: 90 tablet, Rfl: 1    pantoprazole (PROTONIX) 40 MG EC tablet, Take 1 tablet by mouth Daily., Disp: 90 tablet, Rfl: 3    Sodium Fluoride 5000 Sensitive 1.1-5 % gel, Use twice daily, Disp: , Rfl:     vitamin D3 (Vitamin D) 125 MCG (5000 UT) capsule capsule, , Disp: , Rfl:     Objective     Vitals:    01/15/24 0836   BP: 145/69   BP Location: Right arm   Patient Position: Sitting   Cuff Size: Adult   Pulse: 50   SpO2: 98%   Weight: 61.7 kg (136 lb)   Height: 157.5 cm (62\")   PainSc: 0-No pain     Body mass index is 24.87 kg/m².    Physical Exam  Constitutional:       Appearance: Normal appearance. She is normal weight.   HENT:      Head: Normocephalic and atraumatic.      Right Ear: External ear normal.      Left Ear: External ear normal.      Nose: Nose normal.   Eyes:      Extraocular Movements: Extraocular movements intact.      Conjunctiva/sclera: Conjunctivae normal.   Pulmonary:      Effort: Pulmonary effort is normal.   Musculoskeletal:         General: Normal range of motion.      Cervical back: Normal range of motion.   Skin:     General: Skin is warm and dry.   Neurological:      General: No focal deficit present.      Mental Status: She is alert and oriented to person, place, and time. Mental status is at baseline.   Psychiatric:         Mood and Affect: Mood normal.         Behavior: Behavior normal.         Thought Content: Thought content normal.         Judgment: Judgment normal.             Result Review :   The following data was reviewed by: JACKIE Qureshi on " 01/15/2024:    Most Recent A1C          1/15/2024    08:39   HGBA1C Most Recent   Hemoglobin A1C 7.3        A1C Last 3 Results          5/30/2023    07:05 8/25/2023    13:25 1/15/2024    08:39   HGBA1C Last 3 Results   Hemoglobin A1C 7.10  7  7.3      A1c collected in the office today is 7.3%, indicating Uncontrolled Type I diabetes.  This result is up from the prior result of 7.0% collected on 8/25/23     Glucose   Date Value Ref Range Status   01/15/2024 148 (H) 70 - 99 mg/dL Final     Comment:     Serial Number: [id number]:  [id number]     Point of care glucose in the office today is within normal limits for nonfasting glucose          Assessment: She has had an increase in her A1c which is most likely brought on by the use of steroids recently.  She is having postprandial hyperglycemia followed by hypoglycemia.  It was noted the patient is entering extra carbohydrates after her meal which she is not actually eating.  She is doing this to correct high glucose levels.  She is also sometimes bolusing late after her meals.  These actions are most likely the source of the hypoglycemia seen on the report today.  Patient was advised that she should hold carbohydrate boluses if it is greater than an hour after the meal or use half the carbohydrates if it is within 1/2 to 1-hour after the meal was eaten.      Diagnoses and all orders for this visit:    1. Uncontrolled type 1 diabetes mellitus with hyperglycemia (Primary)  -     POC Glycosylated Hemoglobin (Hb A1C)  -     POC Glucose    2. Type 1 diabetes with stage 2 chronic kidney disease GFR 60-89    3. 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 Insulin pump in place    5. Insulin pump titration    6. Uses self-applied continuous glucose monitoring device        Plan: Adjustments were made to her carbohydrate ratio to prevent postprandial hyperglycemia.  The 3 AM to 7 AM carbohydrate ratio of 1:14  was changed to 1-11 in the 11 AM to 3 PM carbohydrate ratio 1:20 was changed to 1-18.  Patient was advised not to take extra carbohydrate boluses to correct hyperglycemia but to allow the pump to do these corrections through the control IQ feature.  She was advised to contact the office if these changes cause hypoglycemia to occur.    The patient will monitor her blood glucose levels using the CGM.  If she develops problematic hyperglycemia or hypoglycemia or adverse drug reactions, she will contact the office for further instructions.        Follow Up     Return in about 3 months (around 4/15/2024) for Pump Eval, CGM Follow-up.    Patient was given instructions and counseling regarding her condition or for health maintenance advice. Please see specific information pulled into the AVS if appropriate.     Ame Key, APRN  01/15/2024      Dictated Utilizing Dragon Dictation.  Please note that portions of this note were completed with a voice recognition program.  Part of this note may be an electronic transcription/translation of spoken language to printed text using the Dragon Dictation System.

## 2024-01-15 ENCOUNTER — OFFICE VISIT (OUTPATIENT)
Dept: DIABETES SERVICES | Facility: HOSPITAL | Age: 74
End: 2024-01-15
Payer: MEDICARE

## 2024-01-15 VITALS
OXYGEN SATURATION: 98 % | BODY MASS INDEX: 25.03 KG/M2 | HEIGHT: 62 IN | SYSTOLIC BLOOD PRESSURE: 145 MMHG | HEART RATE: 50 BPM | WEIGHT: 136 LBS | DIASTOLIC BLOOD PRESSURE: 69 MMHG

## 2024-01-15 DIAGNOSIS — E10.65 UNCONTROLLED TYPE 1 DIABETES MELLITUS WITH HYPERGLYCEMIA: Primary | ICD-10-CM

## 2024-01-15 DIAGNOSIS — E10.3393 TYPE 1 DIABETES MELLITUS WITH MODERATE NONPROLIFERATIVE RETINOPATHY OF BOTH EYES WITHOUT MACULAR EDEMA: ICD-10-CM

## 2024-01-15 DIAGNOSIS — E10.22 TYPE 1 DIABETES WITH STAGE 2 CHRONIC KIDNEY DISEASE GFR 60-89: ICD-10-CM

## 2024-01-15 DIAGNOSIS — Z96.41 INSULIN PUMP IN PLACE: ICD-10-CM

## 2024-01-15 DIAGNOSIS — Z46.81 INSULIN PUMP TITRATION: ICD-10-CM

## 2024-01-15 DIAGNOSIS — Z97.8 USES SELF-APPLIED CONTINUOUS GLUCOSE MONITORING DEVICE: ICD-10-CM

## 2024-01-15 DIAGNOSIS — N18.2 TYPE 1 DIABETES WITH STAGE 2 CHRONIC KIDNEY DISEASE GFR 60-89: ICD-10-CM

## 2024-01-15 LAB
EXPIRATION DATE: ABNORMAL
GLUCOSE BLDC GLUCOMTR-MCNC: 148 MG/DL (ref 70–99)
HBA1C MFR BLD: 7.3 % (ref 4.5–5.7)
Lab: ABNORMAL

## 2024-01-15 PROCEDURE — G0463 HOSPITAL OUTPT CLINIC VISIT: HCPCS | Performed by: NURSE PRACTITIONER

## 2024-01-15 PROCEDURE — 82948 REAGENT STRIP/BLOOD GLUCOSE: CPT | Performed by: NURSE PRACTITIONER

## 2024-01-15 PROCEDURE — 1160F RVW MEDS BY RX/DR IN RCRD: CPT | Performed by: NURSE PRACTITIONER

## 2024-01-15 PROCEDURE — 3078F DIAST BP <80 MM HG: CPT | Performed by: NURSE PRACTITIONER

## 2024-01-15 PROCEDURE — 3077F SYST BP >= 140 MM HG: CPT | Performed by: NURSE PRACTITIONER

## 2024-01-15 PROCEDURE — 1159F MED LIST DOCD IN RCRD: CPT | Performed by: NURSE PRACTITIONER

## 2024-01-15 PROCEDURE — 99214 OFFICE O/P EST MOD 30 MIN: CPT | Performed by: NURSE PRACTITIONER

## 2024-01-15 PROCEDURE — 3051F HG A1C>EQUAL 7.0%<8.0%: CPT | Performed by: NURSE PRACTITIONER

## 2024-01-15 PROCEDURE — 95251 CONT GLUC MNTR ANALYSIS I&R: CPT | Performed by: NURSE PRACTITIONER

## 2024-02-05 RX ORDER — LEVOTHYROXINE SODIUM 0.07 MG/1
75 TABLET ORAL DAILY
Qty: 90 TABLET | Refills: 0 | OUTPATIENT
Start: 2024-02-05

## 2024-02-15 RX ORDER — HYDROCHLOROTHIAZIDE 25 MG/1
TABLET ORAL
Qty: 90 TABLET | Refills: 3 | Status: SHIPPED | OUTPATIENT
Start: 2024-02-15

## 2024-02-15 RX ORDER — LISINOPRIL 40 MG/1
TABLET ORAL
Qty: 90 TABLET | Refills: 3 | Status: SHIPPED | OUTPATIENT
Start: 2024-02-15

## 2024-04-04 NOTE — PROGRESS NOTES
Chief Complaint  Diabetes (Follow up, med mgt, A1c eval, cgm/pump eval )    Referred By: No ref. provider found    Subjective          Yohana GARCIA Vinny presents to [facility] for diabetes medication management    History of Present Illness    Visit type:  follow-up  Diabetes type:  Type 1  Current diabetes status/concerns/issues:  No concerns with her diabetes.  She is no longer working  Other health concerns:  She was having some fever blisters and headaches which she felt was due to the cardivedilol so she stopped it.  She also stopped the atorvastatin due to leg cramps  Current Diabetes symptoms:    Polyuria: No   Polydipsia: No   Polyphagia: No   Blurred vision: No   Excessive fatigue: No  Known Diabetes complications:  Neuropathy: None; Location: N/A  Renal: Stage II mild (GFR = 60-89 mL/min)  Eyes: Moderate Nonproliferative Retinopathy and Without Macular Edema; Location: Bilateral  Amputation/Wounds: None  GI: None  Cardiovascular: Hypertension and Hyperlipidemia  ED: N/A  Other: None  Hypoglycemia:  Level 1 hypoglycemia (54 mg/dL - 70 mg/dL); Frequency - 6% per CGM  Hypoglycemia Symptoms:  shaking/tremors  Current diabetes treatment:  Tandem insulin pump using NovoLog insulin. She is using approximately 30 units of insulin a day    Blood glucose device:  Dexcom CGM  Blood glucose monitoring frequency:  Continuous per CGM  Blood glucose range/average:   14-day glucose average is 145 with 71% of glucose levels found in target range between 80 and 180 while 23% are above target 6% are below target.  Glucose Source: Device Reviewed  Diet:  Carbohydrate Counting, Limits high carb/sweet foods, Avoids sugary drinks  Activity/Exercise:  None    Past Medical History:   Diagnosis Date    Anxiety     Cancer     Diabetes mellitus type I     GERD (gastroesophageal reflux disease)     History of thyroplasty 07/2021    Hyperlipidemia     Hypertension     Retinopathy     Thyroid disease       Past Surgical History:   Procedure Laterality Date    CARPAL TUNNEL RELEASE      CHOLECYSTECTOMY      COLONOSCOPY      ENDOSCOPY N/A 10/28/2021    Procedure: ESOPHAGOGASTRODUODENOSCOPY with biopsies;  Surgeon: Nadia Sanz MD;  Location: [facility] END[facility];  Service: Gastroenterology;  Laterality: N/A;  esophagitis and gastritis    HYSTERECTOMY      THYROID SURGERY      UPPER GASTROINTESTINAL ENDOSCOPY       Family History   Problem Relation Age of Onset    Diabetes type II Other     Cancer Mother     Cancer Father     Cancer Sister     Hypertension Brother     Diabetes Brother     Malig Hyperthermia Neg Hx      Social History     Socioeconomic History    Marital status:     Number of children: 2   Tobacco Use    Smoking status: Never    Smokeless tobacco: Never   Vaping Use    Vaping status: Never Used   Substance and Sexual Activity    Alcohol use: Never    Drug use: Never    Sexual activity: Not Currently     Partners: Male     Birth control/protection: Hysterectomy     Allergies   Allergen Reactions    Macrobid [Nitrofurantoin] GI Intolerance     Constipation, nausea, headache    Sulfa Antibiotics Hives     Other reaction(s): rash    Levofloxacin Rash       Current Outpatient Medications:     aspirin 81 MG EC tablet, Aspirin Low Dose 81 mg oral tablet,delayed release (DR/EC) take 1 tablet (81 mg) by oral route once daily   Active, Disp: , Rfl:     escitalopram (LEXAPRO) 10 MG tablet, TAKE 1 TABLET EVERY DAY, Disp: 90 tablet, Rfl: 1    Estrogens Conjugated (Premarin) 0.625 MG/GM vaginal cream, Insert  into the vagina 2 (Two) Times a Week. Pea sized amount, Disp: 30 g, Rfl: 12    hydroCHLOROthiazide 25 MG tablet, TAKE 1 TABLET EVERY DAY, Disp: 90 tablet, Rfl: 3    Insulin Aspart (novoLOG) 100 UNIT/ML injection, INJECT UP TO 65 UNITS VIA INSULIN PUMP DAILY, Disp: 20 mL, Rfl: 5    levothyroxine (SYNTHROID, LEVOTHROID) 75 MCG tablet, TAKE 1 TABLET BY MOUTH DAILY, Disp: 90 tablet, Rfl: 0     "lisinopril (PRINIVIL,ZESTRIL) 40 MG tablet, TAKE 1 TABLET EVERY DAY, Disp: 90 tablet, Rfl: 3    pantoprazole (PROTONIX) 40 MG EC tablet, Take 1 tablet by mouth Daily., Disp: 90 tablet, Rfl: 3    vitamin D3 (Vitamin D) 125 MCG (5000 UT) capsule capsule, , Disp: , Rfl:     Objective     Vitals:    04/12/24 1436   BP: 131/59   BP Location: Right arm   Patient Position: Sitting   Cuff Size: Adult   Pulse: 61   SpO2: 100%   Weight: 59 kg (130 lb)   Height: 157.5 cm (62\")   PainSc: 0-No pain     Body mass index is 23.78 kg/m².    Physical Exam  Constitutional:       Appearance: Normal appearance. She is normal weight.   HENT:      Head: Normocephalic and atraumatic.      Right Ear: External ear normal.      Left Ear: External ear normal.      Nose: Nose normal.   Eyes:      Extraocular Movements: Extraocular movements intact.      Conjunctiva/sclera: Conjunctivae normal.   Pulmonary:      Effort: Pulmonary effort is normal.   Musculoskeletal:         General: Normal range of motion.      Cervical back: Normal range of motion.   Skin:     General: Skin is warm and dry.   Neurological:      General: No focal deficit present.      Mental Status: She is alert and oriented to person, place, and time. Mental status is at baseline.   Psychiatric:         Mood and Affect: Mood normal.         Behavior: Behavior normal.         Thought Content: Thought content normal.         Judgment: Judgment normal.             Result Review :   The following data was reviewed by: JACKIE Qureshi on 04/12/2024:    Most Recent A1C          4/12/2024    14:46   HGBA1C Most Recent   Hemoglobin A1C 7.5        A1C Last 3 Results          8/25/2023    13:25 1/15/2024    08:39 4/12/2024    14:46   HGBA1C Last 3 Results   Hemoglobin A1C 7  7.3  7.5      A1c collected in the office today is 7.5%, indicating Uncontrolled Type I diabetes.  This result is up from the prior result of 7.3% collected on 1/15/24               Assessment: She has had " an increase in her A1c.  She is having some postprandial hyperglycemia.  Previously the patient needed high carbohydrate ratios to prevent hypoglycemia while she was at work.  Now she is retired and she is having the high glucose levels after meals because she is not as active.      Diagnoses and all orders for this visit:    1. Uncontrolled type 1 diabetes mellitus with hyperglycemia (Primary)  -     POC Glycosylated Hemoglobin (Hb A1C)  -     Insulin Aspart (novoLOG) 100 UNIT/ML injection; INJECT UP TO 65 UNITS VIA INSULIN PUMP DAILY  Dispense: 20 mL; Refill: 5    2. Type 1 diabetes with stage 2 chronic kidney disease GFR 60-89    3. 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 Insulin pump in place    5. Insulin pump titration    6. Uses self-applied continuous glucose monitoring device        Plan: To help with the postprandial hyperglycemia the 11 AM to 3 PM carbohydrate ratio 1:18 was reduced to 1-16 in the 5 PM to midnight carbohydrate ratio 1:18 was reduced to 1-16.  If she develops any hypoglycemia with these changes she will call the office.    The patient will monitor her blood glucose levels using her CGM.  If she develops problematic hyperglycemia or hypoglycemia or adverse drug reactions, she will contact the office for further instructions.        Follow Up     Return in about 3 months (around 7/12/2024) for Pump Eval, CGM Follow-up.    Patient was given instructions and counseling regarding her condition or for health maintenance advice. Please see specific information pulled into the AVS if appropriate.     Ame Key, JACKIE  04/12/2024      Dictated Utilizing Dragon Dictation.  Please note that portions of this note were completed with a voice recognition program.  Part of this note may be an electronic transcription/translation of spoken language to printed text using the Dragon Dictation System.

## 2024-04-12 ENCOUNTER — OFFICE VISIT (OUTPATIENT)
Dept: DIABETES SERVICES | Facility: CLINIC | Age: 74
End: 2024-04-12
Payer: MEDICARE

## 2024-04-12 VITALS
DIASTOLIC BLOOD PRESSURE: 59 MMHG | OXYGEN SATURATION: 100 % | HEART RATE: 61 BPM | WEIGHT: 130 LBS | BODY MASS INDEX: 23.92 KG/M2 | HEIGHT: 62 IN | SYSTOLIC BLOOD PRESSURE: 131 MMHG

## 2024-04-12 DIAGNOSIS — E10.65 UNCONTROLLED TYPE 1 DIABETES MELLITUS WITH HYPERGLYCEMIA: Primary | ICD-10-CM

## 2024-04-12 DIAGNOSIS — E10.3393 TYPE 1 DIABETES MELLITUS WITH MODERATE NONPROLIFERATIVE RETINOPATHY OF BOTH EYES WITHOUT MACULAR EDEMA: ICD-10-CM

## 2024-04-12 DIAGNOSIS — Z96.41 INSULIN PUMP IN PLACE: ICD-10-CM

## 2024-04-12 DIAGNOSIS — E10.22 TYPE 1 DIABETES WITH STAGE 2 CHRONIC KIDNEY DISEASE GFR 60-89: ICD-10-CM

## 2024-04-12 DIAGNOSIS — N18.2 TYPE 1 DIABETES WITH STAGE 2 CHRONIC KIDNEY DISEASE GFR 60-89: ICD-10-CM

## 2024-04-12 DIAGNOSIS — Z46.81 INSULIN PUMP TITRATION: ICD-10-CM

## 2024-04-12 DIAGNOSIS — Z97.8 USES SELF-APPLIED CONTINUOUS GLUCOSE MONITORING DEVICE: ICD-10-CM

## 2024-04-12 LAB
EXPIRATION DATE: ABNORMAL
HBA1C MFR BLD: 7.5 % (ref 4.5–5.7)
Lab: ABNORMAL

## 2024-04-12 PROCEDURE — 3078F DIAST BP <80 MM HG: CPT | Performed by: NURSE PRACTITIONER

## 2024-04-12 PROCEDURE — 95251 CONT GLUC MNTR ANALYSIS I&R: CPT | Performed by: NURSE PRACTITIONER

## 2024-04-12 PROCEDURE — 3051F HG A1C>EQUAL 7.0%<8.0%: CPT | Performed by: NURSE PRACTITIONER

## 2024-04-12 PROCEDURE — 99214 OFFICE O/P EST MOD 30 MIN: CPT | Performed by: NURSE PRACTITIONER

## 2024-04-12 PROCEDURE — 3075F SYST BP GE 130 - 139MM HG: CPT | Performed by: NURSE PRACTITIONER

## 2024-04-12 PROCEDURE — 1160F RVW MEDS BY RX/DR IN RCRD: CPT | Performed by: NURSE PRACTITIONER

## 2024-04-12 PROCEDURE — 1159F MED LIST DOCD IN RCRD: CPT | Performed by: NURSE PRACTITIONER

## 2024-04-12 RX ORDER — INSULIN ASPART 100 [IU]/ML
INJECTION, SOLUTION INTRAVENOUS; SUBCUTANEOUS
Qty: 20 ML | Refills: 5 | Status: SHIPPED | OUTPATIENT
Start: 2024-04-12

## 2024-04-29 ENCOUNTER — OFFICE VISIT (OUTPATIENT)
Dept: PODIATRY | Facility: CLINIC | Age: 74
End: 2024-04-29
Payer: MEDICARE

## 2024-04-29 VITALS
OXYGEN SATURATION: 96 % | DIASTOLIC BLOOD PRESSURE: 65 MMHG | WEIGHT: 129 LBS | TEMPERATURE: 97.5 F | BODY MASS INDEX: 23.59 KG/M2 | HEART RATE: 54 BPM | SYSTOLIC BLOOD PRESSURE: 144 MMHG

## 2024-04-29 DIAGNOSIS — Z79.4 INSULIN-REQUIRING OR DEPENDENT TYPE II DIABETES MELLITUS: Primary | ICD-10-CM

## 2024-04-29 DIAGNOSIS — E11.8 DM FEET: ICD-10-CM

## 2024-04-29 DIAGNOSIS — E11.9 INSULIN-REQUIRING OR DEPENDENT TYPE II DIABETES MELLITUS: Primary | ICD-10-CM

## 2024-04-29 PROCEDURE — 1159F MED LIST DOCD IN RCRD: CPT | Performed by: PODIATRIST

## 2024-04-29 PROCEDURE — G8404 LOW EXTEMITY NEUR EXAM DOCUM: HCPCS | Performed by: PODIATRIST

## 2024-04-29 PROCEDURE — 99213 OFFICE O/P EST LOW 20 MIN: CPT | Performed by: PODIATRIST

## 2024-04-29 PROCEDURE — 3078F DIAST BP <80 MM HG: CPT | Performed by: PODIATRIST

## 2024-04-29 PROCEDURE — 1160F RVW MEDS BY RX/DR IN RCRD: CPT | Performed by: PODIATRIST

## 2024-04-29 PROCEDURE — 3077F SYST BP >= 140 MM HG: CPT | Performed by: PODIATRIST

## 2024-04-29 NOTE — PROGRESS NOTES
[facility] - PODIATRY    Today's Date: 04/29/24    Patient Name: Yohana Casillas  MRN: [id number]  CSN: [id number]  PCP: Malu Marrero APRN, Last PCP Visit: 4/26/2024  Referring Provider: No ref. provider found    SUBJECTIVE     Chief Complaint   Patient presents with    Left Foot - Follow-up, Diabetes    Right Foot - Follow-up, Diabetes     HPI: Yohana Casillas, a 73 y.o.female, presents to clinic for a diabetic foot evaluation.    New, Established, New Problem: Established    Onset: Insidious    Nature: IDDM    Stable, worsening, improving: Stable    Patient controlling diabetes via: Insulin pumps with CGM    Patient states their most recent blood glucose reading was 189    Patient denies any fevers, chills, nausea, vomiting, shortness of breath, nor any other constitutional signs nor symptoms.    Medical changes: No changes.    I have reviewed/confirmed previously documented HPI with no changes.       Past Medical History:   Diagnosis Date    Anxiety     Cancer     Diabetes mellitus type I     GERD (gastroesophageal reflux disease)     History of thyroplasty 07/2021    Hyperlipidemia     Hypertension     Retinopathy     Thyroid disease      Past Surgical History:   Procedure Laterality Date    CARPAL TUNNEL RELEASE      CHOLECYSTECTOMY      COLONOSCOPY      ENDOSCOPY N/A 10/28/2021    Procedure: ESOPHAGOGASTRODUODENOSCOPY with biopsies;  Surgeon: Nadia Sanz MD;  Location: [facility] ENDOSCOPY;  Service: Gastroenterology;  Laterality: N/A;  esophagitis and gastritis    HYSTERECTOMY      THYROID SURGERY      UPPER GASTROINTESTINAL ENDOSCOPY       Family History   Problem Relation Age of Onset    Diabetes type II Other     Cancer Mother     Cancer Father     Cancer Sister     Hypertension Brother     Diabetes Brother     Malig Hyperthermia Neg Hx      Social History     Socioeconomic History    Marital status:     Number of children: 2   Tobacco Use    Smoking status: Never    Smokeless  tobacco: Never   Vaping Use    Vaping status: Never Used   Substance and Sexual Activity    Alcohol use: Never    Drug use: Never    Sexual activity: Not Currently     Partners: Male     Birth control/protection: Hysterectomy     Allergies   Allergen Reactions    Macrobid [Nitrofurantoin] GI Intolerance     Constipation, nausea, headache    Sulfa Antibiotics Hives     Other reaction(s): rash    Levofloxacin Rash     Current Outpatient Medications   Medication Sig Dispense Refill    aspirin 81 MG EC tablet Aspirin Low Dose 81 mg oral tablet,delayed release (DR/EC) take 1 tablet (81 mg) by oral route once daily   Active      escitalopram (LEXAPRO) 10 MG tablet TAKE 1 TABLET EVERY DAY 90 tablet 1    Estrogens Conjugated (Premarin) 0.625 MG/GM vaginal cream Insert  into the vagina 2 (Two) Times a Week. Pea sized amount 30 g 12    hydroCHLOROthiazide 25 MG tablet TAKE 1 TABLET EVERY DAY 90 tablet 3    Insulin Aspart (novoLOG) 100 UNIT/ML injection INJECT UP TO 65 UNITS VIA INSULIN PUMP DAILY 20 mL 5    levothyroxine (SYNTHROID, LEVOTHROID) 75 MCG tablet TAKE 1 TABLET BY MOUTH DAILY 90 tablet 0    lisinopril (PRINIVIL,ZESTRIL) 40 MG tablet TAKE 1 TABLET EVERY DAY 90 tablet 3    pantoprazole (PROTONIX) 40 MG EC tablet Take 1 tablet by mouth Daily. 90 tablet 3    vitamin D3 (Vitamin D) 125 MCG (5000 UT) capsule capsule        No current facility-administered medications for this visit.     Review of Systems   Constitutional: Negative.    All other systems reviewed and are negative.      OBJECTIVE     Vitals:    04/29/24 0742   BP: 144/65   Pulse: 54   Temp: 97.5 °F (36.4 °C)   SpO2: 96%       Body mass index is 23.59 kg/m².    Lab Results   Component Value Date    HGBA1C 7.5 (A) 04/12/2024       Lab Results   Component Value Date    GLUCOSE 115 (H) 05/30/2023    CALCIUM 9.6 05/30/2023     05/30/2023    K 4.1 05/30/2023    CO2 29.5 (H) 05/30/2023     05/30/2023    BUN 17 05/30/2023    CREATININE 0.78 05/30/2023     EGFRIFNONA 79 01/10/2022    BCR 21.8 05/30/2023    ANIONGAP 6.5 05/30/2023       Patient seen in no apparent distress.      PHYSICAL EXAM:     Foot/Ankle Exam    GENERAL  Diabetic foot exam performed    Appearance:  appears stated age  Orientation:  AAOx3  Affect:  appropriate  Gait:  unimpaired  Assistance:  independent  Right shoe gear: casual shoe  Left shoe gear: casual shoe    VASCULAR     Right Foot Vascularity   Dorsalis pedis:  1+  Posterior tibial:  1+  Skin temperature:  warm  Edema grading:  None  CFT:  < 3 seconds  Pedal hair growth:  Absent  Varicosities:  mild varicosities     Left Foot Vascularity   Dorsalis pedis:  1+  Posterior tibial:  1+  Skin temperature:  warm  Edema grading:  None  CFT:  < 3 seconds  Pedal hair growth:  Absent  Varicosities:  mild varicosities     NEUROLOGIC     Right Foot Neurologic   Normal sensation    Light touch sensation: normal  Vibratory sensation: normal  Hot/Cold sensation: normal  Protective Sensation using [place] Monofilament:   Sites intact: 10  Sites tested: 10     Left Foot Neurologic   Normal sensation    Light touch sensation: normal  Vibratory sensation: normal  Hot/Cold sensation:  normal  Protective Sensation using [place] Monofilament:   Sites intact: 10  Sites tested: 10    MUSCULOSKELETAL     Right Foot Musculoskeletal   Hallux valgus: Yes       Left Foot Musculoskeletal   Hallux valgus: Yes      MUSCLE STRENGTH     Right Foot Muscle Strength   Foot dorsiflexion:  4  Foot plantar flexion:  4  Foot inversion:  4  Foot eversion:  4     Left Foot Muscle Strength   Foot dorsiflexion:  4  Foot plantar flexion:  4  Foot inversion:  4  Foot eversion:  4    RANGE OF MOTION     Right Foot Range of Motion   Foot and ankle ROM within normal limits       Left Foot Range of Motion   Foot and ankle ROM within normal limits      DERMATOLOGIC      Right Foot Dermatologic   Skin  Right foot skin is intact.      Left Foot Dermatologic   Skin  Left  foot skin is intact.     Diabetic Foot Exam Performed and Monofilament Test Performed    I have reexamined the patient the results are consistent with the previously documented exam.  ASSESSMENT/PLAN     Diagnoses and all orders for this visit:    1. Insulin-requiring or dependent type II diabetes mellitus (Primary)    2. DM feet    Comprehensive lower extremity examination and evaluation was performed.    Discussed findings and treatment plan including risks, benefits, and treatment options with patient in detail. Patient agreed with treatment plan.    Medications and allergies reviewed.  Reviewed available blood glucose and HgB A1C lab values along with other pertinent labs.  These were discussed with the patient as to their importance of diabetic maintenance.    Diabetic foot exam performed and documented this date, compliant with CQM required standards. Detail of findings as noted in physical exam.  Lower extremity Neurologic exam for diabetic patient performed and documented this date, compliant with PQRS required standards. Detail of findings as noted in physical exam.  Advised patient importance of good routine lower extremity hygiene. Advised patient importance of evaluating for intact skin and pain free nail borders.  Advised patient to use mirror to evaluate plantar/ soles of feet for better visualization. Advised patient monitor and phone office to be seen if any cracking to skin, open lesions, painful nail borders or if nails become elongated prior to next visit. Advised patient importance of daily cleansing of lower extremities, followed by good skin cream to maintain normal hydration of skin. Also advised patient importance of close daily monitoring of blood sugar. Advised to regulate diet and medications to maintain control of blood sugar in optimal range. Contact primary care provider if difficulties maintaining blood sugar levels.  Advised Patient of presence of Diabetes Mellitus condition.  Advised  Patient risk of progression and worsening or improvement, then return of condition.  Will monitor condition for any change in future. Treat with most appropriate treatment pending status of condition.  Counseled and advised patient extensively on nature and ramifications of diabetes. Standard instructions given to patient for good diabetic foot care and maintenance. Advised importance of careful monitoring to avoid break down and complications secondary to diabetes. Advised patient importance of strict maintenance of blood sugar control. Advised patient of possible ominous results from neglect of condition, i.e.: amputation/ loss of digits, feet and legs, or even death.  Patient states understands counseling, will monitor closely, continue good hygiene and routine diabetic foot care. Patient will contact office is questions or problems.      An After Visit Summary was printed and given to the patient at discharge, including (if requested) any available informative/educational handouts regarding diagnosis, treatment, or medications. All questions were answered to patient/family satisfaction. Should symptoms fail to improve or worsen they agree to call or return to clinic or to go to the Emergency Department. Discussed the importance of following up with any needed screening tests/labs/specialist appointments and any requested follow-up recommended by me today. Importance of maintaining follow-up discussed and patient accepts that missed appointments can delay diagnosis and potentially lead to worsening of conditions.    Return in about 1 year (around 4/29/2025) for DFE., or sooner if acute issues arise.    I have reviewed the assessment and plan and verified the accuracy of it. No changes to assessment and plan since the information was documented. Tanner Green DPM 04/29/24     I have dictated this note utilizing Dragon Dictation.  Please note that portions of this note were completed with a voice recognition  program.  Part of this note may be an electronic transcription/translation of spoken language to printed text using the Dragon Dictation System.          This document has been electronically signed by Tanner Green DPM on April 29, 2024 07:58 EDT

## 2024-07-23 ENCOUNTER — OFFICE VISIT (OUTPATIENT)
Dept: DIABETES SERVICES | Facility: HOSPITAL | Age: 74
End: 2024-07-23
Payer: MEDICARE

## 2024-07-23 VITALS
OXYGEN SATURATION: 98 % | HEART RATE: 58 BPM | DIASTOLIC BLOOD PRESSURE: 66 MMHG | WEIGHT: 129 LBS | BODY MASS INDEX: 23.74 KG/M2 | HEIGHT: 62 IN | SYSTOLIC BLOOD PRESSURE: 106 MMHG

## 2024-07-23 DIAGNOSIS — E10.3393 TYPE 1 DIABETES MELLITUS WITH MODERATE NONPROLIFERATIVE RETINOPATHY OF BOTH EYES WITHOUT MACULAR EDEMA: ICD-10-CM

## 2024-07-23 DIAGNOSIS — E10.65 UNCONTROLLED TYPE 1 DIABETES MELLITUS WITH HYPERGLYCEMIA: ICD-10-CM

## 2024-07-23 DIAGNOSIS — E10.22 CONTROLLED TYPE 1 DIABETES MELLITUS WITH STAGE 2 CHRONIC KIDNEY DISEASE: Primary | ICD-10-CM

## 2024-07-23 DIAGNOSIS — Z96.41 INSULIN PUMP IN PLACE: ICD-10-CM

## 2024-07-23 DIAGNOSIS — N18.2 CONTROLLED TYPE 1 DIABETES MELLITUS WITH STAGE 2 CHRONIC KIDNEY DISEASE: Primary | ICD-10-CM

## 2024-07-23 DIAGNOSIS — Z97.8 USES SELF-APPLIED CONTINUOUS GLUCOSE MONITORING DEVICE: ICD-10-CM

## 2024-07-23 LAB
ALBUMIN UR-MCNC: <1.2 MG/DL
CREAT UR-MCNC: 65.6 MG/DL
EXPIRATION DATE: ABNORMAL
GLUCOSE BLDC GLUCOMTR-MCNC: 160 MG/DL (ref 70–99)
HBA1C MFR BLD: 6.6 % (ref 4.5–5.7)
Lab: ABNORMAL
MICROALBUMIN/CREAT UR: NORMAL MG/G{CREAT}

## 2024-07-23 PROCEDURE — 3044F HG A1C LEVEL LT 7.0%: CPT | Performed by: NURSE PRACTITIONER

## 2024-07-23 PROCEDURE — 82948 REAGENT STRIP/BLOOD GLUCOSE: CPT | Performed by: NURSE PRACTITIONER

## 2024-07-23 PROCEDURE — 82043 UR ALBUMIN QUANTITATIVE: CPT | Performed by: NURSE PRACTITIONER

## 2024-07-23 PROCEDURE — 95251 CONT GLUC MNTR ANALYSIS I&R: CPT | Performed by: NURSE PRACTITIONER

## 2024-07-23 PROCEDURE — 3074F SYST BP LT 130 MM HG: CPT | Performed by: NURSE PRACTITIONER

## 2024-07-23 PROCEDURE — 99214 OFFICE O/P EST MOD 30 MIN: CPT | Performed by: NURSE PRACTITIONER

## 2024-07-23 PROCEDURE — G0463 HOSPITAL OUTPT CLINIC VISIT: HCPCS | Performed by: NURSE PRACTITIONER

## 2024-07-23 PROCEDURE — 1160F RVW MEDS BY RX/DR IN RCRD: CPT | Performed by: NURSE PRACTITIONER

## 2024-07-23 PROCEDURE — 82570 ASSAY OF URINE CREATININE: CPT | Performed by: NURSE PRACTITIONER

## 2024-07-23 PROCEDURE — 3078F DIAST BP <80 MM HG: CPT | Performed by: NURSE PRACTITIONER

## 2024-07-23 PROCEDURE — 83036 HEMOGLOBIN GLYCOSYLATED A1C: CPT | Performed by: NURSE PRACTITIONER

## 2024-07-23 PROCEDURE — 1159F MED LIST DOCD IN RCRD: CPT | Performed by: NURSE PRACTITIONER

## 2024-07-23 RX ORDER — UBIDECARENONE 75 MG
50 CAPSULE ORAL DAILY
COMMUNITY

## 2024-07-23 RX ORDER — INSULIN ASPART 100 [IU]/ML
INJECTION, SOLUTION INTRAVENOUS; SUBCUTANEOUS
Qty: 20 ML | Refills: 5 | Status: SHIPPED | OUTPATIENT
Start: 2024-07-23

## 2024-07-23 NOTE — PROGRESS NOTES
Chief Complaint  Diabetes (Follow up, med mgt, A1c eval, cgm/pump eval, urine collected )    Referred By: JACKIE Perla presents to [facility] for insulin pump management    History of Present Illness    Visit type:  follow-up  Diabetes type:  Type 1  Current diabetes status/concerns/issues:  No concerns with her diabetes today  Other health concerns: She had fat tissue implanted on her vocal cords recently.  She has had some issues with diarrhea recently.  Current Diabetes symptoms:    Polyuria: No   Polydipsia: No   Polyphagia: No   Blurred vision: No   Excessive fatigue: No  Known Diabetes complications:  Neuropathy: None; Location: N/A  Renal: No current urine microalbumin available and Stage II mild (GFR = 60-89 mL/min)  Eyes: Moderate Nonproliferative Retinopathy and Without Macular Edema; Location: Bilateral  Amputation/Wounds: None  GI: None  Cardiovascular: Hypertension and Hyperlipidemia  ED: N/A  Other: None  Hypoglycemia:  Level 1 hypoglycemia (54 mg/dL - 70 mg/dL); Frequency - 1.6% per CGM and Level 2 (less than 54 mg/dL); Frequency - 0.5% per CGM report  Hypoglycemia Symptoms:  shaking/tremors and sweating  Current diabetes treatment:  Tandem insulin pump using NovoLog insulin. She is using approximately 30 units of insulin a day    Blood glucose device:  Dexcom CGM  Blood glucose monitoring frequency:  Continuous per CGM  Blood glucose range/average:  The 14-day sensor report shows an average glucose of 152 mg/dL, with 75% in target range ( mgdL), 18% in the high range (181-250 mg/dL), 4.5% in the very high range (>250 mg/dL), 1.6% in the low range (54-70 mg/dL) and 0.5% in the very low range (<54 mg/dL).   Glucose Source: Device Reviewed  Diet:  Carbohydrate Counting, Limits high carb/sweet foods, Avoids sugary drinks  Activity/Exercise:   walking some    Past Medical History:   Diagnosis Date    Anxiety     Cancer      Diabetes mellitus type I     GERD (gastroesophageal reflux disease)     History of thyroplasty 07/2021    Hyperlipidemia     Hypertension     Retinopathy     Thyroid disease      Past Surgical History:   Procedure Laterality Date    CARPAL TUNNEL RELEASE      CHOLECYSTECTOMY      COLONOSCOPY      ENDOSCOPY N/A 10/28/2021    Procedure: ESOPHAGOGASTRODUODENOSCOPY with biopsies;  Surgeon: Nadia Sanz MD;  Location: [facility] END[facility];  Service: Gastroenterology;  Laterality: N/A;  esophagitis and gastritis    HYSTERECTOMY      THYROID SURGERY      UPPER GASTROINTESTINAL ENDOSCOPY       Family History   Problem Relation Age of Onset    Diabetes type II Other     Cancer Mother     Cancer Father     Cancer Sister     Hypertension Brother     Diabetes Brother     Malig Hyperthermia Neg Hx      Social History     Socioeconomic History    Marital status:     Number of children: 2   Tobacco Use    Smoking status: Never    Smokeless tobacco: Never   Vaping Use    Vaping status: Never Used   Substance and Sexual Activity    Alcohol use: Never    Drug use: Never    Sexual activity: Not Currently     Partners: Male     Birth control/protection: Hysterectomy     Allergies   Allergen Reactions    Macrobid [Nitrofurantoin] GI Intolerance     Constipation, nausea, headache    Sulfa Antibiotics Hives     Other reaction(s): rash    Levofloxacin Rash       Current Outpatient Medications:     aspirin 81 MG EC tablet, Aspirin Low Dose 81 mg oral tablet,delayed release (DR/EC) take 1 tablet (81 mg) by oral route once daily   Active, Disp: , Rfl:     escitalopram (LEXAPRO) 10 MG tablet, TAKE 1 TABLET EVERY DAY, Disp: 90 tablet, Rfl: 1    Estrogens Conjugated (Premarin) 0.625 MG/GM vaginal cream, Insert  into the vagina 2 (Two) Times a Week. Pea sized amount, Disp: 30 g, Rfl: 12    hydroCHLOROthiazide 25 MG tablet, TAKE 1 TABLET EVERY DAY, Disp: 90 tablet, Rfl: 3    Insulin Aspart (novoLOG) 100 UNIT/ML injection, INJECT UP  "TO 67 UNITS VIA INSULIN PUMP DAILY, Disp: 20 mL, Rfl: 5    levothyroxine (SYNTHROID, LEVOTHROID) 75 MCG tablet, TAKE 1 TABLET BY MOUTH DAILY, Disp: 90 tablet, Rfl: 0    lisinopril (PRINIVIL,ZESTRIL) 40 MG tablet, TAKE 1 TABLET EVERY DAY, Disp: 90 tablet, Rfl: 3    pantoprazole (PROTONIX) 40 MG EC tablet, Take 1 tablet by mouth Daily., Disp: 90 tablet, Rfl: 3    vitamin B-12 (cyanocobalamin) 100 MCG tablet, Take 0.5 tablets by mouth Daily., Disp: , Rfl:     vitamin D3 (Vitamin D) 125 MCG (5000 UT) capsule capsule, , Disp: , Rfl:     Objective     Vitals:    07/23/24 0915   BP: 106/66   Pulse: 58   SpO2: 98%   Weight: 58.5 kg (129 lb)   Height: 157.5 cm (62\")   PainSc: 0-No pain     Body mass index is 23.59 kg/m².    Physical Exam  Constitutional:       Appearance: Normal appearance. She is normal weight.   HENT:      Head: Normocephalic and atraumatic.      Right Ear: External ear normal.      Left Ear: External ear normal.      Nose: Nose normal.   Eyes:      Extraocular Movements: Extraocular movements intact.      Conjunctiva/sclera: Conjunctivae normal.   Pulmonary:      Effort: Pulmonary effort is normal.   Musculoskeletal:         General: Normal range of motion.      Cervical back: Normal range of motion.   Skin:     General: Skin is warm and dry.   Neurological:      General: No focal deficit present.      Mental Status: She is alert and oriented to person, place, and time. Mental status is at baseline.   Psychiatric:         Mood and Affect: Mood normal.         Behavior: Behavior normal.         Thought Content: Thought content normal.         Judgment: Judgment normal.             Result Review :   The following data was reviewed by: JACKIE Qureshi on 07/23/2024:    Most Recent A1C          7/23/2024    09:24   HGBA1C Most Recent   Hemoglobin A1C 6.6        A1C Last 3 Results          1/15/2024    08:39 4/12/2024    14:46 7/23/2024    09:24   HGBA1C Last 3 Results   Hemoglobin A1C 7.3  7.5  6.6  "     A1c collected in the office today is 6.6%, indicating Controlled Type I diabetes.  This result is down from the prior result of 7.5% collected on 4/12/24     Glucose   Date Value Ref Range Status   07/23/2024 160 (H) 70 - 99 mg/dL Final     Comment:     Serial Number: [id number]:  [id number]     Point of care glucose in the office today is within normal limits for nonfasting glucose            Assessment: She has had significant improvement in her A1c and is now in a controlled status.  This is without problematic hypoglycemia.  She does have occasional episodes of hypoglycemia but this is generally brought on by late bolusing after her meal.  The patient was instructed on the appropriate bolusing process for delayed meal bolusing.  She does have some postprandial hyperglycemia because of the same reason.      Diagnoses and all orders for this visit:    1. Controlled type 1 diabetes mellitus with stage 2 chronic kidney disease (Primary)  -     Microalbumin / Creatinine Urine Ratio - Urine, Clean Catch; Future  -     POC Glycosylated Hemoglobin (Hb A1C)  -     POC Glucose  -     Insulin Aspart (novoLOG) 100 UNIT/ML injection; INJECT UP TO 67 UNITS VIA INSULIN PUMP DAILY  Dispense: 20 mL; Refill: 5    2. 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 Insulin pump in place    4. Uses self-applied continuous glucose monitoring device        Plan: No changes were made to the pump settings.  The patient is to focus on the timeliness of her mealtime bolus to minimize risk for both hyperglycemia and hypoglycemia.  Urine microalbumin was collected in the office today.    The patient will monitor her blood glucose levels using her CGM.  If she develops problematic hyperglycemia or hypoglycemia or adverse drug reactions, she will contact the office for further instructions.        Follow Up     Return in about 3 months (around 10/23/2024) for Pump Eval,  CGM Follow-up.    Patient was given instructions and counseling regarding her condition or for health maintenance advice. Please see specific information pulled into the AVS if appropriate.     Ame Key, APRN  07/23/2024      Dictated Utilizing Dragon Dictation.  Please note that portions of this note were completed with a voice recognition program.  Part of this note may be an electronic transcription/translation of spoken language to printed text using the Dragon Dictation System.

## 2024-09-27 NOTE — PROGRESS NOTES
Patient: TULIO WATSON     Acct: [id number]     Report: #[id number]  UNIT #: [id number]     : 1950    Encounter Date:10/16/2018  PRIMARY CARE: EDUIN DEL CASTILLO  ***Signed***  --------------------------------------------------------------------------------------------------------------------  Encounter Date      Oct 16, 2018            Reason for Visit      This patient is seen in the office today for follow-up evaluation for insulin     pump management.  She is a 67-year-old female patient with a history of type 1     diabetes.  She is currently managed on a tandem insulin pump with continuous     glucose sensor.  Her insulin pump was uploaded for 14-day period of time and the    records were reviewed with the patient.            The pump record indicates an average glucose of 200 mg/dL with a high of 410     mg/dL and a low of 64 mg/dL.  This is based on testing 3-4 times each day.  She     is using approximately 30 units of insulin each day.  There were 2 episodes of     hypoglycemia during this 14-day period of time.  An A1c collected this month was    7.4% which is slightly increased up from 7.1% in  of this year.            Additionally the patient is wearing a Dexcom personal continuous glucose sensor     which since the results to her pump.  The sensor report indicates an average     sensor reading of 177 mg/dL with a high of 400 and a low of 40.  The sensor     indicates 3% glucose levels are in a range of hypoglycemia.  In review of the     sensor data there is clearly a pattern of postprandial hyperglycemia after each     meal of the day.  The patient does have hypoglycemia occurring in the mid     morning between the hours of 8 AM and 11 AM.  The patient states that this is     usually her busiest time of day at work.            History            History: She denies any health issues or changes since last being seen.            Allergies/Medications      Allergies:        Coded Allergies:              SULFA (SULFONAMIDE ANTIBIOTICS) (Verified  Allergy, Unknown, RASH,     12/18/14)      Medications    Last Reconciled on 10/19/18 09:46 by JASPREET BATEMAN      Lactobacillus Rhamnosus  (Probiotic Digestive Care) 1 Each Capsule      1 EACH PO, CAP         Reported         1/5/18       Multivitamin (Multivitamins) 1 Each Capsule      1 EACH PO QDAY, CAP         Reported         1/5/18       Escitalopram Oxalate (Lexapro) 5 Mg Tablet      10 MG PO QDAY, TAB         Reported         1/5/18       Insulin Human Aspart (NovoLOG VIAL) 100 Unit/1 Ml Vial      35 UNITS SUBQ QDAY, #1 VIAL 0 Refills         Reported         1/5/18       Estrogens Conjugated (Premarin Vag Cream) 30 Gm Cream.appl      1 APL VAG Q 3rd Day, #1 TUBE         Reported         1/5/18       Pantoprazole (Protonix*) 20 Mg Tablet.dr      40 MG PO QDAY PRN for acid reflux, TAB         Reported         1/5/18       amLODIPine (amLODIPine) 10 Mg Tablet      10 MG PO QDAY, #30 TAB 0 Refills         Reported         1/5/18       Ranitidine HCl (Zantac*) 150 Mg Tablet      150 MG PO BID, #60 TAB 5 Refills         Prov: Lewis Burger         12/18/14       Artificial Tears (Systane) 1 Drop Drops      1 DROPS EYE EACH BID, ML         Reported         12/17/14       Lisinopril* (Lisinopril*) 40 Mg Tablet      40 MG PO QDAY, #30 TAB 0 Refills         Reported         12/17/14       Levothyroxine Sodium (Levoxyl*) 0.075 Mg Tablet      1 TAB PO QAM         Reported         12/16/11       Simvastatin (Zocor) 20 Mg Tablet      1 TAB PO QDAY         Reported         12/16/11            Quality Measures      Current yr A1c result 7-9%:  Yes      Neg ua mAlb this yr in chart:  Yes            Impression      Type 1 diabetes uncontrolled            Plan            At this time to minimize the risk for hypoglycemia in the early morning hours     the 7 AM basal rate of 0.375 was decreased to 0.35 and at the 11 AM basal rate     of 0.425 was decreased to 0.40;  to prevent the postprandial hyperglycemia the 12    AM, 11 AM and 3 PM carbohydrate ratios of 1-28 were changed to 1-25 and at the 4    PM and 5 PM carbohydrate ratios of 1-18 were changed to 1-15            The patient was cautioned to watch for postprandial hypoglycemia very carefully.     She is to especially pay attention to the hypoglycemia in the morning hours to     see if the reduction in basal rate does correct that issue.  She will be     scheduled for routine follow-up in 3 months but she will call our office if the     changes made today produce any problematic hypoglycemia.            Total time spent with patient was 10 minutes of which half of that time was     spent counseling the patient regarding hypoglycemia prevention and management            Patient Education      Yes            PREVENTION      Hx Influenza Vaccination:  Yes (2014)      Influenza Vaccine Declined:  No (The patient states she will be getting her flu     shot at her upcoming appointment with her PCP next month)      2 or More Falls Past Year?:  No      Fall Past Year with Injury?:  No      Hx Pneumococcal Vaccination:  Yes      Encouraged to follow-up with:  PCP regarding preventative exams.                 Disclaimer: Converted document may not contain table formatting or lab diagrams. Please see [employer] for the authenticated document.   [FreeTextEntry1] : Dizziness   intermittent dizziness , without exertional symptoms , negative EP study , no evidence of immediate orthostasis , however patient has severe AS , however based on sequence of symptoms less likely cause of her symptoms , patient was advised to follow up with EP ,  review telemetry , showed episodes of marked bradycardia , brief PSVT , likely tachy cem causing her symptoms   encourage patient to hydrate ,   resume taking losartan 25 mg po daily ,   Patient who was having recurrent epistaxis , controlled on low dose eliquis  as patient has recurrent epistaxis will continue ,eliquis 2.5 mg po BID for now , follow up with ENT  Hx of PAF currently in sinus rhythm tachy cem syndrome with resolved symptoms after changing medication , avoid negative chronotropic drugs , continue losartan , hydralazine eliquis  Hypertension uncontrolled ,normal home BP continue low salt diet and  give  losartan 25  mg po daily asymmetric BP UE ?? right more than left. home BP monitor    severe Aortic stenosis with murmur/ carotid bruits : worsened AS gradient compared to prior study , continue to monitor , mild carotid disease   no significant change from prior   Hyperlipidemia improved compared to prior , with evidence of coronary calcification suggestive coronary atherosclerosis ( no evidence of ischemia on stress test ) , target LDL <70, continue red yeast rice ,   Body aches ? arthritis improved aches on holding lipitor,  Obesity prior hx of sleep apnea , patient was encouraged to loose more weight  COPD pulmonary nodule stable continue current medication ,recently treated pneumonia , follow up with pulmonary     follow up after 3 months.

## 2024-10-07 ENCOUNTER — OFFICE VISIT (OUTPATIENT)
Dept: GASTROENTEROLOGY | Facility: CLINIC | Age: 74
End: 2024-10-07
Payer: MEDICARE

## 2024-10-07 VITALS
HEIGHT: 62 IN | WEIGHT: 128.3 LBS | HEART RATE: 50 BPM | DIASTOLIC BLOOD PRESSURE: 65 MMHG | SYSTOLIC BLOOD PRESSURE: 150 MMHG | BODY MASS INDEX: 23.61 KG/M2

## 2024-10-07 DIAGNOSIS — Z12.11 ENCOUNTER FOR SCREENING FOR MALIGNANT NEOPLASM OF COLON: ICD-10-CM

## 2024-10-07 DIAGNOSIS — K21.9 GASTROESOPHAGEAL REFLUX DISEASE, UNSPECIFIED WHETHER ESOPHAGITIS PRESENT: Primary | ICD-10-CM

## 2024-10-07 PROCEDURE — 3077F SYST BP >= 140 MM HG: CPT | Performed by: NURSE PRACTITIONER

## 2024-10-07 PROCEDURE — 99214 OFFICE O/P EST MOD 30 MIN: CPT | Performed by: NURSE PRACTITIONER

## 2024-10-07 PROCEDURE — 1160F RVW MEDS BY RX/DR IN RCRD: CPT | Performed by: NURSE PRACTITIONER

## 2024-10-07 PROCEDURE — G2211 COMPLEX E/M VISIT ADD ON: HCPCS | Performed by: NURSE PRACTITIONER

## 2024-10-07 PROCEDURE — 1159F MED LIST DOCD IN RCRD: CPT | Performed by: NURSE PRACTITIONER

## 2024-10-07 PROCEDURE — 3078F DIAST BP <80 MM HG: CPT | Performed by: NURSE PRACTITIONER

## 2024-10-07 RX ORDER — PANTOPRAZOLE SODIUM 40 MG/1
40 TABLET, DELAYED RELEASE ORAL DAILY
Qty: 90 TABLET | Refills: 3 | Status: SHIPPED | OUTPATIENT
Start: 2024-10-07

## 2024-10-07 RX ORDER — SODIUM, POTASSIUM,MAG SULFATES 17.5-3.13G
2 SOLUTION, RECONSTITUTED, ORAL ORAL ONCE
Qty: 354 ML | Refills: 0 | Status: SHIPPED | OUTPATIENT
Start: 2024-10-07 | End: 2024-10-07

## 2024-10-07 RX ORDER — DIPHENOXYLATE HYDROCHLORIDE AND ATROPINE SULFATE 2.5; .025 MG/1; MG/1
TABLET ORAL
COMMUNITY
Start: 2024-09-01

## 2024-10-07 RX ORDER — SIMVASTATIN 20 MG
20 TABLET ORAL NIGHTLY
COMMUNITY

## 2024-10-07 RX ORDER — FAMOTIDINE 40 MG/1
40 TABLET, FILM COATED ORAL
Qty: 90 TABLET | Refills: 3 | Status: SHIPPED | OUTPATIENT
Start: 2024-10-07

## 2024-10-07 NOTE — PROGRESS NOTES
Chief Complaint     Heartburn and Diarrhea    History of Present Illness     Yohana Casillas is a 73 y.o. female who presents to [facility] for follow-up of heartburn and diarrhea.      She reports diarrhea that was present 3-4 weeks.  It is resolved now.  Denies rectal bleeding.      She is taking protonix 40 mg each morning.  States that she's continuing to experience reflux and heartburn at night.  Denies nausea.       History      Past Medical History:   Diagnosis Date    Anxiety     Cancer     Diabetes mellitus type I     GERD (gastroesophageal reflux disease)     History of thyroplasty 07/2021    Hyperlipidemia     Hypertension     Retinopathy     Thyroid disease      Past Surgical History:   Procedure Laterality Date    CARPAL TUNNEL RELEASE      CHOLECYSTECTOMY      COLONOSCOPY      ENDOSCOPY N/A 10/28/2021    Procedure: ESOPHAGOGASTRODUODENOSCOPY with biopsies;  Surgeon: Nadia Sanz MD;  Location: [facility] ENDOSCOPY;  Service: Gastroenterology;  Laterality: N/A;  esophagitis and gastritis    HYSTERECTOMY      THYROID SURGERY      UPPER GASTROINTESTINAL ENDOSCOPY       Family History   Problem Relation Age of Onset    Diabetes type II Other     Cancer Mother     Cancer Father     Cancer Sister     Hypertension Brother     Diabetes Brother     Malig Hyperthermia Neg Hx         Current Medications       Current Outpatient Medications:     aspirin 81 MG EC tablet, Aspirin Low Dose 81 mg oral tablet,delayed release (DR/EC) take 1 tablet (81 mg) by oral route once daily   Active, Disp: , Rfl:     escitalopram (LEXAPRO) 10 MG tablet, TAKE 1 TABLET EVERY DAY, Disp: 90 tablet, Rfl: 1    hydroCHLOROthiazide 25 MG tablet, TAKE 1 TABLET EVERY DAY, Disp: 90 tablet, Rfl: 3    Insulin Aspart (novoLOG) 100 UNIT/ML injection, INJECT UP TO 67 UNITS VIA INSULIN PUMP DAILY, Disp: 20 mL, Rfl: 5    levothyroxine (SYNTHROID, LEVOTHROID) 75 MCG tablet, TAKE 1 TABLET BY MOUTH DAILY, Disp:  "90 tablet, Rfl: 0    lisinopril (PRINIVIL,ZESTRIL) 40 MG tablet, TAKE 1 TABLET EVERY DAY, Disp: 90 tablet, Rfl: 3    multivitamin (Multi-Vitamin Daily) tablet tablet, , Disp: , Rfl:     pantoprazole (PROTONIX) 40 MG EC tablet, Take 1 tablet by mouth Daily., Disp: 90 tablet, Rfl: 3    simvastatin (ZOCOR) 20 MG tablet, Take 1 tablet by mouth Every Night., Disp: , Rfl:     vitamin B-12 (cyanocobalamin) 100 MCG tablet, Take 0.5 tablets by mouth Daily., Disp: , Rfl:     vitamin D3 (Vitamin D) 125 MCG (5000 UT) capsule capsule, , Disp: , Rfl:     famotidine (Pepcid) 40 MG tablet, Take 1 tablet by mouth every night at bedtime., Disp: 90 tablet, Rfl: 3    sodium-potassium-magnesium sulfates (SUPREP) 17.5-3.13-1.6 GM/177ML solution oral solution, Take 2 bottles by mouth 1 (One) Time for 1 dose. Take as directed, Disp: 354 mL, Rfl: 0     Allergies     Allergies   Allergen Reactions    Macrobid [Nitrofurantoin] GI Intolerance     Constipation, nausea, headache    Sulfa Antibiotics Hives     Other reaction(s): rash    Levofloxacin Rash       Social History       Social History     Social History Narrative    , 2 adult child, lives at home with           Objective       /65 (BP Location: Left arm, Patient Position: Sitting, Cuff Size: Adult)   Pulse 50   Ht 157.5 cm (62.01\")   Wt 58.2 kg (128 lb 4.8 oz)   BMI 23.46 kg/m²       Physical Exam  Constitutional:       General: She is not in acute distress.     Appearance: Normal appearance. She is well-developed and normal weight.   HENT:      Head: Normocephalic and atraumatic.   Eyes:      Conjunctiva/sclera: Conjunctivae normal.      Pupils: Pupils are equal, round, and reactive to light.      Visual Fields: Right eye visual fields normal and left eye visual fields normal.   Cardiovascular:      Rate and Rhythm: Normal rate.   Pulmonary:      Effort: Pulmonary effort is normal. No respiratory distress or retractions.      Breath sounds: Normal air entry. "   Abdominal:      General: There is no distension.      Palpations: Abdomen is soft.      Tenderness: There is no abdominal tenderness.   Musculoskeletal:         General: Normal range of motion.      Right lower leg: No edema.      Left lower leg: No edema.   Skin:     General: Skin is warm and dry.      Findings: No lesion.   Neurological:      General: No focal deficit present.      Mental Status: She is alert and oriented to person, place, and time.   Psychiatric:         Mood and Affect: Mood and affect normal.         Behavior: Behavior normal.         Results       Result Review :    The following data was reviewed by: JACKIE Thomas on 10/07/2024:    9/9/2024 CBC: Hemoglobin 11.8, hematocrit 34.8, platelets 251.  CMP: Creatinine 0.82, alk phos 77, AST 21, ALT 16, total bilirubin 0.2.  Iron profile: Iron 61, iron saturation 23%.  Ferritin 163.               Assessment and Plan              Diagnoses and all orders for this visit:    1. Gastroesophageal reflux disease, unspecified whether esophagitis present (Primary)  -     pantoprazole (PROTONIX) 40 MG EC tablet; Take 1 tablet by mouth Daily.  Dispense: 90 tablet; Refill: 3  -     famotidine (Pepcid) 40 MG tablet; Take 1 tablet by mouth every night at bedtime.  Dispense: 90 tablet; Refill: 3    2. Encounter for screening for malignant neoplasm of colon  -     Case Request; Standing  -     Case Request    Other orders  -     Follow Anesthesia Guidelines / Protocol; Standing  -     Follow Anesthesia Guidelines / Protocol; Future  -     Obtain Informed Consent; Future  -     sodium-potassium-magnesium sulfates (SUPREP) 17.5-3.13-1.6 GM/177ML solution oral solution; Take 2 bottles by mouth 1 (One) Time for 1 dose. Take as directed  Dispense: 354 mL; Refill: 0  -     Verify NPO; Standing  -     Verify Bowel Prep Was Successful; Standing  -     Give Tap Water Enema If Bowel Prep Insufficient; Standing  -     Obtain Informed Consent;  Standing          COLONOSCOPY FOR SCREENING (N/A)Surgical Risk and Benefits discussed: Possible risks/complications, benefits, and alternatives to surgical or invasive procedure have been explained to patient and/or legal guardian; risks include bleeding, infection, and perforation. Patient has been evaluated and can tolerate anesthesia and/or sedation. Risks, benefits, and alternatives to anesthesia and sedation have been explained to patient and/or legal guardian.        Follow Up     Follow Up   Return in about 9 months (around 7/7/2025) for GERD.  Patient was given instructions and counseling regarding her condition or for health maintenance advice. Please see specific information pulled into the AVS if appropriate.

## 2024-10-13 DIAGNOSIS — N18.2 CONTROLLED TYPE 1 DIABETES MELLITUS WITH STAGE 2 CHRONIC KIDNEY DISEASE: ICD-10-CM

## 2024-10-13 DIAGNOSIS — E10.22 CONTROLLED TYPE 1 DIABETES MELLITUS WITH STAGE 2 CHRONIC KIDNEY DISEASE: ICD-10-CM

## 2024-10-14 RX ORDER — INSULIN ASPART 100 [IU]/ML
INJECTION, SOLUTION INTRAVENOUS; SUBCUTANEOUS
Qty: 20 ML | Refills: 2 | Status: SHIPPED | OUTPATIENT
Start: 2024-10-14

## 2024-10-28 ENCOUNTER — OFFICE VISIT (OUTPATIENT)
Dept: DIABETES SERVICES | Facility: HOSPITAL | Age: 74
End: 2024-10-28
Payer: MEDICARE

## 2024-10-28 VITALS
TEMPERATURE: 98.1 F | HEIGHT: 62 IN | BODY MASS INDEX: 24.11 KG/M2 | HEART RATE: 60 BPM | WEIGHT: 131 LBS | DIASTOLIC BLOOD PRESSURE: 70 MMHG | SYSTOLIC BLOOD PRESSURE: 115 MMHG | OXYGEN SATURATION: 98 %

## 2024-10-28 DIAGNOSIS — Z97.8 USES SELF-APPLIED CONTINUOUS GLUCOSE MONITORING DEVICE: ICD-10-CM

## 2024-10-28 DIAGNOSIS — E10.3393 TYPE 1 DIABETES MELLITUS WITH MODERATE NONPROLIFERATIVE RETINOPATHY OF BOTH EYES WITHOUT MACULAR EDEMA: ICD-10-CM

## 2024-10-28 DIAGNOSIS — Z96.41 INSULIN PUMP IN PLACE: ICD-10-CM

## 2024-10-28 DIAGNOSIS — N18.2 CONTROLLED TYPE 1 DIABETES MELLITUS WITH STAGE 2 CHRONIC KIDNEY DISEASE: Primary | ICD-10-CM

## 2024-10-28 DIAGNOSIS — E10.22 CONTROLLED TYPE 1 DIABETES MELLITUS WITH STAGE 2 CHRONIC KIDNEY DISEASE: Primary | ICD-10-CM

## 2024-10-28 LAB
EXPIRATION DATE: ABNORMAL
GLUCOSE BLDC GLUCOMTR-MCNC: 157 MG/DL (ref 70–99)
HBA1C MFR BLD: 6.7 % (ref 4.5–5.7)
Lab: ABNORMAL

## 2024-10-28 PROCEDURE — 3074F SYST BP LT 130 MM HG: CPT | Performed by: NURSE PRACTITIONER

## 2024-10-28 PROCEDURE — G0463 HOSPITAL OUTPT CLINIC VISIT: HCPCS | Performed by: NURSE PRACTITIONER

## 2024-10-28 PROCEDURE — 1159F MED LIST DOCD IN RCRD: CPT | Performed by: NURSE PRACTITIONER

## 2024-10-28 PROCEDURE — 1160F RVW MEDS BY RX/DR IN RCRD: CPT | Performed by: NURSE PRACTITIONER

## 2024-10-28 PROCEDURE — 3044F HG A1C LEVEL LT 7.0%: CPT | Performed by: NURSE PRACTITIONER

## 2024-10-28 PROCEDURE — 95251 CONT GLUC MNTR ANALYSIS I&R: CPT | Performed by: NURSE PRACTITIONER

## 2024-10-28 PROCEDURE — 82948 REAGENT STRIP/BLOOD GLUCOSE: CPT | Performed by: NURSE PRACTITIONER

## 2024-10-28 PROCEDURE — 3078F DIAST BP <80 MM HG: CPT | Performed by: NURSE PRACTITIONER

## 2024-10-28 PROCEDURE — 99214 OFFICE O/P EST MOD 30 MIN: CPT | Performed by: NURSE PRACTITIONER

## 2024-10-28 PROCEDURE — 83036 HEMOGLOBIN GLYCOSYLATED A1C: CPT | Performed by: NURSE PRACTITIONER

## 2024-10-28 RX ORDER — INSULIN ASPART 100 [IU]/ML
67 INJECTION, SOLUTION INTRAVENOUS; SUBCUTANEOUS DAILY
Qty: 60 ML | Refills: 1 | Status: SHIPPED | OUTPATIENT
Start: 2024-10-28 | End: 2025-04-26

## 2024-10-28 NOTE — PROGRESS NOTES
Chief Complaint  Diabetes    Referred By: JACKIE Perla presents to [facility] for insulin pump management    History of Present Illness    Visit type:  follow-up  Diabetes type:  Type 1  Current diabetes status/concerns/issues:  Having some highs and lows  Other health concerns:  She has had problems with diarrhea for 6 weeks now.  She is scheduled for a colonoscopy in December  Current Diabetes symptoms:    Polyuria: No   Polydipsia: No   Polyphagia: No   Blurred vision: No   Excessive fatigue: No  Known Diabetes complications:  Neuropathy: None; Location: N/A  Renal: Stage II mild (GFR = 60-89 mL/min) and Microalbuminuria - NEGATIVE  Eyes: Moderate Nonproliferative Retinopathy and Without Macular Edema; Location: Bilateral  Amputation/Wounds: None  GI: None  Cardiovascular: Hypertension and Hyperlipidemia  ED: N/A  Other: None  Hypoglycemia:  Level 1 hypoglycemia (54 mg/dL - 70 mg/dL); Frequency - 2.0% per CGM and Level 2 (less than 54 mg/dL); Frequency - 0.9% per CGM report  Hypoglycemia Symptoms:  shaking/tremors  Current diabetes treatment:  Tandem insulin pump using NovoLog insulin. She is using approximately 30 units of insulin a day    Blood glucose device:  Dexcom CGM  Blood glucose monitoring frequency:  Continuous per CGM  Blood glucose range/average:  The 14-day sensor report shows an average glucose of 158 mg/dL, with 69% in target range ( mgdL), 20% in the high range (181-250 mg/dL), 8.1% in the very high range (>250 mg/dL), 2.0% in the low range (54-70 mg/dL) and 0.9% in the very low range (<54 mg/dL).   Glucose Source: Device Reviewed  Diet:  Carbohydrate Counting, Limits high carb/sweet foods, Avoids sugary drinks  Activity/Exercise:   She has not been able to walk as usual due to her GI issues    Past Medical History:   Diagnosis Date    Anxiety     Cancer     Diabetes mellitus type I     GERD (gastroesophageal reflux  disease)     History of thyroplasty 07/2021    Hyperlipidemia     Hypertension     Retinopathy     Thyroid disease      Past Surgical History:   Procedure Laterality Date    CARPAL TUNNEL RELEASE      CHOLECYSTECTOMY      COLONOSCOPY      ENDOSCOPY N/A 10/28/2021    Procedure: ESOPHAGOGASTRODUODENOSCOPY with biopsies;  Surgeon: Nadia Sanz MD;  Location: [facility] END[facility]COPY;  Service: Gastroenterology;  Laterality: N/A;  esophagitis and gastritis    HYSTERECTOMY      THYROID SURGERY      UPPER GASTROINTESTINAL ENDOSCOPY       Family History   Problem Relation Age of Onset    Diabetes type II Other     Cancer Mother     Cancer Father     Cancer Sister     Hypertension Brother     Diabetes Brother     Malig Hyperthermia Neg Hx      Social History     Socioeconomic History    Marital status:     Number of children: 2   Tobacco Use    Smoking status: Never    Smokeless tobacco: Never   Vaping Use    Vaping status: Never Used   Substance and Sexual Activity    Alcohol use: Never    Drug use: Never    Sexual activity: Not Currently     Partners: Male     Birth control/protection: Hysterectomy     Allergies   Allergen Reactions    Macrobid [Nitrofurantoin] GI Intolerance     Constipation, nausea, headache    Sulfa Antibiotics Hives     Other reaction(s): rash    Levofloxacin Rash       Current Outpatient Medications:     aspirin 81 MG EC tablet, Aspirin Low Dose 81 mg oral tablet,delayed release (DR/EC) take 1 tablet (81 mg) by oral route once daily   Active, Disp: , Rfl:     escitalopram (LEXAPRO) 10 MG tablet, TAKE 1 TABLET EVERY DAY, Disp: 90 tablet, Rfl: 1    famotidine (Pepcid) 40 MG tablet, Take 1 tablet by mouth every night at bedtime., Disp: 90 tablet, Rfl: 3    hydroCHLOROthiazide 25 MG tablet, TAKE 1 TABLET EVERY DAY, Disp: 90 tablet, Rfl: 3    levothyroxine (SYNTHROID, LEVOTHROID) 75 MCG tablet, TAKE 1 TABLET BY MOUTH DAILY, Disp: 90 tablet, Rfl: 0    lisinopril (PRINIVIL,ZESTRIL) 40 MG tablet,  "TAKE 1 TABLET EVERY DAY, Disp: 90 tablet, Rfl: 3    multivitamin (Multi-Vitamin Daily) tablet tablet, , Disp: , Rfl:     NovoLOG 100 UNIT/ML injection, Inject 67 Units under the skin into the appropriate area as directed Daily for 180 days., Disp: 60 mL, Rfl: 1    pantoprazole (PROTONIX) 40 MG EC tablet, Take 1 tablet by mouth Daily., Disp: 90 tablet, Rfl: 3    simvastatin (ZOCOR) 20 MG tablet, Take 1 tablet by mouth Every Night., Disp: , Rfl:     vitamin B-12 (cyanocobalamin) 100 MCG tablet, Take 0.5 tablets by mouth Daily., Disp: , Rfl:     vitamin D3 (Vitamin D) 125 MCG (5000 UT) capsule capsule, , Disp: , Rfl:     Objective     Vitals:    10/28/24 1444   BP: 115/70   BP Location: Right arm   Patient Position: Sitting   Cuff Size: Adult   Pulse: 60   Temp: 98.1 °F (36.7 °C)   TempSrc: Temporal   SpO2: 98%   Weight: 59.4 kg (131 lb)   Height: 157.5 cm (62.01\")     Body mass index is 23.95 kg/m².    Physical Exam  Constitutional:       Appearance: Normal appearance. She is normal weight.   HENT:      Head: Normocephalic and atraumatic.      Right Ear: External ear normal.      Left Ear: External ear normal.      Nose: Nose normal.   Eyes:      Extraocular Movements: Extraocular movements intact.      Conjunctiva/sclera: Conjunctivae normal.   Pulmonary:      Effort: Pulmonary effort is normal.   Musculoskeletal:         General: Normal range of motion.      Cervical back: Normal range of motion.   Skin:     General: Skin is warm and dry.   Neurological:      General: No focal deficit present.      Mental Status: She is alert and oriented to person, place, and time. Mental status is at baseline.   Psychiatric:         Mood and Affect: Mood normal.         Behavior: Behavior normal.         Thought Content: Thought content normal.         Judgment: Judgment normal.             Result Review :   The following data was reviewed by: JACKIE Qureshi on 10/28/2024:    Most Recent A1C          10/28/2024    14:47 "   HGBA1C Most Recent   Hemoglobin A1C 6.7        A1C Last 3 Results          4/12/2024    14:46 7/23/2024    09:24 10/28/2024    14:47   HGBA1C Last 3 Results   Hemoglobin A1C 7.5  6.6  6.7      A1c collected in the office today is 6.7%, indicating Controlled Type I diabetes.  This result is up from the prior result of 6.6% collected on 7/23/2024    Glucose   Date Value Ref Range Status   10/28/2024 157 (H) 70 - 99 mg/dL Final     Comment:     Serial Number: [id number]:  [id number]     Point of care glucose in the office today is within normal limits for nonfasting glucose      Microalbumin, Urine   Date Value Ref Range Status   07/23/2024 <1.2 mg/dL Final   05/30/2023 <1.2 mg/dL Final     Creatinine, Urine   Date Value Ref Range Status   07/23/2024 65.6 mg/dL Final   05/30/2023 111.3 mg/dL Final     Microalbumin/Creatinine Ratio   Date Value Ref Range Status   07/23/2024   Final     Comment:     Unable to calculate   05/30/2023   Final     Comment:     Unable to calculate     Urine microalbuminuria collected on 7/23/24 is negative for microalbuminuria             Assessment: Her A1c continues to show well-controlled type 1 diabetes.  She does have some intermittent episodes of hypoglycemia.  Most of these events appear to occur after the patient has entered her carbohydrate bolus after the meal instead of before the meal.  She also has some hyperglycemia that appears to be occurring if she misses a meal bolus or if she is bolusing too soon before the meal.      Diagnoses and all orders for this visit:    1. Controlled type 1 diabetes mellitus with stage 2 chronic kidney disease (Primary)  -     POC Glycosylated Hemoglobin (Hb A1C)  -     NovoLOG 100 UNIT/ML injection; Inject 67 Units under the skin into the appropriate area as directed Daily for 180 days.  Dispense: 60 mL; Refill: 1    2. 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 Insulin pump  in place    4. Uses self-applied continuous glucose monitoring device    Other orders  -     POC Glucose        Plan: No changes were made to the pump settings today.  The patient will focus on the timeliness of her carbohydrate intake and into the pump to minimize the risk of both hypoglycemia and hyperglycemia.  She was advised that if its 30 minutes or less after the meal to only enter half the carbohydrates into the pump but if it is greater than an hour she is not to enter any carbohydrates in the pump to minimize the risk of hypoglycemia.    The patient will monitor her blood glucose levels using the CGM.  If she develops problematic hyperglycemia or hypoglycemia or adverse drug reactions, she will contact the office for further instructions.        Follow Up     Return in about 3 months (around 1/28/2025) for CGM Follow-up, Pump Eval.    Patient was given instructions and counseling regarding her condition or for health maintenance advice. Please see specific information pulled into the AVS if appropriate.     Ame Key, JACKIE  10/28/2024      Dictated Utilizing Dragon Dictation.  Please note that portions of this note were completed with a voice recognition program.  Part of this note may be an electronic transcription/translation of spoken language to printed text using the Dragon Dictation System.

## 2024-11-22 ENCOUNTER — TELEPHONE (OUTPATIENT)
Dept: GASTROENTEROLOGY | Facility: CLINIC | Age: 74
End: 2024-11-22
Payer: MEDICARE

## 2024-12-02 NOTE — PRE-PROCEDURE INSTRUCTIONS
"PAT call attempted.  No answer.  Detailed message with date and arrival time of 0700 given.  Instructed that arrival time is not procedure time but allows time to prepare for procedure. Come to entrance \"C\"; must have adult  for transportation home; may have two visitors; however, children under 12 must remain in waiting area; instructed on diet/clear liquids/NPO/bowel prep, if needed; may take normal meds two hours prior to arrival time except for blood thinners, antidiabetics, diuretics, and weight loss meds.  Instructed to return call to confirm receipt of instructions and for any questions.  Cardiac clearance noted in chart.  " [Negative] : Heme/Lymph

## 2024-12-06 ENCOUNTER — ANESTHESIA EVENT (OUTPATIENT)
Dept: GASTROENTEROLOGY | Facility: HOSPITAL | Age: 74
End: 2024-12-06
Payer: MEDICARE

## 2024-12-06 NOTE — ANESTHESIA PREPROCEDURE EVALUATION
Anesthesia Evaluation     Nursing notes reviewed   NPO Solid Status: > 8 hours  NPO Liquid Status: > 2 hours           Airway   Mallampati: II  TM distance: >3 FB  No difficulty expected  Dental    (+) upper dentures and partials    Comment: Upper dentures, lower partial     Pulmonary - normal exam    breath sounds clear to auscultation  (+) ,shortness of breath  Cardiovascular - normal exam    ECG reviewed  Rhythm: regular  Rate: normal    (+) hypertension, valvular problems/murmurs, hyperlipidemia    ROS comment: Echocardiogram Findings 12/11/21  Left Ventricle Calculated left ventricular EF = 62%   Normal left ventricular cavity size and wall thickness noted. All left ventricular wall segments contract normally.  Right Ventricle Normal right ventricular cavity size, wall thickness, systolic function and septal motion noted.  Left Atrium Normal left atrial size and volume noted.  Right Atrium Normal right atrial cavity size noted. The diameter of the inferior vena cava is 1.8 cm.  Aortic Valve The aortic valve is abnormal in structure. There is calcification of the aortic valve. Mild aortic valve regurgitation is present. No aortic valve stenosis is present.  Mitral Valve There is calcification of the mitral valve. Mild mitral valve regurgitation is present. No significant mitral valve stenosis is present.  Tricuspid Valve The tricuspid valve is structurally normal with no significant stenosis present. Trace tricuspid valve regurgitation is present.  Pulmonic Valve The pulmonic valve is structurally normal with no regurgitation or significant stenosis present.  Greater Vessels No dilation of the aortic root is present. Aortic root = 2.4 cm  Pericardium The pericardium is normal. There is no evidence of pericardial effusion. .        Neuro/Psych  (+) psychiatric history  GI/Hepatic/Renal/Endo    (+) GERD, diabetes mellitus (insulin pump turned off by patient in pre-op) using insulin, thyroid problem hypothyroidism  "   Musculoskeletal     Abdominal    Substance History      OB/GYN          Other      history of cancer    ROS/Med Hx Other: ECHO 12/1/21 -- Interpretation Summary  -Fibrocalcific mitral and aortic valves.  -Normal left ventricular systolic function.  -Mild MR.  -Mild TR.  -Mild AI.  -Calculated left ventricular EF = 62%     From office visit with Nadia Dawn 11/12/24: \"EKG reviewed by me and shows sinus bradycardia with rate of 52. When compared to EKG from 5/7/2024, there are no significant changes.\"    Pt stated she required additional supplemental O2 s/p vocal cord injection 8/2024 at [facility]. No hx of other anesthetic complications.       Phys Exam Other: Pt has some residual noted hoarseness when speaking after vocal cord procedure at [facility] 8/2024.                  Anesthesia Plan    ASA 3     general   total IV anesthesia  (Total IV Anesthesia    Patient understands anesthesia not responsible for dental damage.  )  intravenous induction     Anesthetic plan, risks, benefits, and alternatives have been provided, discussed and informed consent has been obtained with: patient.    Plan discussed with CRNA.        CODE STATUS:         "

## 2024-12-09 ENCOUNTER — ANESTHESIA (OUTPATIENT)
Dept: GASTROENTEROLOGY | Facility: HOSPITAL | Age: 74
End: 2024-12-09
Payer: MEDICARE

## 2024-12-09 ENCOUNTER — HOSPITAL ENCOUNTER (OUTPATIENT)
Facility: HOSPITAL | Age: 74
Setting detail: HOSPITAL OUTPATIENT SURGERY
Discharge: HOME | End: 2024-12-09
Attending: INTERNAL MEDICINE | Admitting: INTERNAL MEDICINE
Payer: MEDICARE

## 2024-12-09 VITALS
SYSTOLIC BLOOD PRESSURE: 125 MMHG | HEART RATE: 56 BPM | WEIGHT: 124.78 LBS | RESPIRATION RATE: 15 BRPM | BODY MASS INDEX: 22.82 KG/M2 | OXYGEN SATURATION: 95 % | TEMPERATURE: 97.1 F | DIASTOLIC BLOOD PRESSURE: 55 MMHG

## 2024-12-09 PROBLEM — N95.2 ATROPHIC VAGINITIS: Chronic | Status: ACTIVE | Noted: 2021-10-18

## 2024-12-09 LAB
GLUCOSE BLDC GLUCOMTR-MCNC: 116 MG/DL (ref 70–99)
GLUCOSE BLDC GLUCOMTR-MCNC: 225 MG/DL (ref 70–99)
GLUCOSE BLDC GLUCOMTR-MCNC: 76 MG/DL (ref 70–99)

## 2024-12-09 PROCEDURE — 82948 REAGENT STRIP/BLOOD GLUCOSE: CPT | Performed by: NURSE ANESTHETIST, CERTIFIED REGISTERED

## 2024-12-09 PROCEDURE — 82948 REAGENT STRIP/BLOOD GLUCOSE: CPT

## 2024-12-09 PROCEDURE — 25010000002 LIDOCAINE PF 2% 2 % SOLUTION: Performed by: NURSE ANESTHETIST, CERTIFIED REGISTERED

## 2024-12-09 PROCEDURE — 25010000002 PROPOFOL 10 MG/ML EMULSION: Performed by: NURSE ANESTHETIST, CERTIFIED REGISTERED

## 2024-12-09 PROCEDURE — G0121 COLON CA SCRN NOT HI RSK IND: HCPCS | Performed by: INTERNAL MEDICINE

## 2024-12-09 RX ORDER — SODIUM CHLORIDE, SODIUM LACTATE, POTASSIUM CHLORIDE, CALCIUM CHLORIDE 600; 310; 30; 20 MG/100ML; MG/100ML; MG/100ML; MG/100ML
30 INJECTION, SOLUTION INTRAVENOUS CONTINUOUS
Status: DISCONTINUED | OUTPATIENT
Start: 2024-12-09 | End: 2024-12-09 | Stop reason: HOSPADM

## 2024-12-09 RX ORDER — DEXTROSE MONOHYDRATE 25 G/50ML
25 INJECTION, SOLUTION INTRAVENOUS ONCE
Status: COMPLETED | OUTPATIENT
Start: 2024-12-09 | End: 2024-12-09

## 2024-12-09 RX ORDER — LISINOPRIL 40 MG/1
TABLET ORAL
Qty: 90 TABLET | Refills: 3 | Status: SHIPPED | OUTPATIENT
Start: 2024-12-09

## 2024-12-09 RX ORDER — LIDOCAINE HYDROCHLORIDE 20 MG/ML
INJECTION, SOLUTION EPIDURAL; INFILTRATION; INTRACAUDAL; PERINEURAL AS NEEDED
Status: DISCONTINUED | OUTPATIENT
Start: 2024-12-09 | End: 2024-12-09 | Stop reason: SURG

## 2024-12-09 RX ORDER — CARVEDILOL 6.25 MG/1
6.25 TABLET ORAL DAILY
COMMUNITY

## 2024-12-09 RX ORDER — PROPOFOL 10 MG/ML
VIAL (ML) INTRAVENOUS AS NEEDED
Status: DISCONTINUED | OUTPATIENT
Start: 2024-12-09 | End: 2024-12-09 | Stop reason: SURG

## 2024-12-09 RX ORDER — DEXTROSE MONOHYDRATE 25 G/50ML
INJECTION, SOLUTION INTRAVENOUS
Status: DISCONTINUED
Start: 2024-12-09 | End: 2024-12-09 | Stop reason: HOSPADM

## 2024-12-09 RX ORDER — FLUTICASONE PROPIONATE 50 MCG
2 SPRAY, SUSPENSION (ML) NASAL DAILY
COMMUNITY

## 2024-12-09 RX ORDER — HYDROCHLOROTHIAZIDE 25 MG/1
TABLET ORAL
Qty: 90 TABLET | Refills: 3 | Status: SHIPPED | OUTPATIENT
Start: 2024-12-09

## 2024-12-09 RX ADMIN — LIDOCAINE HYDROCHLORIDE 40 MG: 20 INJECTION, SOLUTION INTRAVENOUS at 08:22

## 2024-12-09 RX ADMIN — PROPOFOL 50 MG: 10 INJECTION, EMULSION INTRAVENOUS at 08:22

## 2024-12-09 RX ADMIN — DEXTROSE MONOHYDRATE 25 ML: 25 INJECTION, SOLUTION INTRAVENOUS at 08:54

## 2024-12-09 RX ADMIN — PROPOFOL 250 MCG/KG/MIN: 10 INJECTION, EMULSION INTRAVENOUS at 08:22

## 2024-12-09 NOTE — H&P
Pre Procedure History & Physical    Chief Complaint:   Screening colonoscopy    Subjective     HPI:   73 yo F here for screening colonoscopy.    Past Medical History:   Past Medical History:   Diagnosis Date    Anxiety     Cancer     Diabetes mellitus type I     GERD (gastroesophageal reflux disease)     History of thyroplasty 07/2021    Hyperlipidemia     Hypertension     Retinopathy     Thyroid disease        Past Surgical History:  Past Surgical History:   Procedure Laterality Date    CARPAL TUNNEL RELEASE      CHOLECYSTECTOMY      COLONOSCOPY      ENDOSCOPY N/A 10/28/2021    Procedure: ESOPHAGOGASTRODUODENOSCOPY with biopsies;  Surgeon: Nadia Sanz MD;  Location: [facility] ENDOSCOPY;  Service: Gastroenterology;  Laterality: N/A;  esophagitis and gastritis    HYSTERECTOMY      THYROID SURGERY      UPPER GASTROINTESTINAL ENDOSCOPY         Family History:  Family History   Problem Relation Age of Onset    Diabetes type II Other     Cancer Mother     Cancer Father     Cancer Sister     Hypertension Brother     Diabetes Brother     Malig Hyperthermia Neg Hx        Social History:   reports that she has never smoked. She has never used smokeless tobacco. She reports that she does not drink alcohol and does not use drugs.    Medications:   Medications Prior to Admission   Medication Sig Dispense Refill Last Dose/Taking    aspirin 81 MG EC tablet Aspirin Low Dose 81 mg oral tablet,delayed release (DR/EC) take 1 tablet (81 mg) by oral route once daily   Active       escitalopram (LEXAPRO) 10 MG tablet TAKE 1 TABLET EVERY DAY 90 tablet 1     famotidine (Pepcid) 40 MG tablet Take 1 tablet by mouth every night at bedtime. 90 tablet 3     hydroCHLOROthiazide 25 MG tablet TAKE 1 TABLET EVERY DAY 90 tablet 3     levothyroxine (SYNTHROID, LEVOTHROID) 75 MCG tablet TAKE 1 TABLET BY MOUTH DAILY 90 tablet 0     lisinopril (PRINIVIL,ZESTRIL) 40 MG tablet TAKE 1 TABLET EVERY DAY 90 tablet 3     multivitamin (Multi-Vitamin  Daily) tablet tablet        NovoLOG 100 UNIT/ML injection Inject 67 Units under the skin into the appropriate area as directed Daily for 180 days. 60 mL 1     pantoprazole (PROTONIX) 40 MG EC tablet Take 1 tablet by mouth Daily. 90 tablet 3     simvastatin (ZOCOR) 20 MG tablet Take 1 tablet by mouth Every Night.       vitamin B-12 (cyanocobalamin) 100 MCG tablet Take 0.5 tablets by mouth Daily.       vitamin D3 (Vitamin D) 125 MCG (5000 UT) capsule capsule           Allergies:  Macrobid [nitrofurantoin], Sulfa antibiotics, and Levofloxacin    ROS:    Pertinent items are noted in HPI     Objective     Weight 56.6 kg (124 lb 12.5 oz), not currently breastfeeding.    Physical Exam   Constitutional: Pt is oriented to person, place, and time and well-developed, well-nourished, and in no distress.   Mouth/Throat: Oropharynx is clear and moist.   Neck: Normal range of motion.   Cardiovascular: Normal rate, regular rhythm and normal heart sounds.    Pulmonary/Chest: Effort normal and breath sounds normal.   Abdominal: Soft. Nontender  Skin: Skin is warm and dry.   Psychiatric: Mood, memory, affect and judgment normal.     Assessment & Plan     Diagnosis:  Screening colonoscopy    Anticipated Surgical Procedure:  Colonoscopy    The risks, benefits, and alternatives of this procedure have been discussed with the patient or the responsible party- the patient understands and agrees to proceed.

## 2024-12-09 NOTE — ANESTHESIA POSTPROCEDURE EVALUATION
Patient: Yohana Casillas    Procedure Summary       Date: 12/09/24 Room / Location: [facility] ENDOSCOPY 1 / [facility] ENDOSCOPY    Anesthesia Start: 0821 Anesthesia Stop: 0842    Procedure: COLONOSCOPY FOR SCREENING Diagnosis:       Encounter for screening for malignant neoplasm of colon      (Encounter for screening for malignant neoplasm of colon [Z12.11])    Surgeons: Nadia Sanz MD Provider: Kathy East CRNA    Anesthesia Type: general ASA Status: 3            Anesthesia Type: general    Vitals  Vitals Value Taken Time   /55 12/09/24 0901   Temp 36.2 °C (97.1 °F) 12/09/24 0850   Pulse 62 12/09/24 0903   Resp 15 12/09/24 0900   SpO2 95 % 12/09/24 0903   Vitals shown include unfiled device data.        Post Anesthesia Care and Evaluation    Patient location during evaluation: bedside  Patient participation: complete - patient participated  Level of consciousness: awake  Pain management: adequate    Airway patency: patent  Anesthetic complications: No anesthetic complications  PONV Status: controlled  Cardiovascular status: acceptable and stable  Respiratory status: acceptable    Comments: Patient's glucose low upon arrival to recovery. Patient given D50 IV. Glucose improved and patient discharged.

## 2024-12-10 ENCOUNTER — TELEPHONE (OUTPATIENT)
Dept: GASTROENTEROLOGY | Facility: CLINIC | Age: 74
End: 2024-12-10
Payer: MEDICARE

## 2024-12-10 NOTE — LETTER
December 10, 2024    Yohana Casillas  [address] [facility] [place] [address]        12/10/2024         Yohana Zuritas   [address] [facility] [place] [address]            Dear Yohana Sanz MD performed a(n) Colonoscopy on December 9, 2024; Your biopsy results were benign.  If applicable, please refer to the pathology and/or procedure report for more detailed information via [facility] or by obtaining a copy of the reports from our office. We recommend that you have a repeat Colonoscopy in 10 years.    A colonoscopy is the best way to screen for colon polyps and colon cancer, however despite careful evaluation, small polyps can sometimes be missed. If you should develop any bleeding, abdominal pain, weight loss, change in bowel habits, or any other GI complications, please inform our office as soon as possible.      Additionally, Nadia Sanz MD recommends you adopt the following healthy habits to lower your risk of future polyps and colon cancer:    Exercise regularly.  Do not smoke or consume alcohol.  Limit red meat intake.  Include ample fruits and vegetables in your diet.    If you have any questions regarding your procedure or results, please do not hesitate to contact our office.    Sincerely,        [facility] [place]

## 2025-01-27 ENCOUNTER — OFFICE VISIT (OUTPATIENT)
Dept: DIABETES SERVICES | Facility: HOSPITAL | Age: 75
End: 2025-01-27
Payer: MEDICARE

## 2025-01-27 VITALS
OXYGEN SATURATION: 100 % | SYSTOLIC BLOOD PRESSURE: 150 MMHG | WEIGHT: 132.9 LBS | HEART RATE: 54 BPM | DIASTOLIC BLOOD PRESSURE: 71 MMHG | BODY MASS INDEX: 24.46 KG/M2 | HEIGHT: 62 IN

## 2025-01-27 DIAGNOSIS — E10.22 TYPE 1 DIABETES WITH STAGE 2 CHRONIC KIDNEY DISEASE GFR 60-89: ICD-10-CM

## 2025-01-27 DIAGNOSIS — Z96.41 INSULIN PUMP IN PLACE: ICD-10-CM

## 2025-01-27 DIAGNOSIS — Z46.81 INSULIN PUMP TITRATION: ICD-10-CM

## 2025-01-27 DIAGNOSIS — E10.3393 TYPE 1 DIABETES MELLITUS WITH MODERATE NONPROLIFERATIVE RETINOPATHY OF BOTH EYES WITHOUT MACULAR EDEMA: ICD-10-CM

## 2025-01-27 DIAGNOSIS — E10.65 UNCONTROLLED TYPE 1 DIABETES MELLITUS WITH HYPERGLYCEMIA: Primary | ICD-10-CM

## 2025-01-27 DIAGNOSIS — N18.2 TYPE 1 DIABETES WITH STAGE 2 CHRONIC KIDNEY DISEASE GFR 60-89: ICD-10-CM

## 2025-01-27 LAB
EXPIRATION DATE: ABNORMAL
GLUCOSE BLDC GLUCOMTR-MCNC: 209 MG/DL (ref 70–99)
HBA1C MFR BLD: 7.2 % (ref 4.5–5.7)
Lab: ABNORMAL

## 2025-01-27 PROCEDURE — 1160F RVW MEDS BY RX/DR IN RCRD: CPT | Performed by: NURSE PRACTITIONER

## 2025-01-27 PROCEDURE — 82948 REAGENT STRIP/BLOOD GLUCOSE: CPT | Performed by: NURSE PRACTITIONER

## 2025-01-27 PROCEDURE — 3078F DIAST BP <80 MM HG: CPT | Performed by: NURSE PRACTITIONER

## 2025-01-27 PROCEDURE — 3051F HG A1C>EQUAL 7.0%<8.0%: CPT | Performed by: NURSE PRACTITIONER

## 2025-01-27 PROCEDURE — 3077F SYST BP >= 140 MM HG: CPT | Performed by: NURSE PRACTITIONER

## 2025-01-27 PROCEDURE — 95251 CONT GLUC MNTR ANALYSIS I&R: CPT | Performed by: NURSE PRACTITIONER

## 2025-01-27 PROCEDURE — 83036 HEMOGLOBIN GLYCOSYLATED A1C: CPT | Performed by: NURSE PRACTITIONER

## 2025-01-27 PROCEDURE — 99214 OFFICE O/P EST MOD 30 MIN: CPT | Performed by: NURSE PRACTITIONER

## 2025-01-27 PROCEDURE — 1159F MED LIST DOCD IN RCRD: CPT | Performed by: NURSE PRACTITIONER

## 2025-01-27 PROCEDURE — G0463 HOSPITAL OUTPT CLINIC VISIT: HCPCS | Performed by: NURSE PRACTITIONER

## 2025-01-27 NOTE — PROGRESS NOTES
Chief Complaint  Diabetes (Follow up, medmgt, A1c eval, CGM/Pump eval)    Referred By: JACKIE Perla    Subjective          Patient or patient representative verbalized consent for the use of Ambient Listening during the visit with  JACKIE Qureshi for chart documentation. 1/27/2025  13:30 KATJA Casillas presents to [facility] for insulin pump management    History of Present Illness    Visit type:  follow-up  Diabetes type:  Type 1  Current diabetes status/concerns/issues:     History of Present Illness  The patient presents for evaluation of diabetes.    She reports no significant concerns related to her diabetes today. She has been experiencing hypoglycemic episodes during physical activities such as cleaning her granddaughter's house, which she attributes to forgetting to switch her insulin pump to exercise mode. She experiences symptoms of sweating and general malaise during these hypoglycemic episodes. She maintains a diet low in carbohydrates and avoids sugary beverages. She has been less active due to cold weather conditions, which she believes may have contributed to her elevated A1c levels. She typically engages in daily walks for exercise.     She has an upcoming routine appointment with her cardiologist.    She recently had an eye examination at [facility], where some bleeding was noted on the outer edges of her eyes. A follow-up appointment is scheduled in 3 months. She was informed that this could potentially be related to her carotid arteries.        Current Diabetes symptoms:    Polyuria: No   Polydipsia: No   Polyphagia: No   Blurred vision: No   Excessive fatigue: No  Known Diabetes complications:  Neuropathy: None; Location: N/A  Renal: Stage II mild (GFR = 60-89 mL/min) and Microalbuminuria - NEGATIVE  Eyes: Moderate Nonproliferative Retinopathy and Without Macular Edema; Location: Bilateral  Amputation/Wounds: None  GI:  None  Cardiovascular: Hypertension and Hyperlipidemia  ED: N/A  Other: None  Hypoglycemia:  Level 1 hypoglycemia (54 mg/dL - 70 mg/dL); Frequency - 1.6% per CGM report and Level 2 (less than 54 mg/dL); Frequency - 0.8% per CGM  Hypoglycemia Symptoms:  sweating  Current diabetes treatment:  Tandem insulin pump using NovoLog insulin. She is using approximately 30 units of insulin a day    Blood glucose device:  Dexcom CGM  Blood glucose monitoring frequency:  Continuous per CGM  Blood glucose range/average:  The 14-day sensor report shows an average glucose of 162 mg/dL, with 66% in target range ( mgdL), 23% in the high range (181-250 mg/dL), 8.3% in the very high range (>250 mg/dL), 1.6% in the low range (54-70 mg/dL) and 0.8% in the very low range (<54 mg/dL).   Glucose Source: Device Reviewed  Diet:  Carbohydrate Counting, Limits high carb/sweet foods, Avoids sugary drinks  Activity/Exercise:   some activity with house work    Past Medical History:   Diagnosis Date    Anxiety     Cancer     basal cell    Diabetes mellitus type I     GERD (gastroesophageal reflux disease)     History of thyroplasty 07/2021    Hyperlipidemia     Hypertension     Retinopathy     Thyroid disease      Past Surgical History:   Procedure Laterality Date    CARPAL TUNNEL RELEASE      CHOLECYSTECTOMY      COLONOSCOPY      COLONOSCOPY N/A 12/9/2024    Procedure: COLONOSCOPY FOR SCREENING;  Surgeon: Nadia Sanz MD;  Location: [facility] ENDOSCOPY;  Service: Gastroenterology;  Laterality: N/A;  normal colon    ENDOSCOPY N/A 10/28/2021    Procedure: ESOPHAGOGASTRODUODENOSCOPY with biopsies;  Surgeon: Nadia Sanz MD;  Location: [facility] ENDOSCOPY;  Service: Gastroenterology;  Laterality: N/A;  esophagitis and gastritis    HYSTERECTOMY      THYROID SURGERY      UPPER GASTROINTESTINAL ENDOSCOPY       Family History   Problem Relation Age of Onset    Diabetes type II Other     Cancer Mother     Cancer Father     Cancer  Sister     Hypertension Brother     Diabetes Brother     Malig Hyperthermia Neg Hx      Social History     Socioeconomic History    Marital status:     Number of children: 2   Tobacco Use    Smoking status: Never    Smokeless tobacco: Never   Vaping Use    Vaping status: Never Used   Substance and Sexual Activity    Alcohol use: Never    Drug use: Never    Sexual activity: Not Currently     Partners: Male     Birth control/protection: Hysterectomy     Allergies   Allergen Reactions    Macrobid [Nitrofurantoin] GI Intolerance     Constipation, nausea, headache    Sulfa Antibiotics Hives     Other reaction(s): rash    Levofloxacin Rash       Current Outpatient Medications:     aspirin 81 MG EC tablet, Aspirin Low Dose 81 mg oral tablet,delayed release (DR/EC) take 1 tablet (81 mg) by oral route once daily   Active, Disp: , Rfl:     carvedilol (COREG) 6.25 MG tablet, Take 1 tablet by mouth Daily., Disp: , Rfl:     escitalopram (LEXAPRO) 10 MG tablet, TAKE 1 TABLET EVERY DAY, Disp: 90 tablet, Rfl: 1    famotidine (Pepcid) 40 MG tablet, Take 1 tablet by mouth every night at bedtime., Disp: 90 tablet, Rfl: 3    fluticasone (FLONASE) 50 MCG/ACT nasal spray, Administer 2 sprays into the nostril(s) as directed by provider Daily., Disp: , Rfl:     hydroCHLOROthiazide 25 MG tablet, TAKE 1 TABLET EVERY DAY, Disp: 90 tablet, Rfl: 3    levothyroxine (SYNTHROID, LEVOTHROID) 75 MCG tablet, TAKE 1 TABLET BY MOUTH DAILY, Disp: 90 tablet, Rfl: 0    lisinopril (PRINIVIL,ZESTRIL) 40 MG tablet, TAKE 1 TABLET EVERY DAY, Disp: 90 tablet, Rfl: 3    multivitamin (Multi-Vitamin Daily) tablet tablet, , Disp: , Rfl:     NovoLOG 100 UNIT/ML injection, Inject 67 Units under the skin into the appropriate area as directed Daily for 180 days., Disp: 60 mL, Rfl: 1    pantoprazole (PROTONIX) 40 MG EC tablet, Take 1 tablet by mouth Daily., Disp: 90 tablet, Rfl: 3    simvastatin (ZOCOR) 20 MG tablet, Take 1 tablet by mouth Every Night., Disp: ,  "Rfl:     vitamin B-12 (cyanocobalamin) 100 MCG tablet, Take 0.5 tablets by mouth Daily., Disp: , Rfl:     vitamin D3 (Vitamin D) 125 MCG (5000 UT) capsule capsule, , Disp: , Rfl:     Objective     Vitals:    01/27/25 1259   BP: 150/71   BP Location: Right arm   Patient Position: Sitting   Cuff Size: Adult   Pulse: 54   SpO2: 100%   Weight: 60.3 kg (132 lb 14.4 oz)   Height: 157.5 cm (62.01\")     Body mass index is 24.3 kg/m².    Physical Exam  Constitutional:       Appearance: Normal appearance. She is normal weight.   HENT:      Head: Normocephalic and atraumatic.      Right Ear: External ear normal.      Left Ear: External ear normal.      Nose: Nose normal.   Eyes:      Extraocular Movements: Extraocular movements intact.      Conjunctiva/sclera: Conjunctivae normal.   Pulmonary:      Effort: Pulmonary effort is normal.   Musculoskeletal:         General: Normal range of motion.      Cervical back: Normal range of motion.   Skin:     General: Skin is warm and dry.   Neurological:      General: No focal deficit present.      Mental Status: She is alert and oriented to person, place, and time. Mental status is at baseline.   Psychiatric:         Mood and Affect: Mood normal.         Behavior: Behavior normal.         Thought Content: Thought content normal.         Judgment: Judgment normal.             Result Review :   The following data was reviewed by: JACKIE Qureshi on 01/27/2025:    Most Recent A1C          1/27/2025    13:04   HGBA1C Most Recent   Hemoglobin A1C 7.2        A1C Last 3 Results          7/23/2024    09:24 10/28/2024    14:47 1/27/2025    13:04   HGBA1C Last 3 Results   Hemoglobin A1C 6.6  6.7  7.2      A1c collected in the office today is 7.2%, indicating Uncontrolled Type I diabetes.  This result is up from the prior result of 6.7% collected on 10/28/24     Glucose   Date Value Ref Range Status   01/27/2025 209 (H) 70 - 99 mg/dL Final     Comment:     Serial Number: " [id number]:  [id number]     Point of care glucose in the office today is  elevated at 209 mg/dL                Diagnoses and all orders for this visit:    1. Uncontrolled type 1 diabetes mellitus with hyperglycemia (Primary)    2. 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 Type 1 diabetes with stage 2 chronic kidney disease GFR 60-89    4. Insulin pump in place    5. Insulin pump titration    Other orders  -     POC Glucose        Assessment & Plan  1. Diabetes mellitus.  Her insulin pump report reveals an average glucose level of 162, with 66 percent of readings within the target range. Postprandial hyperglycemia and hypoglycemia have been observed. Her A1c level has increased from 6.7 in October 2024 to 7.2 today, which she attributes to holiday dietary indulgences and reduced physical activity due to adverse weather conditions. Her weight has remained relatively stable, with a minor increase of approximately half a pound since the last visit. She was advised to utilize the exercise mode on her insulin pump during physical activities to prevent hypoglycemic episodes. The correction factor on her pump will be adjusted from 1-60 to 1-70. She was encouraged to resume her walking routine once the weather improves. If this adjustment results in hyperglycemia, the correction factor can be modified to 65 or any value between 65 and 70. She was instructed to contact the office if any issues arise.    2. Retinopathy.  She reported some bleeding in the eyes, particularly on the outer edges, as noted by her eye doctor. She was advised to continue her follow-ups with her ophthalmologist and to adhere to any recommended treatments or interventions.          The patient will monitor her blood glucose levels per continuous glucose monitor.  If she develops problematic hyperglycemia or hypoglycemia or adverse drug  reactions, she will contact the office for further instructions.        Follow Up     Return in about 3 months (around 4/27/2025) for Pump Eval, CGM Follow-up.    Patient was given instructions and counseling regarding her condition or for health maintenance advice. Please see specific information pulled into the AVS if appropriate.     Ame Key, APRN  01/27/2025        Dictated Utilizing Dragon Dictation.  Please note that portions of this note were completed with a voice recognition program.  Part of this note may be an electronic transcription/translation of spoken language to printed text using the Dragon Dictation System.

## 2025-02-28 ENCOUNTER — TELEPHONE (OUTPATIENT)
Age: 75
End: 2025-02-28

## 2025-02-28 NOTE — TELEPHONE ENCOUNTER
Caller: Yohana Casillas    Relationship: Self    Best call back number: [phone]   What is the best time to reach you: ANYTIME    Who are you requesting to speak with (clinical staff, provider,  specific staff member): SCHEDULING OR REF COORDINATOR    Do you know the name of the person who called: PT IS UNAWARE IT WAS JUST A MESSAGE SENT     What was the call regarding: PT RECEIVED  MESSAGE SAYING SOMEONE WAS TRYING TO REACH HER TO SCHEDULE AN APPT AND SHE SAID SHE HAD A REF TO DR. KNAPP THAT WAS BEING SENT FROM [place]. I DO NOT SEE A REF IN THE REF TAB AND I DONT SEE A DOCUMENTED TE OF ANYONE REACHING OUT BUT SHE SAID IT WAS AN AUTOMATED MESSAGE. PLEASE CALL THE PT TO ADVISE FURTHER IF THIS IS SOMETHING THAT FOR SOME REASON ON OUR END IS NOT SHOWING UP. THANK    Is it okay if the provider responds through [place]: NO

## 2025-03-11 ENCOUNTER — OFFICE VISIT (OUTPATIENT)
Age: 75
End: 2025-03-11
Payer: MEDICARE

## 2025-03-11 VITALS
BODY MASS INDEX: 24.29 KG/M2 | OXYGEN SATURATION: 98 % | HEIGHT: 62 IN | HEART RATE: 54 BPM | WEIGHT: 132 LBS | RESPIRATION RATE: 18 BRPM | TEMPERATURE: 98.1 F | SYSTOLIC BLOOD PRESSURE: 139 MMHG | DIASTOLIC BLOOD PRESSURE: 66 MMHG

## 2025-03-11 DIAGNOSIS — I65.23 BILATERAL CAROTID ARTERY STENOSIS: Primary | ICD-10-CM

## 2025-03-12 ENCOUNTER — PATIENT ROUNDING (BHMG ONLY) (OUTPATIENT)
Age: 75
End: 2025-03-12
Payer: MEDICARE

## 2025-03-12 NOTE — PROGRESS NOTES
[facility] Vascular Surgery New Patient Office Note     Date of Encounter: 03/11/2025     MRN Number: [id number]  Name: Yohana Casillas  Phone Number: [phone]     Referred By: Malu Marrero APRN  PCP: Malu Marrero APRN    Chief Complaint:    Chief Complaint   Patient presents with    Carotid Artery Disease     Patient is here as a new patient with a chief diagnosis of carotid stenosis. Patient states she was sen by optometry that raised concerns she may have issues with her carotid arteries. That patient had a carotid duplex at [facility] and found the presence of plaque in the left carotid artery. The duplex saw no plaque in the right.        Subjective      History of Present Illness:    Yohana Casillas is a 74 y.o. female presents as a referral for bilateral carotid artery stenosis.  She had recently seen the optometrist who was concerned with bilateral bleeding behind her eyes and recommended a carotid duplex.  She denies any signs or symptoms to suggest CVA or TIA.  She takes aspirin and a statin medication.  She is not a smoker.  Her history includes diabetes and hypertension.  She is accompanied by her , Karri. No complaints at this time.      Review of Systems:  ROS  Review of Systems   Constitutional: Negative.   HENT: Negative.    Cardiovascular: Negative.    Respiratory: Negative.    Skin: Negative.    Musculoskeletal: Negative.    Gastrointestinal: Negative.    Neurological: Negative.    Psychiatric/Behavioral: Negative.     I have reviewed the following portions of the patient's history: problem list, current medications, allergies, past surgical history, past medical history, past social history, past family history, and ROS and confirm it's accurate.    Allergies:  Allergies   Allergen Reactions    Macrobid [Nitrofurantoin] GI Intolerance     Constipation, nausea, headache    Sulfa Antibiotics Hives     Other reaction(s): rash    Levofloxacin Rash       Medications:       Current Outpatient Medications:     aspirin 81 MG EC tablet, Aspirin Low Dose 81 mg oral tablet,delayed release (DR/EC) take 1 tablet (81 mg) by oral route once daily   Active, Disp: , Rfl:     carvedilol (COREG) 6.25 MG tablet, Take 1 tablet by mouth Daily., Disp: , Rfl:     escitalopram (LEXAPRO) 10 MG tablet, TAKE 1 TABLET EVERY DAY, Disp: 90 tablet, Rfl: 1    famotidine (Pepcid) 40 MG tablet, Take 1 tablet by mouth every night at bedtime., Disp: 90 tablet, Rfl: 3    fluticasone (FLONASE) 50 MCG/ACT nasal spray, Administer 2 sprays into the nostril(s) as directed by provider Daily., Disp: , Rfl:     hydroCHLOROthiazide 25 MG tablet, TAKE 1 TABLET EVERY DAY, Disp: 90 tablet, Rfl: 3    levothyroxine (SYNTHROID, LEVOTHROID) 75 MCG tablet, TAKE 1 TABLET BY MOUTH DAILY, Disp: 90 tablet, Rfl: 0    lisinopril (PRINIVIL,ZESTRIL) 40 MG tablet, TAKE 1 TABLET EVERY DAY, Disp: 90 tablet, Rfl: 3    multivitamin (Multi-Vitamin Daily) tablet tablet, , Disp: , Rfl:     NovoLOG 100 UNIT/ML injection, Inject 67 Units under the skin into the appropriate area as directed Daily for 180 days., Disp: 60 mL, Rfl: 1    pantoprazole (PROTONIX) 40 MG EC tablet, Take 1 tablet by mouth Daily., Disp: 90 tablet, Rfl: 3    simvastatin (ZOCOR) 20 MG tablet, Take 1 tablet by mouth Every Night., Disp: , Rfl:     vitamin B-12 (cyanocobalamin) 100 MCG tablet, Take 0.5 tablets by mouth Daily., Disp: , Rfl:     vitamin D3 (Vitamin D) 125 MCG (5000 UT) capsule capsule, , Disp: , Rfl:     History:   Past Medical History:   Diagnosis Date    Anxiety     Cancer     basal cell    Diabetes mellitus type I     GERD (gastroesophageal reflux disease)     History of thyroplasty 07/2021    Hyperlipidemia     Hypertension     Retinopathy     Thyroid disease        Past Surgical History:   Procedure Laterality Date    CARPAL TUNNEL RELEASE      CHOLECYSTECTOMY      COLONOSCOPY      COLONOSCOPY N/A 12/9/2024    Procedure: COLONOSCOPY FOR SCREENING;  Surgeon:  "Nadia Sanz MD;  Location: [facility] ENDOSCOPY;  Service: Gastroenterology;  Laterality: N/A;  normal colon    ENDOSCOPY N/A 10/28/2021    Procedure: ESOPHAGOGASTRODUODENOSCOPY with biopsies;  Surgeon: Nadia Sanz MD;  Location: [facility] ENDOSCOPY;  Service: Gastroenterology;  Laterality: N/A;  esophagitis and gastritis    HYSTERECTOMY      THYROID SURGERY      UPPER GASTROINTESTINAL ENDOSCOPY         Social History     Socioeconomic History    Marital status:     Number of children: 2   Tobacco Use    Smoking status: Never    Smokeless tobacco: Never   Vaping Use    Vaping status: Never Used   Substance and Sexual Activity    Alcohol use: Never    Drug use: Never    Sexual activity: Not Currently     Partners: Male     Birth control/protection: Hysterectomy        Family History   Problem Relation Age of Onset    Diabetes type II Other     Cancer Mother     Cancer Father     Cancer Sister     Hypertension Brother     Diabetes Brother     Malig Hyperthermia Neg Hx        Objective     Physical Exam:  Vitals:    03/11/25 1432   BP: 139/66   BP Location: Right arm   Patient Position: Sitting   Cuff Size: Large Adult   Pulse: 54   Resp: 18   Temp: 98.1 °F (36.7 °C)   TempSrc: Oral   SpO2: 98%   Weight: 59.9 kg (132 lb)   Height: 157.5 cm (62\")   PainSc: 0-No pain      Body mass index is 24.14 kg/m².  BMI is within normal parameters. No other follow-up for BMI required.       Physical Exam   Physical Exam  Constitutional:       Appearance: Normal  HENT:      Head: Normocephalic and atraumatic.   Eyes:      Pupils: Pupils are equal, round, and reactive to light.   Neck:      Comments: No bruit  Cardiovascular:      Rate and Rhythm: Normal rate and regular rhythm.  Bilateral radial pulses.  Pulmonary:      Effort: Pulmonary effort is normal.   Musculoskeletal:      Cervical back: Normal range of motion and neck supple.   Skin:     General: Skin is warm and dry.   Neurological:      General: No " focal deficit present.      Mental Status: Alert and oriented to person, place, and time.     Imaging/Labs:    No radiology results for the last 30 days.       Assessment / Plan      Assessment / Plan:  Diagnoses and all orders for this visit:    1. Bilateral carotid artery stenosis (Primary)  -     Duplex Carotid Ultrasound CAR; Future    Mr. Mata recently had a carotid duplex that revealed moderate left carotid artery stenosis with a peak velocity of 134 cm/s with a diastolic of 31 cm/s.  We have discussed symptoms, testing and interventions including lifestyle modifications to help reduce risk factors.  She takes a aspirin and statin medication daily.  I recommended we follow-up in 6 months with a repeat carotid duplex and if stable at that time we can discuss annual surveillance.    I have answered all of her questions and she is in agreement with the plan at this time.  Thank you for allowing me to participate in your patient's care.    Patient Education: Lifestyle modifications to help reduce risk factors including blood pressure, glucose and cholesterol control along with activity and a heart healthy diet.      Follow Up:   No follow-ups on file.   Or sooner for any further concerns or worsening sign and symptoms. If unable to reach us in the office please dial 911 or go to the nearest emergency department.      JACKIE Chin  [facility] Vascular Surgery

## 2025-04-27 NOTE — PROGRESS NOTES
Chief Complaint  Diabetes (Med management, A1C, CGM/Pump Eval)    Referred By: JACKIE Perla    Subjective          Patient or patient representative verbalized consent for the use of Ambient Listening during the visit with  JACKIE Qureshi for chart documentation. 4/28/2025  13:41 EDT    Yohana Casillas presents to [facility] for insulin pump management    History of Present Illness    Visit type:  follow-up  Diabetes type:  Type 1  Current diabetes status/concerns/issues:     History of Present Illness  The patient presents for evaluation of diabetes.    Hypoglycemic episodes are reported, characterized by weakness and a sensation of heat. An episode occurred this morning due to the inability to consume the entire breakfast. Blood glucose levels are monitored using a tandem pump, with an average glucose level of 159 and 71% of readings within the target range. Occasional highs and lows are noted, particularly in the morning around 9 or 10 AM.  Currently managed using the tandem insulin pump with NovoLog insulin.    Physical activity is limited to walking and yard work. Bolus insulin is typically administered immediately prior to or at the onset of meals. No new health concerns are reported.      Current Diabetes symptoms:    Polyuria: No   Polydipsia: No   Polyphagia: No   Blurred vision: No   Excessive fatigue: No  Known Diabetes complications:  Neuropathy: None; Location: N/A  Renal: Stage II mild (GFR = 60-89 mL/min) and Microalbuminuria - NEGATIVE  Eyes: Moderate Nonproliferative Retinopathy and Without Macular Edema; Location: Bilateral; Date of Last Exam: 11/25/24  Amputation/Wounds: None  GI: None  Cardiovascular: Hypertension and Hyperlipidemia  ED: N/A  Other: None  Hypoglycemia:  Level 1 hypoglycemia (54 mg/dL - 70 mg/dL); Frequency - 1.6% per CGM and Level 2 (less than 54 mg/dL); Frequency - 0.3% per CGM  Hypoglycemia Symptoms:  shaking/tremors and  sweating  Current diabetes treatment:  Tandem insulin pump using NovoLog insulin. She is using approximately 30 units of insulin a day    Blood glucose device:  Dexcom CGM  Blood glucose monitoring frequency:  Continuous per CGM  Blood glucose range/average:  The 14-day sensor report shows an average glucose of 159 mg/dL, with 71% in target range ( mgdL), 21% in the high range (181-250 mg/dL), 6% in the very high range (>250 mg/dL), 1.6% in the low range (54-70 mg/dL) and 0.3% in the very low range (<54 mg/dL).   Glucose Source: Device Reviewed  Diet:  Carbohydrate Counting, Limits high carb/sweet foods, Avoids sugary drinks  Activity/Exercise:   walking some and outdoor work    Past Medical History:   Diagnosis Date    Anxiety     Cancer     basal cell    Diabetes mellitus type I     GERD (gastroesophageal reflux disease)     History of thyroplasty 07/2021    Hyperlipidemia     Hypertension     Retinopathy     Thyroid disease      Past Surgical History:   Procedure Laterality Date    CARPAL TUNNEL RELEASE      CHOLECYSTECTOMY      COLONOSCOPY      COLONOSCOPY N/A 12/9/2024    Procedure: COLONOSCOPY FOR SCREENING;  Surgeon: Nadia Sanz MD;  Location: [facility] END[facility]COPY;  Service: Gastroenterology;  Laterality: N/A;  normal colon    ENDOSCOPY N/A 10/28/2021    Procedure: ESOPHAGOGASTRODUODENOSCOPY with biopsies;  Surgeon: Nadia Sanz MD;  Location: [facility] ENDOSCOPY;  Service: Gastroenterology;  Laterality: N/A;  esophagitis and gastritis    HYSTERECTOMY      THYROID SURGERY      UPPER GASTROINTESTINAL ENDOSCOPY       Family History   Problem Relation Age of Onset    Diabetes type II Other     Cancer Mother     Cancer Father     Cancer Sister     Hypertension Brother     Diabetes Brother     Malig Hyperthermia Neg Hx      Social History     Socioeconomic History    Marital status:     Number of children: 2   Tobacco Use    Smoking status: Never    Smokeless tobacco: Never   Vaping  Use    Vaping status: Never Used   Substance and Sexual Activity    Alcohol use: Never    Drug use: Never    Sexual activity: Not Currently     Partners: Male     Birth control/protection: Hysterectomy     Allergies   Allergen Reactions    Macrobid [Nitrofurantoin] GI Intolerance     Constipation, nausea, headache    Sulfa Antibiotics Hives     Other reaction(s): rash    Levofloxacin Rash       Current Outpatient Medications:     aspirin 81 MG EC tablet, Aspirin Low Dose 81 mg oral tablet,delayed release (DR/EC) take 1 tablet (81 mg) by oral route once daily   Active, Disp: , Rfl:     carvedilol (COREG) 6.25 MG tablet, Take 1 tablet by mouth Daily., Disp: , Rfl:     escitalopram (LEXAPRO) 10 MG tablet, TAKE 1 TABLET EVERY DAY, Disp: 90 tablet, Rfl: 1    famotidine (Pepcid) 40 MG tablet, Take 1 tablet by mouth every night at bedtime., Disp: 90 tablet, Rfl: 3    fluticasone (FLONASE) 50 MCG/ACT nasal spray, Administer 2 sprays into the nostril(s) as directed by provider Daily., Disp: , Rfl:     hydroCHLOROthiazide 25 MG tablet, TAKE 1 TABLET EVERY DAY, Disp: 90 tablet, Rfl: 3    levothyroxine (SYNTHROID, LEVOTHROID) 75 MCG tablet, TAKE 1 TABLET BY MOUTH DAILY, Disp: 90 tablet, Rfl: 0    lisinopril (PRINIVIL,ZESTRIL) 40 MG tablet, TAKE 1 TABLET EVERY DAY, Disp: 90 tablet, Rfl: 3    multivitamin (Multi-Vitamin Daily) tablet tablet, , Disp: , Rfl:     NovoLOG 100 UNIT/ML injection, Inject 67 Units under the skin into the appropriate area as directed Daily for 180 days., Disp: 60 mL, Rfl: 1    pantoprazole (PROTONIX) 40 MG EC tablet, Take 1 tablet by mouth Daily., Disp: 90 tablet, Rfl: 3    simvastatin (ZOCOR) 20 MG tablet, Take 1 tablet by mouth Every Night., Disp: , Rfl:     vitamin B-12 (cyanocobalamin) 100 MCG tablet, Take 0.5 tablets by mouth Daily., Disp: , Rfl:     vitamin D3 (Vitamin D) 125 MCG (5000 UT) capsule capsule, , Disp: , Rfl:     Objective     Vitals:    04/28/25 1324   BP: 129/67   BP Location: Right  "arm   Patient Position: Sitting   Cuff Size: Adult   Pulse: 57   SpO2: 97%   Weight: 60.3 kg (133 lb)   Height: 157.5 cm (62\")     Body mass index is 24.33 kg/m².    Physical Exam  Constitutional:       Appearance: Normal appearance. She is normal weight.   HENT:      Head: Normocephalic and atraumatic.      Right Ear: External ear normal.      Left Ear: External ear normal.      Nose: Nose normal.   Eyes:      Extraocular Movements: Extraocular movements intact.      Conjunctiva/sclera: Conjunctivae normal.   Pulmonary:      Effort: Pulmonary effort is normal.   Musculoskeletal:         General: Normal range of motion.      Cervical back: Normal range of motion.   Skin:     General: Skin is warm and dry.   Neurological:      General: No focal deficit present.      Mental Status: She is alert and oriented to person, place, and time. Mental status is at baseline.   Psychiatric:         Mood and Affect: Mood normal.         Behavior: Behavior normal.         Thought Content: Thought content normal.         Judgment: Judgment normal.             Result Review :   The following data was reviewed by: JACKIE Qureshi on 04/28/2025:    Most Recent A1C          4/28/2025    13:35   HGBA1C Most Recent   Hemoglobin A1C 7.1        A1C Last 3 Results          10/28/2024    14:47 1/27/2025    13:04 4/28/2025    13:35   HGBA1C Last 3 Results   Hemoglobin A1C 6.7  7.2  7.1      A1c collected in the office today is 7.1%, indicating Uncontrolled Type I diabetes.  This result is down from the prior result of 7.2% collected on 1/27/25     Glucose   Date Value Ref Range Status   04/28/2025 152 (H) 70 - 99 mg/dL Final     Comment:     Serial Number: [id number]:  [id number]     Point of care glucose in the office today is within normal limits for nonfasting glucose                Diagnoses and all orders for this visit:    1. Uncontrolled type 1 diabetes mellitus with hyperglycemia (Primary)  -     POC Glycosylated " Hemoglobin (Hb A1C)  -     POC Glucose  -     NovoLOG 100 UNIT/ML injection; Inject 67 Units under the skin into the appropriate area as directed Daily for 180 days.  Dispense: 60 mL; Refill: 1    2. 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 Type 1 diabetes with stage 2 chronic kidney disease GFR 60-89    4. Insulin pump in place    5. Insulin pump titration    6. Uses self-applied continuous glucose monitoring device        Assessment & Plan  1. Diabetes Mellitus:  - Glucose levels are generally well-controlled, with an average of 159 and 71% of readings within the target range.  - A1c level has decreased slightly from 7.2 in 01/2025 to 7.1 currently.  - Carbohydrate ratio at 9 AM will be maintained at 10, a new setting at 11 AM with a reduced ratio of 8, and the 3 PM ratio will be adjusted from 15 to 13.  - Basal rate will be decreased from 0.3 to 0.2 between 7 AM and 9 AM. A prescription refill for insulin has been sent to [facility] in [place].          The patient will monitor her blood glucose levels per continuous glucose monitor.  If she develops problematic hyperglycemia or hypoglycemia or adverse drug reactions, she will contact the office for further instructions.        Follow Up     Return in about 3 months (around 7/28/2025) for Pump Eval, CGM Follow-up.    Patient was given instructions and counseling regarding her condition or for health maintenance advice. Please see specific information pulled into the AVS if appropriate.     Ame Key, JACKIE  04/28/2025        Dictated Utilizing Dragon Dictation.  Please note that portions of this note were completed with a voice recognition program.  Part of this note may be an electronic transcription/translation of spoken language to printed text using the Dragon Dictation System.

## 2025-04-28 ENCOUNTER — OFFICE VISIT (OUTPATIENT)
Dept: DIABETES SERVICES | Facility: HOSPITAL | Age: 75
End: 2025-04-28
Payer: MEDICARE

## 2025-04-28 VITALS
DIASTOLIC BLOOD PRESSURE: 67 MMHG | HEART RATE: 57 BPM | BODY MASS INDEX: 24.48 KG/M2 | OXYGEN SATURATION: 97 % | WEIGHT: 133 LBS | SYSTOLIC BLOOD PRESSURE: 129 MMHG | HEIGHT: 62 IN

## 2025-04-28 DIAGNOSIS — Z96.41 INSULIN PUMP IN PLACE: ICD-10-CM

## 2025-04-28 DIAGNOSIS — Z97.8 USES SELF-APPLIED CONTINUOUS GLUCOSE MONITORING DEVICE: ICD-10-CM

## 2025-04-28 DIAGNOSIS — Z46.81 INSULIN PUMP TITRATION: ICD-10-CM

## 2025-04-28 DIAGNOSIS — E10.22 TYPE 1 DIABETES WITH STAGE 2 CHRONIC KIDNEY DISEASE GFR 60-89: ICD-10-CM

## 2025-04-28 DIAGNOSIS — E10.65 UNCONTROLLED TYPE 1 DIABETES MELLITUS WITH HYPERGLYCEMIA: Primary | ICD-10-CM

## 2025-04-28 DIAGNOSIS — E10.3393 TYPE 1 DIABETES MELLITUS WITH MODERATE NONPROLIFERATIVE RETINOPATHY OF BOTH EYES WITHOUT MACULAR EDEMA: ICD-10-CM

## 2025-04-28 DIAGNOSIS — N18.2 TYPE 1 DIABETES WITH STAGE 2 CHRONIC KIDNEY DISEASE GFR 60-89: ICD-10-CM

## 2025-04-28 LAB
EXPIRATION DATE: ABNORMAL
GLUCOSE BLDC GLUCOMTR-MCNC: 152 MG/DL (ref 70–99)
HBA1C MFR BLD: 7.1 % (ref 4.5–5.7)
Lab: ABNORMAL

## 2025-04-28 PROCEDURE — G2211 COMPLEX E/M VISIT ADD ON: HCPCS | Performed by: NURSE PRACTITIONER

## 2025-04-28 PROCEDURE — 1160F RVW MEDS BY RX/DR IN RCRD: CPT | Performed by: NURSE PRACTITIONER

## 2025-04-28 PROCEDURE — 3074F SYST BP LT 130 MM HG: CPT | Performed by: NURSE PRACTITIONER

## 2025-04-28 PROCEDURE — 99214 OFFICE O/P EST MOD 30 MIN: CPT | Performed by: NURSE PRACTITIONER

## 2025-04-28 PROCEDURE — 3051F HG A1C>EQUAL 7.0%<8.0%: CPT | Performed by: NURSE PRACTITIONER

## 2025-04-28 PROCEDURE — 83036 HEMOGLOBIN GLYCOSYLATED A1C: CPT | Performed by: NURSE PRACTITIONER

## 2025-04-28 PROCEDURE — G0463 HOSPITAL OUTPT CLINIC VISIT: HCPCS | Performed by: NURSE PRACTITIONER

## 2025-04-28 PROCEDURE — 3078F DIAST BP <80 MM HG: CPT | Performed by: NURSE PRACTITIONER

## 2025-04-28 PROCEDURE — 82948 REAGENT STRIP/BLOOD GLUCOSE: CPT | Performed by: NURSE PRACTITIONER

## 2025-04-28 PROCEDURE — 95251 CONT GLUC MNTR ANALYSIS I&R: CPT | Performed by: NURSE PRACTITIONER

## 2025-04-28 PROCEDURE — 1159F MED LIST DOCD IN RCRD: CPT | Performed by: NURSE PRACTITIONER

## 2025-04-28 RX ORDER — INSULIN ASPART 100 [IU]/ML
67 INJECTION, SOLUTION INTRAVENOUS; SUBCUTANEOUS DAILY
Qty: 60 ML | Refills: 1 | Status: SHIPPED | OUTPATIENT
Start: 2025-04-28 | End: 2025-10-25

## 2025-04-29 ENCOUNTER — OFFICE VISIT (OUTPATIENT)
Dept: PODIATRY | Facility: CLINIC | Age: 75
End: 2025-04-29
Payer: MEDICARE

## 2025-04-29 VITALS
BODY MASS INDEX: 24.14 KG/M2 | WEIGHT: 132 LBS | TEMPERATURE: 98 F | SYSTOLIC BLOOD PRESSURE: 129 MMHG | OXYGEN SATURATION: 96 % | HEART RATE: 53 BPM | DIASTOLIC BLOOD PRESSURE: 63 MMHG

## 2025-04-29 DIAGNOSIS — E11.8 DM FEET: ICD-10-CM

## 2025-04-29 DIAGNOSIS — E11.9 INSULIN-REQUIRING OR DEPENDENT TYPE II DIABETES MELLITUS: Primary | ICD-10-CM

## 2025-04-29 DIAGNOSIS — Z79.4 INSULIN-REQUIRING OR DEPENDENT TYPE II DIABETES MELLITUS: Primary | ICD-10-CM

## 2025-04-29 NOTE — PROGRESS NOTES
[facility] - PODIATRY    Today's Date: 04/29/25    Patient Name: Yohana Casillas  MRN: [id number]  CSN: [id number]  PCP: Malu Marrero APRN, Last PCP Visit: 2/18/2025  Referring Provider: No ref. provider found    SUBJECTIVE     Chief Complaint   Patient presents with    Left Foot - Follow-up, Diabetes    Right Foot - Follow-up, Diabetes     HPI: Yohana Casillas, a 74 y.o.female, presents to clinic for a diabetic foot evaluation.    New, Established, New Problem: Established    Onset: Insidious    Nature: IDDM    Stable, worsening, improving: Stable    Patient controlling diabetes via: Insulin pumps with CGM    Patient states their most recent blood glucose reading was 141    Patient denies any fevers, chills, nausea, vomiting, shortness of breath, nor any other constitutional signs nor symptoms.    Medical changes: none    I have reviewed/confirmed previously documented HPI with no changes.       Past Medical History:   Diagnosis Date    Anxiety     Cancer     basal cell    Diabetes mellitus type I     GERD (gastroesophageal reflux disease)     History of thyroplasty 07/2021    Hyperlipidemia     Hypertension     Retinopathy     Thyroid disease      Past Surgical History:   Procedure Laterality Date    CARPAL TUNNEL RELEASE      CHOLECYSTECTOMY      COLONOSCOPY      COLONOSCOPY N/A 12/9/2024    Procedure: COLONOSCOPY FOR SCREENING;  Surgeon: Nadia Sanz MD;  Location: [facility] ENDOSCOPY;  Service: Gastroenterology;  Laterality: N/A;  normal colon    ENDOSCOPY N/A 10/28/2021    Procedure: ESOPHAGOGASTRODUODENOSCOPY with biopsies;  Surgeon: Nadia Sanz MD;  Location: [facility] ENDOSCOPY;  Service: Gastroenterology;  Laterality: N/A;  esophagitis and gastritis    HYSTERECTOMY      THYROID SURGERY      UPPER GASTROINTESTINAL ENDOSCOPY       Family History   Problem Relation Age of Onset    Diabetes type II Other     Cancer Mother     Cancer Father     Cancer Sister     Hypertension  Brother     Diabetes Brother     Malig Hyperthermia Neg Hx      Social History     Socioeconomic History    Marital status:     Number of children: 2   Tobacco Use    Smoking status: Never    Smokeless tobacco: Never   Vaping Use    Vaping status: Never Used   Substance and Sexual Activity    Alcohol use: Never    Drug use: Never    Sexual activity: Not Currently     Partners: Male     Birth control/protection: Hysterectomy     Allergies   Allergen Reactions    Macrobid [Nitrofurantoin] GI Intolerance     Constipation, nausea, headache    Sulfa Antibiotics Hives     Other reaction(s): rash    Levofloxacin Rash     Current Outpatient Medications   Medication Sig Dispense Refill    aspirin 81 MG EC tablet Aspirin Low Dose 81 mg oral tablet,delayed release (DR/EC) take 1 tablet (81 mg) by oral route once daily   Active      carvedilol (COREG) 6.25 MG tablet Take 1 tablet by mouth Daily.      escitalopram (LEXAPRO) 10 MG tablet TAKE 1 TABLET EVERY DAY 90 tablet 1    famotidine (Pepcid) 40 MG tablet Take 1 tablet by mouth every night at bedtime. 90 tablet 3    fluticasone (FLONASE) 50 MCG/ACT nasal spray Administer 2 sprays into the nostril(s) as directed by provider Daily.      hydroCHLOROthiazide 25 MG tablet TAKE 1 TABLET EVERY DAY 90 tablet 3    levothyroxine (SYNTHROID, LEVOTHROID) 75 MCG tablet TAKE 1 TABLET BY MOUTH DAILY 90 tablet 0    lisinopril (PRINIVIL,ZESTRIL) 40 MG tablet TAKE 1 TABLET EVERY DAY 90 tablet 3    multivitamin (Multi-Vitamin Daily) tablet tablet       NovoLOG 100 UNIT/ML injection Inject 67 Units under the skin into the appropriate area as directed Daily for 180 days. 60 mL 1    pantoprazole (PROTONIX) 40 MG EC tablet Take 1 tablet by mouth Daily. 90 tablet 3    simvastatin (ZOCOR) 20 MG tablet Take 1 tablet by mouth Every Night.      vitamin B-12 (cyanocobalamin) 100 MCG tablet Take 0.5 tablets by mouth Daily.      vitamin D3 (Vitamin D) 125 MCG (5000 UT) capsule capsule        No  current facility-administered medications for this visit.     Review of Systems   Constitutional: Negative.    All other systems reviewed and are negative.      OBJECTIVE     Vitals:    04/29/25 0810   BP: 129/63   Pulse: 53   Temp: 98 °F (36.7 °C)   SpO2: 96%       Body mass index is 24.14 kg/m².    Lab Results   Component Value Date    HGBA1C 7.1 (A) 04/28/2025       Lab Results   Component Value Date    GLUCOSE 115 (H) 05/30/2023    CALCIUM 9.6 05/30/2023     05/30/2023    K 4.1 05/30/2023    CO2 29.5 (H) 05/30/2023     05/30/2023    BUN 17 05/30/2023    CREATININE 0.78 05/30/2023    EGFRIFNONA 79 01/10/2022    BCR 21.8 05/30/2023    ANIONGAP 6.5 05/30/2023       Patient seen in no apparent distress.      PHYSICAL EXAM:     Foot/Ankle Exam    GENERAL  Diabetic foot exam performed    Appearance:  appears stated age  Orientation:  AAOx3  Affect:  appropriate  Gait:  unimpaired  Assistance:  independent  Right shoe gear: casual shoe  Left shoe gear: casual shoe    VASCULAR     Right Foot Vascularity   Dorsalis pedis:  1+  Posterior tibial:  1+  Skin temperature:  warm  Edema grading:  None  CFT:  < 3 seconds  Pedal hair growth:  Absent  Varicosities:  mild varicosities     Left Foot Vascularity   Dorsalis pedis:  1+  Posterior tibial:  1+  Skin temperature:  warm  Edema grading:  None  CFT:  < 3 seconds  Pedal hair growth:  Absent  Varicosities:  mild varicosities     NEUROLOGIC     Right Foot Neurologic   Normal sensation    Light touch sensation: normal  Vibratory sensation: normal  Hot/Cold sensation: normal  Protective Sensation using [place]-Donya Monofilament:   Sites intact: 10  Sites tested: 10     Left Foot Neurologic   Normal sensation    Light touch sensation: normal  Vibratory sensation: normal  Hot/Cold sensation:  normal  Protective Sensation using [place]-Donya Monofilament:   Sites intact: 10  Sites tested: 10    MUSCULOSKELETAL     Right Foot Musculoskeletal   Arch:  Pes  cavus  Hallux valgus: Yes       Left Foot Musculoskeletal   Arch:  Pes cavus  Hallux valgus: Yes      MUSCLE STRENGTH     Right Foot Muscle Strength   Foot dorsiflexion:  4  Foot plantar flexion:  4  Foot inversion:  4  Foot eversion:  4     Left Foot Muscle Strength   Foot dorsiflexion:  4  Foot plantar flexion:  4  Foot inversion:  4  Foot eversion:  4    RANGE OF MOTION     Right Foot Range of Motion   Foot and ankle ROM within normal limits       Left Foot Range of Motion   Foot and ankle ROM within normal limits      DERMATOLOGIC      Right Foot Dermatologic   Skin  Right foot skin is intact.      Left Foot Dermatologic   Skin  Left foot skin is intact.     Diabetic Foot Exam Performed and Monofilament Test Performed    I have reexamined the patient the results are consistent with the previously documented exam.  ASSESSMENT/PLAN     Diagnoses and all orders for this visit:    1. Insulin-requiring or dependent type II diabetes mellitus (Primary)    2. DM feet    Comprehensive lower extremity examination and evaluation was performed.    Discussed findings and treatment plan including risks, benefits, and treatment options with patient in detail. Patient agreed with treatment plan.    Medications and allergies reviewed.  Reviewed available blood glucose and HgB A1C lab values along with other pertinent labs.  These were discussed with the patient as to their importance of diabetic maintenance.    Diabetic foot exam performed and documented this date, compliant with CQM required standards. Detail of findings as noted in physical exam.  Lower extremity Neurologic exam for diabetic patient performed and documented this date, compliant with PQRS required standards. Detail of findings as noted in physical exam.  Advised patient importance of good routine lower extremity hygiene. Advised patient importance of evaluating for intact skin and pain free nail borders.  Advised patient to use mirror to evaluate plantar/ soles  of feet for better visualization. Advised patient monitor and phone office to be seen if any cracking to skin, open lesions, painful nail borders or if nails become elongated prior to next visit. Advised patient importance of daily cleansing of lower extremities, followed by good skin cream to maintain normal hydration of skin. Also advised patient importance of close daily monitoring of blood sugar. Advised to regulate diet and medications to maintain control of blood sugar in optimal range. Contact primary care provider if difficulties maintaining blood sugar levels.  Advised Patient of presence of Diabetes Mellitus condition.  Advised Patient risk of progression and worsening or improvement, then return of condition.  Will monitor condition for any change in future. Treat with most appropriate treatment pending status of condition.  Counseled and advised patient extensively on nature and ramifications of diabetes. Standard instructions given to patient for good diabetic foot care and maintenance. Advised importance of careful monitoring to avoid break down and complications secondary to diabetes. Advised patient importance of strict maintenance of blood sugar control. Advised patient of possible ominous results from neglect of condition, i.e.: amputation/ loss of digits, feet and legs, or even death.  Patient states understands counseling, will monitor closely, continue good hygiene and routine diabetic foot care. Patient will contact office is questions or problems.      An After Visit Summary was printed and given to the patient at discharge, including (if requested) any available informative/educational handouts regarding diagnosis, treatment, or medications. All questions were answered to patient/family satisfaction. Should symptoms fail to improve or worsen they agree to call or return to clinic or to go to the Emergency Department. Discussed the importance of following up with any needed screening  tests/labs/specialist appointments and any requested follow-up recommended by me today. Importance of maintaining follow-up discussed and patient accepts that missed appointments can delay diagnosis and potentially lead to worsening of conditions.    Return in about 1 year (around 4/29/2026) for DFE., or sooner if acute issues arise.    I have reviewed the assessment and plan and verified the accuracy of it. No changes to assessment and plan since the information was documented. Tanner Green DPM 04/29/25     I have dictated this note utilizing Dragon Dictation.  Please note that portions of this note were completed with a voice recognition program.  Part of this note may be an electronic transcription/translation of spoken language to printed text using the Dragon Dictation System.          This document has been electronically signed by Tanner Green DPM on April 29, 2025 09:01 EDT

## 2025-06-11 ENCOUNTER — PATIENT ROUNDING (BHMG ONLY) (OUTPATIENT)
Dept: URGENT CARE | Facility: CLINIC | Age: 75
End: 2025-06-11
Payer: MEDICARE

## 2025-06-11 NOTE — ED NOTES
Thank you for letting us care for you in your recent visit to our urgent care center. We would love to hear about your experience with us. Was this the first time you have visited our location?    We’re always looking for ways to make our patients’ experiences even better. Do you have any recommendations on ways we may improve?    I appreciate you taking the time to respond. Please be on the lookout for a survey about your recent visit from [employer] via text or email. We would greatly appreciate if you could fill that out and turn it back in. We want your voice to be heard and we value your feedback.  Thank you for choosing [facility] for your healthcare needs.

## 2025-07-08 ENCOUNTER — OFFICE VISIT (OUTPATIENT)
Dept: GASTROENTEROLOGY | Facility: CLINIC | Age: 75
End: 2025-07-08
Payer: MEDICARE

## 2025-07-08 VITALS
BODY MASS INDEX: 24.84 KG/M2 | SYSTOLIC BLOOD PRESSURE: 149 MMHG | DIASTOLIC BLOOD PRESSURE: 80 MMHG | HEIGHT: 62 IN | WEIGHT: 135 LBS | HEART RATE: 50 BPM

## 2025-07-08 DIAGNOSIS — K20.90 ESOPHAGITIS: ICD-10-CM

## 2025-07-08 DIAGNOSIS — K21.9 GASTROESOPHAGEAL REFLUX DISEASE, UNSPECIFIED WHETHER ESOPHAGITIS PRESENT: Primary | ICD-10-CM

## 2025-07-08 DIAGNOSIS — R15.2 FECAL URGENCY: ICD-10-CM

## 2025-07-08 RX ORDER — VONOPRAZAN FUMARATE 26.72 MG/1
20 TABLET ORAL DAILY
Qty: 90 TABLET | Refills: 1 | Status: SHIPPED | OUTPATIENT
Start: 2025-07-08

## 2025-07-08 NOTE — PROGRESS NOTES
Chief Complaint     Heartburn    Patient or patient representative verbalized consent for the use of Ambient Listening during the visit with  JACKIE Thomas for chart documentation. 7/8/2025  08:19 EDT      History of Present Illness     History of Present Illness  The patient presents for a follow-up of GERD.    GERD  - She experiences daily reflux symptoms, primarily at night.  - Breakfast includes oatmeal, scrambled eggs, toast, and coffee.  - Famotidine at bedtime and Protonix in the morning have not significantly improved her condition.  - Her primary care physician suggested a new medication during their last visit.  - She previously tried dicyclomine in the morning and colestipol for 3 to 4 days without relief.         History      Past Medical History:   Diagnosis Date    Anxiety     Cancer     basal cell    Diabetes mellitus type I     GERD (gastroesophageal reflux disease)     History of thyroplasty 07/2021    Hyperlipidemia     Hypertension     Retinopathy     Thyroid disease      Past Surgical History:   Procedure Laterality Date    CARPAL TUNNEL RELEASE      CHOLECYSTECTOMY      COLONOSCOPY      COLONOSCOPY N/A 12/9/2024    Procedure: COLONOSCOPY FOR SCREENING;  Surgeon: Nadia Sanz MD;  Location: [facility] ENDOSCOPY;  Service: Gastroenterology;  Laterality: N/A;  normal colon    ENDOSCOPY N/A 10/28/2021    Procedure: ESOPHAGOGASTRODUODENOSCOPY with biopsies;  Surgeon: Nadia Sanz MD;  Location: [facility] ENDOSCOPY;  Service: Gastroenterology;  Laterality: N/A;  esophagitis and gastritis    HYSTERECTOMY      THYROID SURGERY      UPPER GASTROINTESTINAL ENDOSCOPY       Family History   Problem Relation Age of Onset    Diabetes type II Other     Cancer Mother     Cancer Father     Cancer Sister     Hypertension Brother     Diabetes Brother     Malig Hyperthermia Neg Hx         Current Medications       Current Outpatient Medications:     aspirin 81 MG EC tablet, Aspirin Low  Dose 81 mg oral tablet,delayed release (DR/EC) take 1 tablet (81 mg) by oral route once daily   Active, Disp: , Rfl:     carvedilol (COREG) 6.25 MG tablet, Take 1 tablet by mouth Daily., Disp: , Rfl:     escitalopram (LEXAPRO) 10 MG tablet, TAKE 1 TABLET EVERY DAY, Disp: 90 tablet, Rfl: 1    famotidine (Pepcid) 40 MG tablet, Take 1 tablet by mouth every night at bedtime., Disp: 90 tablet, Rfl: 3    fluticasone (FLONASE) 50 MCG/ACT nasal spray, Administer 2 sprays into the nostril(s) as directed by provider Daily., Disp: , Rfl:     hydroCHLOROthiazide 25 MG tablet, TAKE 1 TABLET EVERY DAY, Disp: 90 tablet, Rfl: 3    levothyroxine (SYNTHROID, LEVOTHROID) 75 MCG tablet, TAKE 1 TABLET BY MOUTH DAILY, Disp: 90 tablet, Rfl: 0    lisinopril (PRINIVIL,ZESTRIL) 40 MG tablet, TAKE 1 TABLET EVERY DAY, Disp: 90 tablet, Rfl: 3    multivitamin (Multi-Vitamin Daily) tablet tablet, , Disp: , Rfl:     NovoLOG 100 UNIT/ML injection, Inject 67 Units under the skin into the appropriate area as directed Daily for 180 days., Disp: 60 mL, Rfl: 1    simvastatin (ZOCOR) 20 MG tablet, Take 1 tablet by mouth Every Night., Disp: , Rfl:     vitamin B-12 (cyanocobalamin) 100 MCG tablet, Take 0.5 tablets by mouth Daily., Disp: , Rfl:     vitamin D3 (Vitamin D) 125 MCG (5000 UT) capsule capsule, , Disp: , Rfl:     promethazine (PHENERGAN) 25 MG tablet, Take 1 tablet by mouth Every 6 (Six) Hours As Needed for Nausea or Vomiting. (Patient not taking: Reported on 7/8/2025), Disp: 30 tablet, Rfl: 0    Vonoprazan Fumarate (Voquezna) 20 MG tablet, Take 1 tablet by mouth Daily., Disp: 90 tablet, Rfl: 1     Allergies     Allergies   Allergen Reactions    Macrobid [Nitrofurantoin] GI Intolerance     Constipation, nausea, headache    Sulfa Antibiotics Hives     Other reaction(s): rash    Levofloxacin Rash       Social History       Social History     Social History Narrative    , 2 adult child, lives at home with           Objective       BP  "149/80 (BP Location: Left arm, Patient Position: Sitting, Cuff Size: Adult)   Pulse 50   Ht 157.5 cm (62.01\")   Wt 61.2 kg (135 lb)   BMI 24.69 kg/m²       Physical Exam  Constitutional:       General: She is not in acute distress.     Appearance: Normal appearance. She is well-developed and normal weight.   HENT:      Head: Normocephalic and atraumatic.   Eyes:      Conjunctiva/sclera: Conjunctivae normal.      Pupils: Pupils are equal, round, and reactive to light.      Visual Fields: Right eye visual fields normal and left eye visual fields normal.   Cardiovascular:      Rate and Rhythm: Normal rate.   Pulmonary:      Effort: Pulmonary effort is normal. No respiratory distress or retractions.      Breath sounds: Normal air entry.   Abdominal:      General: There is no distension.      Tenderness: There is no abdominal tenderness.   Musculoskeletal:         General: Normal range of motion.      Right lower leg: No edema.      Left lower leg: No edema.   Skin:     General: Skin is warm and dry.      Findings: No lesion.   Neurological:      General: No focal deficit present.      Mental Status: She is alert and oriented to person, place, and time.   Psychiatric:         Mood and Affect: Mood and affect normal.         Behavior: Behavior normal.         Results       Result Review :    The following data was reviewed by: JACKIE Thomas on 07/08/2025:    CBC w/diff          9/9/2024    13:23   CBC w/Diff   WBC 3.12       RBC 3.75       Hemoglobin 11.8       Hematocrit 34.8       MCV 92.8       MCH 31.5       MCHC 33.9       RDW 12.5       Platelets 251       Neutrophil Rel % 53.5       Immature Granulocyte Rel % 0.0       Lymphocyte Rel % 29.8       Monocyte Rel % 14.1       Eosinophil Rel % 1.6       Basophil Rel % 1.0          Details          This result is from an external source.                     Results  Labs   - CBC: 07/07/2025, Hemoglobin 12.6, hematocrit 38.7, platelets 217   - CMP: " 07/07/2025, Creatinine 0.86, alk phos 67, AST 19, ALT 15, total bilirubin 0.6    Diagnostic Testing   - Colonoscopy: 12/09/2024, Perianal and digital rectal exams were normal. Terminal ileum appeared normal. The entire examined colon appeared normal.           Assessment and Plan              Diagnoses and all orders for this visit:    1. Gastroesophageal reflux disease, unspecified whether esophagitis present (Primary)    2. Esophagitis  -     Vonoprazan Fumarate (Voquezna) 20 MG tablet; Take 1 tablet by mouth Daily.  Dispense: 90 tablet; Refill: 1    3. Fecal urgency        Assessment & Plan  1. Gastroesophageal reflux disease (GERD):  - Persistent reflux symptoms despite famotidine at bedtime and pantoprazole in the morning.  - Breakthrough acid reflux occurs daily, primarily at night.  - Discontinue coffee consumption in the morning to see if it alleviates symptoms.  - Prescription for Voquezna once daily sent to pharmacy, to be taken instead of pantoprazole in the morning.  - Continue famotidine at night if needed.  - If discontinuing coffee does not help, inform the office so that colestipol can be considered.    2. Post-cholecystectomy syndrome:  - Experiences urgency to use the bathroom about an hour after eating breakfast, which typically includes oatmeal, scrambled eggs, toast, and coffee.  - Coffee consumption may be related as it is a known stimulant and natural laxative.  - Try skipping coffee in the morning to see if it makes a difference.  - If symptoms persist, colestipol will be considered.    3. Health maintenance:  - Colonoscopy performed on 12/09/2024 showed no polyps.  - Repeat colonoscopy recommended in 10 years.  - Given age, it may not be necessary to repeat as he will be past the normal screening age by then.            Follow Up     Follow Up   Return in about 9 months (around 4/8/2026) for GERD.  Patient was given instructions and counseling regarding her condition or for health  maintenance advice. Please see specific information pulled into the AVS if appropriate.

## 2025-07-27 NOTE — PROGRESS NOTES
Chief Complaint  Diabetes    Referred By: JACKIE Perla    Subjective          Patient or patient representative verbalized consent for the use of Ambient Listening during the visit with  JACKIE Qureshi for chart documentation. 7/28/2025  13:05 EDT    Yohana Casillas presents to [facility] for insulin pump management    History of Present Illness    Visit type:  follow-up  Diabetes type:  Type 1  Current diabetes status/concerns/issues:     History of Present Illness  The patient presents for evaluation of diabetes.    She uses a Tandem insulin pump with NovoLog insulin. The sensor on her pump became loose during a bus trip, which she attempted to secure with a bandage.  She received multiple hypoglycemic alerts when this happened.  No symptoms of hypoglycemia were experienced during this incident. She does not experience excessive thirst, urination, or hunger. Additionally, she reports no blurry vision or unusual fatigue. She maintains a low-carb diet and avoids sugary drinks. During her recent trip, she was physically active, engaging in more walking than usual. Blood sugar levels are monitored, and insulin dosage is adjusted accordingly. Dietary indiscretions during the trip may have contributed to some high blood sugar readings. She does not believe her blood sugar reading of 46 this morning was accurate. No new health issues are reported.       Current Diabetes symptoms:    Polyuria: No   Polydipsia: No   Polyphagia: No   Blurred vision: No   Excessive fatigue: No  Known Diabetes complications:  Neuropathy: None; Location: N/A  Renal: No current urine microalbumin available and Stage II mild (GFR = 60-89 mL/min)  Eyes: Moderate Nonproliferative Retinopathy and Without Macular Edema; Location: Bilateral; Date of Last Exam: 11/25/24  Amputation/Wounds: None  GI: None  Cardiovascular: Hypertension and Hyperlipidemia  ED: N/A  Other: None  Hypoglycemia:  Level 1  hypoglycemia (54 mg/dL - 70 mg/dL); Frequency - 2.5% per CGM and Level 2 (less than 54 mg/dL); Frequency - 3% per CGM; these were false lows due to dislodged sensor  Hypoglycemia Symptoms:  shaking/tremors  Current diabetes treatment:  Tandem insulin pump using NovoLog insulin. She is using approximately 30 units of insulin a day    Blood glucose device:  Dexcom CGM  Blood glucose monitoring frequency:  Continuous per CGM  Blood glucose range/average:  The 14-day sensor report shows an average glucose of 151 mg/dL, with 70% in target range ( mgdL), 15% in the high range (181-250 mg/dL), 8.9% in the very high range (>250 mg/dL), 2.5% in the low range (54-70 mg/dL) and 3% in the very low range (<54 mg/dL).   Glucose Source: Device Reviewed  Diet:  Carbohydrate Counting, Limits high carb/sweet foods, Avoids sugary drinks  Activity/Exercise:  increased activity recently with traveling    Past Medical History:   Diagnosis Date    Anxiety     Cancer     basal cell    Diabetes mellitus type I     GERD (gastroesophageal reflux disease)     History of thyroplasty 07/2021    Hyperlipidemia     Hypertension     Retinopathy     Thyroid disease      Past Surgical History:   Procedure Laterality Date    CARPAL TUNNEL RELEASE      CHOLECYSTECTOMY      COLONOSCOPY      COLONOSCOPY N/A 12/9/2024    Procedure: COLONOSCOPY FOR SCREENING;  Surgeon: Nadia Sanz MD;  Location: [facility] END[facility];  Service: Gastroenterology;  Laterality: N/A;  normal colon    ENDOSCOPY N/A 10/28/2021    Procedure: ESOPHAGOGASTRODUODENOSCOPY with biopsies;  Surgeon: Nadia Sanz MD;  Location: [facility] ENDOSCOPY;  Service: Gastroenterology;  Laterality: N/A;  esophagitis and gastritis    HYSTERECTOMY      THYROID SURGERY      UPPER GASTROINTESTINAL ENDOSCOPY       Family History   Problem Relation Age of Onset    Diabetes type II Other     Cancer Mother     Cancer Father     Cancer Sister     Hypertension Brother     Diabetes  Brother     Malmissael Hyperthermia Neg Hx      Social History     Socioeconomic History    Marital status:     Number of children: 2   Tobacco Use    Smoking status: Never    Smokeless tobacco: Never   Vaping Use    Vaping status: Never Used   Substance and Sexual Activity    Alcohol use: Never    Drug use: Never    Sexual activity: Not Currently     Partners: Male     Birth control/protection: Hysterectomy     Allergies   Allergen Reactions    Macrobid [Nitrofurantoin] GI Intolerance     Constipation, nausea, headache    Sulfa Antibiotics Hives     Other reaction(s): rash    Levofloxacin Rash       Current Outpatient Medications:     aspirin 81 MG EC tablet, Aspirin Low Dose 81 mg oral tablet,delayed release (DR/EC) take 1 tablet (81 mg) by oral route once daily   Active, Disp: , Rfl:     carvedilol (COREG) 6.25 MG tablet, Take 1 tablet by mouth Daily., Disp: , Rfl:     escitalopram (LEXAPRO) 10 MG tablet, TAKE 1 TABLET EVERY DAY, Disp: 90 tablet, Rfl: 1    famotidine (Pepcid) 40 MG tablet, Take 1 tablet by mouth every night at bedtime., Disp: 90 tablet, Rfl: 3    fluticasone (FLONASE) 50 MCG/ACT nasal spray, Administer 2 sprays into the nostril(s) as directed by provider Daily., Disp: , Rfl:     hydroCHLOROthiazide 25 MG tablet, TAKE 1 TABLET EVERY DAY, Disp: 90 tablet, Rfl: 3    levothyroxine (SYNTHROID, LEVOTHROID) 75 MCG tablet, TAKE 1 TABLET BY MOUTH DAILY, Disp: 90 tablet, Rfl: 0    lisinopril (PRINIVIL,ZESTRIL) 40 MG tablet, TAKE 1 TABLET EVERY DAY, Disp: 90 tablet, Rfl: 3    multivitamin (Multi-Vitamin Daily) tablet tablet, , Disp: , Rfl:     NovoLOG 100 UNIT/ML injection, Inject 67 Units under the skin into the appropriate area as directed Daily for 180 days., Disp: 60 mL, Rfl: 1    simvastatin (ZOCOR) 20 MG tablet, Take 1 tablet by mouth Every Night., Disp: , Rfl:     vitamin B-12 (cyanocobalamin) 100 MCG tablet, Take 0.5 tablets by mouth Daily., Disp: , Rfl:     vitamin D3 (Vitamin D) 125 MCG (5000  "UT) capsule capsule, , Disp: , Rfl:     promethazine (PHENERGAN) 25 MG tablet, Take 1 tablet by mouth Every 6 (Six) Hours As Needed for Nausea or Vomiting. (Patient not taking: Reported on 7/28/2025), Disp: 30 tablet, Rfl: 0    Vonoprazan Fumarate (Voquezna) 20 MG tablet, Take 1 tablet by mouth Daily. (Patient not taking: Reported on 7/28/2025), Disp: 90 tablet, Rfl: 1    Objective     Vitals:    07/28/25 1245   BP: 124/65   BP Location: Left arm   Patient Position: Sitting   Cuff Size: Adult   Pulse: (!) 48   SpO2: 97%   Weight: 61.2 kg (135 lb)   Height: 157.5 cm (62.01\")     Body mass index is 24.68 kg/m².    Physical Exam  Constitutional:       Appearance: Normal appearance. She is normal weight.   HENT:      Head: Normocephalic and atraumatic.      Right Ear: External ear normal.      Left Ear: External ear normal.      Nose: Nose normal.   Eyes:      Extraocular Movements: Extraocular movements intact.      Conjunctiva/sclera: Conjunctivae normal.   Pulmonary:      Effort: Pulmonary effort is normal.   Musculoskeletal:         General: Normal range of motion.      Cervical back: Normal range of motion.   Skin:     General: Skin is warm and dry.   Neurological:      General: No focal deficit present.      Mental Status: She is alert and oriented to person, place, and time. Mental status is at baseline.   Psychiatric:         Mood and Affect: Mood normal.         Behavior: Behavior normal.         Thought Content: Thought content normal.         Judgment: Judgment normal.             Result Review :   The following data was reviewed by: JACKIE Qureshi on 07/28/2025:    Most Recent A1C          7/28/2025    13:04   HGBA1C Most Recent   Hemoglobin A1C 6.4        A1C Last 3 Results          1/27/2025    13:04 4/28/2025    13:35 7/28/2025    13:04   HGBA1C Last 3 Results   Hemoglobin A1C 7.2  7.1  6.4      A1c collected in the office today is 6.4%, indicating Controlled Type I diabetes.  This result is " down from the prior result of 7.1% collected on 4/28/25     Glucose   Date Value Ref Range Status   07/28/2025 158 (H) 70 - 99 mg/dL Final     Comment:     Serial Number: [id number]:  [id number]     Point of care glucose in the office today is within normal limits for nonfasting glucose                Diagnoses and all orders for this visit:    1. Controlled type 1 diabetes mellitus with stage 2 chronic kidney disease (Primary)  -     POC Glycosylated Hemoglobin (Hb A1C)  -     POC Glucose    2. 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 Insulin pump in place    4. Uses self-applied continuous glucose monitoring device        Assessment & Plan  1. Diabetes mellitus.  - Her sensor report indicates an average blood glucose level of 151, with 70% of readings within the target range. There were instances of false lows due to a faulty sensor. Her A1c has improved from 7.1% in 04/2025 to 6.4%.  - She continues to experience postprandial hyperglycemia, but adjusting her carbohydrate ratio could potentially lead to hypoglycemia. Her insulin pump is functioning as expected, automatically suspending during hypoglycemic episodes to facilitate rapid recovery. The current plan is to maintain her existing pump settings and monitor for any frequent hypoglycemic episodes. If necessary, adjustments to her sensitivity or carbohydrate ratio will be made. It is unlikely that her basal rate needs modification; rather, it may be her sensitivity that requires adjustment. Her sensitivity is particularly aggressive between 11:00 AM and 3:00 PM, which could be contributing to mid-afternoon hypoglycemia. She is advised to contact via [employer] message or phone call if she experiences frequent lows, so that necessary adjustments can be made remotely.    2. Kidney function.  - A copy of her recent urine test results will be obtained from Ms. Marrero's office.        The  patient will monitor her blood glucose levels per continuous glucose monitor.  If she develops problematic hyperglycemia or hypoglycemia or adverse drug reactions, she will contact the office for further instructions.        Follow Up     Return in about 3 months (around 10/28/2025) for Pump Eval, CGM Follow-up.    Patient was given instructions and counseling regarding her condition or for health maintenance advice. Please see specific information pulled into the AVS if appropriate.     Ame Key, JACKIE  07/28/2025        Dictated Utilizing Dragon Dictation.  Please note that portions of this note were completed with a voice recognition program.  Part of this note may be an electronic transcription/translation of spoken language to printed text using the Dragon Dictation System.

## 2025-07-28 ENCOUNTER — OFFICE VISIT (OUTPATIENT)
Dept: DIABETES SERVICES | Facility: HOSPITAL | Age: 75
End: 2025-07-28
Payer: MEDICARE

## 2025-07-28 VITALS
BODY MASS INDEX: 24.84 KG/M2 | OXYGEN SATURATION: 97 % | HEART RATE: 48 BPM | DIASTOLIC BLOOD PRESSURE: 65 MMHG | WEIGHT: 135 LBS | HEIGHT: 62 IN | SYSTOLIC BLOOD PRESSURE: 124 MMHG

## 2025-07-28 DIAGNOSIS — N18.2 CONTROLLED TYPE 1 DIABETES MELLITUS WITH STAGE 2 CHRONIC KIDNEY DISEASE: Primary | ICD-10-CM

## 2025-07-28 DIAGNOSIS — Z96.41 INSULIN PUMP IN PLACE: ICD-10-CM

## 2025-07-28 DIAGNOSIS — Z97.8 USES SELF-APPLIED CONTINUOUS GLUCOSE MONITORING DEVICE: ICD-10-CM

## 2025-07-28 DIAGNOSIS — E10.3393 TYPE 1 DIABETES MELLITUS WITH MODERATE NONPROLIFERATIVE RETINOPATHY OF BOTH EYES WITHOUT MACULAR EDEMA: ICD-10-CM

## 2025-07-28 DIAGNOSIS — E10.22 CONTROLLED TYPE 1 DIABETES MELLITUS WITH STAGE 2 CHRONIC KIDNEY DISEASE: Primary | ICD-10-CM

## 2025-07-28 LAB
EXPIRATION DATE: ABNORMAL
GLUCOSE BLDC GLUCOMTR-MCNC: 158 MG/DL (ref 70–99)
HBA1C MFR BLD: 6.4 % (ref 4.5–5.7)
Lab: ABNORMAL

## 2025-07-28 PROCEDURE — 83036 HEMOGLOBIN GLYCOSYLATED A1C: CPT | Performed by: NURSE PRACTITIONER

## 2025-07-28 PROCEDURE — 3074F SYST BP LT 130 MM HG: CPT | Performed by: NURSE PRACTITIONER

## 2025-07-28 PROCEDURE — 99214 OFFICE O/P EST MOD 30 MIN: CPT | Performed by: NURSE PRACTITIONER

## 2025-07-28 PROCEDURE — 82948 REAGENT STRIP/BLOOD GLUCOSE: CPT | Performed by: NURSE PRACTITIONER

## 2025-07-28 PROCEDURE — 1160F RVW MEDS BY RX/DR IN RCRD: CPT | Performed by: NURSE PRACTITIONER

## 2025-07-28 PROCEDURE — 3044F HG A1C LEVEL LT 7.0%: CPT | Performed by: NURSE PRACTITIONER

## 2025-07-28 PROCEDURE — 3078F DIAST BP <80 MM HG: CPT | Performed by: NURSE PRACTITIONER

## 2025-07-28 PROCEDURE — G0463 HOSPITAL OUTPT CLINIC VISIT: HCPCS | Performed by: NURSE PRACTITIONER

## 2025-07-28 PROCEDURE — 95251 CONT GLUC MNTR ANALYSIS I&R: CPT | Performed by: NURSE PRACTITIONER

## 2025-07-28 PROCEDURE — G2211 COMPLEX E/M VISIT ADD ON: HCPCS | Performed by: NURSE PRACTITIONER

## 2025-07-28 PROCEDURE — 1159F MED LIST DOCD IN RCRD: CPT | Performed by: NURSE PRACTITIONER

## (undated) DEVICE — SOLIDIFIER LIQLOC PLS 1500CC BT

## (undated) DEVICE — CONN JET HYDRA H20 AUXILIARY DISP

## (undated) DEVICE — EGD OR ERCP KIT: Brand: MEDLINE INDUSTRIES, INC.

## (undated) DEVICE — Device: Brand: DEFENDO AIR/WATER/SUCTION AND BIOPSY VALVE

## (undated) DEVICE — SOL IRRG H2O PL/BG 1000ML STRL

## (undated) DEVICE — INST FRCP BIOPSY RADIALJAW4 W NDL 2.8MM 240CM JUMBO BX40 ORN

## (undated) DEVICE — Device

## (undated) DEVICE — LINER SURG CANSTR SXN S/RIGD 1500CC